# Patient Record
Sex: FEMALE | Race: WHITE | URBAN - METROPOLITAN AREA
[De-identification: names, ages, dates, MRNs, and addresses within clinical notes are randomized per-mention and may not be internally consistent; named-entity substitution may affect disease eponyms.]

---

## 2022-03-02 ENCOUNTER — HOSPITAL ENCOUNTER (OUTPATIENT)
Dept: WOUND CARE | Age: 72
Discharge: HOME | End: 2022-03-02
Payer: COMMERCIAL

## 2022-03-02 VITALS
TEMPERATURE: 96.8 F | BODY MASS INDEX: 39.75 KG/M2 | HEART RATE: 98 BPM | WEIGHT: 216 LBS | HEIGHT: 62 IN | DIASTOLIC BLOOD PRESSURE: 96 MMHG | SYSTOLIC BLOOD PRESSURE: 200 MMHG

## 2022-03-02 DIAGNOSIS — E11.42 DIABETIC POLYNEUROPATHY ASSOCIATED WITH TYPE 2 DIABETES MELLITUS (HCC): ICD-10-CM

## 2022-03-02 DIAGNOSIS — R60.0 LOCALIZED EDEMA: ICD-10-CM

## 2022-03-02 PROCEDURE — 99212 OFFICE O/P EST SF 10 MIN: CPT

## 2022-03-02 RX ORDER — VIT C/B6/B5/MAGNESIUM/HERB 173 50-5-6-5MG
500 CAPSULE ORAL DAILY
COMMUNITY

## 2022-03-02 RX ORDER — ACETAMINOPHEN 500 MG
500 TABLET ORAL EVERY 6 HOURS PRN
COMMUNITY

## 2022-03-02 NOTE — PROGRESS NOTES
[facility]   Progress Note and Procedure Note      Siddharth Estrada  AGE: 66 y.o. GENDER: female  : 1950  TODAY'S DATE:  3/2/2022    Subjective:     Chief Complaint   Patient presents with    Wound Check     right leg 1st visit         HISTORY of PRESENT ILLNESS HPI     Siddharth Estrada is a 66 y.o. female who presents today for wound evaluation. History of Wound: Admits to having a lump on her leg then a wound with cellulitis. She has been treated with antibiotics and was sent to the wound care center. She states that the wound is healed and is been looking better progressively but she was still concerned about what happened with her leg. She does not remember specific injury. She denies current nausea, vomiting, fever, chills, shortness of breath or chest pain. Wound Pain:  none  Severity:  0 / 10   Wound Type:  diabetic, traumatic and Hematoma  Modifying Factors:  edema  Associated Signs/Symptoms:  edema        PAST MEDICAL HISTORY        Diagnosis Date    Diabetes mellitus ([address]     Hyperlipidemia     Hypertension        PAST SURGICAL HISTORY    Past Surgical History:   Procedure Laterality Date    CARPAL TUNNEL RELEASE      HERNIA REPAIR      ROTATOR CUFF REPAIR      right shoulder    TOTAL KNEE ARTHROPLASTY      right and left        FAMILY HISTORY    History reviewed. No pertinent family history.     SOCIAL HISTORY    Social History     Tobacco Use    Smoking status: Former Smoker     Types: Cigarettes    Smokeless tobacco: Never Used    Tobacco comment: over 48 years ago   Vaping Use    Vaping Use: Never used   Substance Use Topics    Alcohol use: Yes     Comment: 2 times a month    Drug use: Never       ALLERGIES    Not on File    MEDICATIONS    Current Outpatient Medications on File Prior to Encounter   Medication Sig Dispense Refill    turmeric (QC TUMERIC COMPLEX) 500 MG CAPS Take 500 mg by mouth daily      Folic Acid-Vit H2-RLT W00 0.5-5-0.2 MG TABS Take by mouth 2 times daily      acetaminophen (TYLENOL) 500 MG tablet Take 500 mg by mouth every 6 hours as needed for Pain Indications: time released       No current facility-administered medications on file prior to encounter. REVIEW OF SYSTEMS    Pertinent items are noted in HPI. Objective:      BP (!) 200/96   Pulse 98   Temp 96.8 °F (36 °C) (Tympanic)   Ht 5' 2\" (1.575 m)   Wt 216 lb (98 kg)   BMI 39.51 kg/m²     PHYSICAL EXAM    Vascular: Vascular status Impaired  palpable pedal pulses, right DP2/4 and PT1/4, left DP2/4 and PT1/4. CFT 2 seconds digits 1 to 5 bilateral.  Hair growthAbsent  both lower extremities and feet. Skin temperature is warm to warm from pretibial area to distal digits bilateral.  Exam is negative for rubor, pallor, cyanosis or signs of acute vascular compromise bilaterally. Exam is negative for edema bilateral lower extremity. Varicosities Present bilateral lower extremity. Neuro: Neurologic status diminished bilateral with epicritic Present , proprioceptive Present, vibratory sensationPresent and protopathicPresent. DTRs Present bilateral Achilles. There were no reproducible neuritic symptoms on exam bilateral feet/ankles. Derm: Mild chronic venous stasis changes. Ecchymosis Absent  bilateral feet/foot. Palpable subcutaneous mass approximately 3 cm x 2 cm with a thickness of 1 cm consistent with hematoma. Musculoskeletal: Pain with palpation of subcutaneous mass. 5/5 muscle strength in/eversion and dorsi/plantarflexion bilateral feet. No gross instability noted.         Assessment:     Problem List Items Addressed This Visit     Localized edema    Diabetic polyneuropathy ([facility] [address]            Plan:   Patient examined and evaluated   Wound currently healed  Discussed etiology of the patient's hematoma, wound and cellulitis and answered all questions the patient satisfaction  Discharge from wound care center  Discussed the nature of the patient's condition was explained in depth.  The patient was informed that their compliance to the treatment plan is paramount to successful healing and prevention of further ulceration and/or infection     Discharge Treatment  Follow-up as needed    Written Patient Discharge Instructions Given            Electronically signed by Reshma Schmid DPM on 3/2/2022 at 2:20 PM

## 2023-11-27 ENCOUNTER — TELEPHONE (OUTPATIENT)
Dept: CARDIAC REHAB | Age: 73
End: 2023-11-27

## 2023-11-27 NOTE — TELEPHONE ENCOUNTER
Called pt. To confirm cardiac rehab evaluation 9am. Told about $10 copay/ visit till Max oop by 2nd. Ins. [employer]. Pt. Will call [employer] And check coverage beyond medicare a/b. \"This is new for me. \" Told about 12 visit minimum. Verbalized understanding. Coming.

## 2023-11-28 ENCOUNTER — HOSPITAL ENCOUNTER (OUTPATIENT)
Dept: CARDIAC REHAB | Age: 73
Setting detail: THERAPIES SERIES
Discharge: HOME | End: 2023-11-28
Payer: MEDICARE

## 2023-11-28 VITALS
DIASTOLIC BLOOD PRESSURE: 80 MMHG | SYSTOLIC BLOOD PRESSURE: 142 MMHG | HEART RATE: 72 BPM | WEIGHT: 204.4 LBS | BODY MASS INDEX: 37.61 KG/M2 | HEIGHT: 62 IN | RESPIRATION RATE: 16 BRPM

## 2023-11-28 PROCEDURE — 93798 PHYS/QHP OP CAR RHAB W/ECG: CPT

## 2023-11-28 RX ORDER — AMOXICILLIN 500 MG/1
500 CAPSULE ORAL SEE ADMIN INSTRUCTIONS
COMMUNITY
Start: 2023-09-08

## 2023-11-28 RX ORDER — CLOPIDOGREL BISULFATE 75 MG/1
75 TABLET ORAL DAILY
COMMUNITY
Start: 2023-06-19

## 2023-11-28 RX ORDER — CARVEDILOL 12.5 MG/1
12.5 TABLET ORAL 2 TIMES DAILY WITH MEALS
COMMUNITY
Start: 2023-09-05

## 2023-11-28 RX ORDER — GLUCOSAMINE SULFATE 500 MG
1500 CAPSULE ORAL SEE ADMIN INSTRUCTIONS
COMMUNITY

## 2023-11-28 RX ORDER — ASPIRIN 81 MG/1
81 TABLET ORAL DAILY
COMMUNITY

## 2023-11-28 RX ORDER — CALCIUM CARBONATE-CHOLECALCIFEROL TAB 250 MG-125 UNIT 250-125 MG-UNIT
1 TAB ORAL DAILY
COMMUNITY

## 2023-11-28 RX ORDER — SPIRONOLACTONE 25 MG/1
12.5 TABLET ORAL DAILY
COMMUNITY

## 2023-11-28 RX ORDER — POLYMYXIN B SULFATE AND TRIMETHOPRIM 1; 10000 MG/ML; [USP'U]/ML
1 SOLUTION OPHTHALMIC 4 TIMES DAILY
COMMUNITY
Start: 2023-10-04

## 2023-11-28 RX ORDER — METFORMIN HYDROCHLORIDE 500 MG/1
500 TABLET, EXTENDED RELEASE ORAL SEE ADMIN INSTRUCTIONS
COMMUNITY
Start: 2023-11-27

## 2023-11-28 RX ORDER — ATORVASTATIN CALCIUM 80 MG/1
80 TABLET, FILM COATED ORAL NIGHTLY
COMMUNITY
Start: 2023-10-16

## 2023-11-28 RX ORDER — FUROSEMIDE 40 MG/1
40 TABLET ORAL DAILY PRN
COMMUNITY
Start: 2023-05-23

## 2023-11-28 RX ORDER — ASCORBIC ACID 500 MG
TABLET ORAL DAILY
COMMUNITY

## 2023-11-28 ASSESSMENT — PATIENT HEALTH QUESTIONNAIRE - PHQ9
7. TROUBLE CONCENTRATING ON THINGS, SUCH AS READING THE NEWSPAPER OR WATCHING TELEVISION: 0
SUM OF ALL RESPONSES TO PHQ9 QUESTIONS 1 & 2: 0
1. LITTLE INTEREST OR PLEASURE IN DOING THINGS: 0
SUM OF ALL RESPONSES TO PHQ QUESTIONS 1-9: 1
6. FEELING BAD ABOUT YOURSELF - OR THAT YOU ARE A FAILURE OR HAVE LET YOURSELF OR YOUR FAMILY DOWN: 0
9. THOUGHTS THAT YOU WOULD BE BETTER OFF DEAD, OR OF HURTING YOURSELF: 0
4. FEELING TIRED OR HAVING LITTLE ENERGY: 1
SUM OF ALL RESPONSES TO PHQ QUESTIONS 1-9: 1
8. MOVING OR SPEAKING SO SLOWLY THAT OTHER PEOPLE COULD HAVE NOTICED. OR THE OPPOSITE, BEING SO FIGETY OR RESTLESS THAT YOU HAVE BEEN MOVING AROUND A LOT MORE THAN USUAL: 0
2. FEELING DOWN, DEPRESSED OR HOPELESS: 0
3. TROUBLE FALLING OR STAYING ASLEEP: 0
5. POOR APPETITE OR OVEREATING: 0
10. IF YOU CHECKED OFF ANY PROBLEMS, HOW DIFFICULT HAVE THESE PROBLEMS MADE IT FOR YOU TO DO YOUR WORK, TAKE CARE OF THINGS AT HOME, OR GET ALONG WITH OTHER PEOPLE: 0
SUM OF ALL RESPONSES TO PHQ QUESTIONS 1-9: 1
SUM OF ALL RESPONSES TO PHQ QUESTIONS 1-9: 1

## 2023-11-28 NOTE — CARDIO/PULMONARY
[facility] Cardiac Rehabilitation Initial Evaluation    Cosme       1950     [id number]    Share medical information:  Yes - Becky Guerrier ();  [phone]    Cardiac History    PTCA Stent  EF:  50-55%    Physical Assessment     General Appearance   Height:  157.5 cm (5' 2\")  Weight:  92.7 kg (204 lb 6.4 oz) (204.4 lb)   BMI:  37.5  Skin color:  Skin is warm, dry and appropriate for ethnicity    Cardiovascular Assessment  BP Sitting:  (!) 142/80 (left arm)  Sittin/80 (right arm)  Standin/80 (right arm)  Heart rate:   72 Monitor   Heart sounds: Exceptions to WDL   S1, S2, No adventitious heart sounds    Respiratory Assessment  Resp rate: 16   SpO2:   Quality/Effort:  Respirations are regular and easy. Lungs are clear bilaterally. No increased work of breathing noted. Sleep Apnea:  No  CPAP  No  Oxygen  No     Sleeping Habits:  No Issues with sleep per pt. Edema:  No      Orthopedic/Exercise Limitations:  Yes - Pt. States that she has spine issues and neuropathy of feet. Pain:   Do you have pain?:  yes - back and feet       Fall Risk Assessment  Outpatient population: Not applicable  History of falling with or without injury: No  Use of ambulatory aid: Yes  Difficulty walking/impaired gait: No  Numbness in feet: Yes  Vision changes: No  Dizziness: No  Shortness of breath: No  Current medications include but not limited to: ACE, ARB, Beta  Blocker, Anticoagulant  Other fall risk : No  Outpatient fall risk intervention strategies: Fall risk education provided    Abuse / Neglect  Physical/behavioral signs of abuse/neglect   No    Do you feel safe at home   Yes    Advanced Directives  Patient has Advanced Directives:  Yes  Patient given Advanced Directive pack:  Declined    Vaccinations  Influenza (annual):  Yes  Pneumonia:  Yes    Pt. Arrived to Cardiopulmonary rehab for the initial interview and evaluation for Cardiac rehab.   Reviewed and discussed insurance

## 2023-11-29 ENCOUNTER — HOSPITAL ENCOUNTER (OUTPATIENT)
Dept: CARDIAC REHAB | Age: 73
Setting detail: THERAPIES SERIES
Discharge: HOME | End: 2023-11-29
Payer: MEDICARE

## 2023-11-29 PROCEDURE — 93798 PHYS/QHP OP CAR RHAB W/ECG: CPT

## 2023-12-01 ENCOUNTER — HOSPITAL ENCOUNTER (OUTPATIENT)
Dept: CARDIAC REHAB | Age: 73
Setting detail: THERAPIES SERIES
Discharge: HOME | End: 2023-12-01
Payer: MEDICARE

## 2023-12-01 PROCEDURE — 93798 PHYS/QHP OP CAR RHAB W/ECG: CPT

## 2023-12-04 ENCOUNTER — APPOINTMENT (OUTPATIENT)
Dept: CARDIAC REHAB | Age: 73
End: 2023-12-04
Payer: MEDICARE

## 2023-12-06 ENCOUNTER — HOSPITAL ENCOUNTER (OUTPATIENT)
Dept: CARDIAC REHAB | Age: 73
Setting detail: THERAPIES SERIES
Discharge: HOME | End: 2023-12-06
Payer: MEDICARE

## 2023-12-06 PROCEDURE — 93798 PHYS/QHP OP CAR RHAB W/ECG: CPT

## 2023-12-08 ENCOUNTER — HOSPITAL ENCOUNTER (OUTPATIENT)
Dept: CARDIAC REHAB | Age: 73
Setting detail: THERAPIES SERIES
Discharge: HOME | End: 2023-12-08
Payer: MEDICARE

## 2023-12-08 LAB
GLUCOSE BLD-MCNC: 81 MG/DL (ref 70–99)
GLUCOSE BLD-MCNC: 88 MG/DL (ref 70–99)
PERFORMED ON: NORMAL
PERFORMED ON: NORMAL

## 2023-12-08 PROCEDURE — 93798 PHYS/QHP OP CAR RHAB W/ECG: CPT

## 2023-12-10 LAB
GLUCOSE BLD-MCNC: 108 MG/DL (ref 70–99)
GLUCOSE BLD-MCNC: 80 MG/DL (ref 70–99)
GLUCOSE BLD-MCNC: 88 MG/DL (ref 70–99)
PERFORMED ON: ABNORMAL
PERFORMED ON: NORMAL
PERFORMED ON: NORMAL

## 2023-12-11 ENCOUNTER — HOSPITAL ENCOUNTER (OUTPATIENT)
Dept: CARDIAC REHAB | Age: 73
Setting detail: THERAPIES SERIES
Discharge: HOME | End: 2023-12-11
Payer: MEDICARE

## 2023-12-11 PROCEDURE — 93798 PHYS/QHP OP CAR RHAB W/ECG: CPT

## 2023-12-13 ENCOUNTER — HOSPITAL ENCOUNTER (OUTPATIENT)
Dept: CARDIAC REHAB | Age: 73
Setting detail: THERAPIES SERIES
Discharge: HOME | End: 2023-12-13
Payer: MEDICARE

## 2023-12-13 PROCEDURE — 93798 PHYS/QHP OP CAR RHAB W/ECG: CPT

## 2023-12-14 ENCOUNTER — HOSPITAL ENCOUNTER (OUTPATIENT)
Dept: CARDIAC REHAB | Age: 73
Setting detail: THERAPIES SERIES
Discharge: HOME | End: 2023-12-14
Payer: MEDICARE

## 2023-12-14 LAB
GLUCOSE BLD-MCNC: 102 MG/DL (ref 70–99)
GLUCOSE BLD-MCNC: 109 MG/DL (ref 70–99)
PERFORMED ON: ABNORMAL
PERFORMED ON: ABNORMAL

## 2023-12-14 PROCEDURE — 93798 PHYS/QHP OP CAR RHAB W/ECG: CPT

## 2023-12-20 ENCOUNTER — APPOINTMENT (OUTPATIENT)
Dept: CARDIAC REHAB | Age: 73
End: 2023-12-20
Payer: MEDICARE

## 2023-12-27 ENCOUNTER — HOSPITAL ENCOUNTER (OUTPATIENT)
Dept: CARDIAC REHAB | Age: 73
Setting detail: THERAPIES SERIES
Discharge: HOME | End: 2023-12-27
Payer: MEDICARE

## 2023-12-27 PROCEDURE — 93798 PHYS/QHP OP CAR RHAB W/ECG: CPT

## 2023-12-29 ENCOUNTER — HOSPITAL ENCOUNTER (OUTPATIENT)
Dept: CARDIAC REHAB | Age: 73
Setting detail: THERAPIES SERIES
Discharge: HOME | End: 2023-12-29
Payer: MEDICARE

## 2023-12-29 PROCEDURE — 93798 PHYS/QHP OP CAR RHAB W/ECG: CPT

## 2024-02-12 ENCOUNTER — HOSPITAL ENCOUNTER (INPATIENT)
Age: 74
LOS: 4 days | Discharge: NURSING, REHAB OR LONG-TERM CARE | End: 2024-02-16
Attending: EMERGENCY MEDICINE | Admitting: INTERNAL MEDICINE
Payer: MEDICARE

## 2024-02-12 ENCOUNTER — APPOINTMENT (OUTPATIENT)
Dept: MRI IMAGING | Age: 74
End: 2024-02-12
Payer: MEDICARE

## 2024-02-12 ENCOUNTER — APPOINTMENT (OUTPATIENT)
Dept: CT IMAGING | Age: 74
End: 2024-02-12
Payer: MEDICARE

## 2024-02-12 ENCOUNTER — APPOINTMENT (OUTPATIENT)
Dept: GENERAL RADIOLOGY | Age: 74
End: 2024-02-12
Payer: MEDICARE

## 2024-02-12 DIAGNOSIS — S82.62XA CLOSED DISPLACED FRACTURE OF LATERAL MALLEOLUS OF LEFT FIBULA, INITIAL ENCOUNTER: ICD-10-CM

## 2024-02-12 DIAGNOSIS — R79.89 ELEVATED TROPONIN: ICD-10-CM

## 2024-02-12 DIAGNOSIS — R53.1 ACUTE LEFT-SIDED WEAKNESS: Primary | ICD-10-CM

## 2024-02-12 PROBLEM — E11.9 DM2 (DIABETES MELLITUS, TYPE 2) (HCC): Status: ACTIVE | Noted: 2024-02-12

## 2024-02-12 PROBLEM — I10 HTN (HYPERTENSION): Status: ACTIVE | Noted: 2024-02-12

## 2024-02-12 PROBLEM — I63.9 ACUTE CVA (CEREBROVASCULAR ACCIDENT) (HCC): Status: ACTIVE | Noted: 2024-02-12

## 2024-02-12 PROBLEM — G81.94 LEFT HEMIPLEGIA (HCC): Status: ACTIVE | Noted: 2024-02-12

## 2024-02-12 PROBLEM — E66.9 OBESITY (BMI 30-39.9): Status: ACTIVE | Noted: 2024-02-12

## 2024-02-12 PROBLEM — I25.10 CAD (CORONARY ARTERY DISEASE): Status: ACTIVE | Noted: 2024-02-12

## 2024-02-12 PROBLEM — R13.10 DYSPHAGIA: Status: ACTIVE | Noted: 2024-02-12

## 2024-02-12 LAB
ANION GAP SERPL CALCULATED.3IONS-SCNC: 16 MMOL/L (ref 3–16)
ANISOCYTOSIS BLD QL SMEAR: ABNORMAL
BASOPHILS # BLD: 0 K/UL (ref 0–0.2)
BASOPHILS NFR BLD: 0 %
BUN SERPL-MCNC: 21 MG/DL (ref 7–20)
CALCIUM SERPL-MCNC: 9.3 MG/DL (ref 8.3–10.6)
CHLORIDE SERPL-SCNC: 99 MMOL/L (ref 99–110)
CO2 SERPL-SCNC: 22 MMOL/L (ref 21–32)
CREAT SERPL-MCNC: 0.8 MG/DL (ref 0.6–1.2)
CRP SERPL-MCNC: 69.1 MG/L (ref 0–5.1)
DEPRECATED RDW RBC AUTO: 13.8 % (ref 12.4–15.4)
EKG ATRIAL RATE: 78 BPM
EKG DIAGNOSIS: NORMAL
EKG P AXIS: 71 DEGREES
EKG P-R INTERVAL: 152 MS
EKG Q-T INTERVAL: 444 MS
EKG QRS DURATION: 150 MS
EKG QTC CALCULATION (BAZETT): 506 MS
EKG R AXIS: -57 DEGREES
EKG T AXIS: 116 DEGREES
EKG VENTRICULAR RATE: 78 BPM
EOSINOPHIL # BLD: 0 K/UL (ref 0–0.6)
EOSINOPHIL NFR BLD: 0 %
ERYTHROCYTE [SEDIMENTATION RATE] IN BLOOD BY WESTERGREN METHOD: 99 MM/HR (ref 0–30)
FLUAV RNA RESP QL NAA+PROBE: NOT DETECTED
FLUBV RNA RESP QL NAA+PROBE: NOT DETECTED
GFR SERPLBLD CREATININE-BSD FMLA CKD-EPI: >60 ML/MIN/{1.73_M2}
GLUCOSE BLD-MCNC: 190 MG/DL (ref 70–99)
GLUCOSE SERPL-MCNC: 186 MG/DL (ref 70–99)
HCT VFR BLD AUTO: 35.3 % (ref 36–48)
HGB BLD-MCNC: 11.6 G/DL (ref 12–16)
LYMPHOCYTES # BLD: 1.6 K/UL (ref 1–5.1)
LYMPHOCYTES NFR BLD: 12 %
MCH RBC QN AUTO: 30.5 PG (ref 26–34)
MCHC RBC AUTO-ENTMCNC: 33 G/DL (ref 31–36)
MCV RBC AUTO: 92.3 FL (ref 80–100)
MONOCYTES # BLD: 0.7 K/UL (ref 0–1.3)
MONOCYTES NFR BLD: 6 %
NEUTROPHILS # BLD: 9.8 K/UL (ref 1.7–7.7)
NEUTROPHILS NFR BLD: 81 %
NT-PROBNP SERPL-MCNC: 811 PG/ML (ref 0–449)
PERFORMED ON: ABNORMAL
PLATELET # BLD AUTO: 240 K/UL (ref 135–450)
PLATELET BLD QL SMEAR: ADEQUATE
PMV BLD AUTO: 7.1 FL (ref 5–10.5)
POTASSIUM SERPL-SCNC: 4.3 MMOL/L (ref 3.5–5.1)
PROCALCITONIN SERPL IA-MCNC: 0.14 NG/ML (ref 0–0.15)
RBC # BLD AUTO: 3.82 M/UL (ref 4–5.2)
SARS-COV-2 RNA RESP QL NAA+PROBE: NOT DETECTED
SLIDE REVIEW: ABNORMAL
SODIUM SERPL-SCNC: 137 MMOL/L (ref 136–145)
TROPONIN, HIGH SENSITIVITY: 33 NG/L (ref 0–14)
VARIANT LYMPHS NFR BLD MANUAL: 1 % (ref 0–6)
WBC # BLD AUTO: 12.1 K/UL (ref 4–11)

## 2024-02-12 PROCEDURE — 70551 MRI BRAIN STEM W/O DYE: CPT

## 2024-02-12 PROCEDURE — 6370000000 HC RX 637 (ALT 250 FOR IP): Performed by: INTERNAL MEDICINE

## 2024-02-12 PROCEDURE — 6360000002 HC RX W HCPCS: Performed by: INTERNAL MEDICINE

## 2024-02-12 PROCEDURE — 6360000004 HC RX CONTRAST MEDICATION: Performed by: INTERNAL MEDICINE

## 2024-02-12 PROCEDURE — 2060000000 HC ICU INTERMEDIATE R&B

## 2024-02-12 PROCEDURE — 73610 X-RAY EXAM OF ANKLE: CPT

## 2024-02-12 PROCEDURE — 84145 PROCALCITONIN (PCT): CPT

## 2024-02-12 PROCEDURE — 70450 CT HEAD/BRAIN W/O DYE: CPT

## 2024-02-12 PROCEDURE — 2580000003 HC RX 258: Performed by: INTERNAL MEDICINE

## 2024-02-12 PROCEDURE — 93005 ELECTROCARDIOGRAM TRACING: CPT | Performed by: INTERNAL MEDICINE

## 2024-02-12 PROCEDURE — 87636 SARSCOV2 & INF A&B AMP PRB: CPT

## 2024-02-12 PROCEDURE — 96365 THER/PROPH/DIAG IV INF INIT: CPT

## 2024-02-12 PROCEDURE — 93010 ELECTROCARDIOGRAM REPORT: CPT | Performed by: INTERNAL MEDICINE

## 2024-02-12 PROCEDURE — 70498 CT ANGIOGRAPHY NECK: CPT

## 2024-02-12 PROCEDURE — 71045 X-RAY EXAM CHEST 1 VIEW: CPT

## 2024-02-12 PROCEDURE — 86140 C-REACTIVE PROTEIN: CPT

## 2024-02-12 PROCEDURE — 36415 COLL VENOUS BLD VENIPUNCTURE: CPT

## 2024-02-12 PROCEDURE — 85652 RBC SED RATE AUTOMATED: CPT

## 2024-02-12 PROCEDURE — 99285 EMERGENCY DEPT VISIT HI MDM: CPT

## 2024-02-12 PROCEDURE — 84484 ASSAY OF TROPONIN QUANT: CPT

## 2024-02-12 PROCEDURE — 85025 COMPLETE CBC W/AUTO DIFF WBC: CPT

## 2024-02-12 PROCEDURE — 80048 BASIC METABOLIC PNL TOTAL CA: CPT

## 2024-02-12 PROCEDURE — 96375 TX/PRO/DX INJ NEW DRUG ADDON: CPT

## 2024-02-12 PROCEDURE — 83880 ASSAY OF NATRIURETIC PEPTIDE: CPT

## 2024-02-12 RX ORDER — FENTANYL CITRATE 50 UG/ML
25 INJECTION, SOLUTION INTRAMUSCULAR; INTRAVENOUS ONCE
Status: COMPLETED | OUTPATIENT
Start: 2024-02-12 | End: 2024-02-12

## 2024-02-12 RX ORDER — CLOPIDOGREL BISULFATE 75 MG/1
75 TABLET ORAL DAILY
Status: DISCONTINUED | OUTPATIENT
Start: 2024-02-13 | End: 2024-02-16 | Stop reason: HOSPADM

## 2024-02-12 RX ORDER — POTASSIUM CHLORIDE 7.45 MG/ML
10 INJECTION INTRAVENOUS PRN
Status: DISCONTINUED | OUTPATIENT
Start: 2024-02-12 | End: 2024-02-16 | Stop reason: HOSPADM

## 2024-02-12 RX ORDER — ACETAMINOPHEN 650 MG/1
650 SUPPOSITORY RECTAL EVERY 6 HOURS PRN
Status: DISCONTINUED | OUTPATIENT
Start: 2024-02-12 | End: 2024-02-16 | Stop reason: HOSPADM

## 2024-02-12 RX ORDER — VITAMIN B COMPLEX
1000 TABLET ORAL DAILY
Status: DISCONTINUED | OUTPATIENT
Start: 2024-02-13 | End: 2024-02-16 | Stop reason: HOSPADM

## 2024-02-12 RX ORDER — ONDANSETRON 2 MG/ML
4 INJECTION INTRAMUSCULAR; INTRAVENOUS EVERY 6 HOURS PRN
Status: DISCONTINUED | OUTPATIENT
Start: 2024-02-12 | End: 2024-02-16 | Stop reason: HOSPADM

## 2024-02-12 RX ORDER — POTASSIUM CHLORIDE 20 MEQ/1
40 TABLET, EXTENDED RELEASE ORAL PRN
Status: DISCONTINUED | OUTPATIENT
Start: 2024-02-12 | End: 2024-02-16 | Stop reason: HOSPADM

## 2024-02-12 RX ORDER — POLYETHYLENE GLYCOL 3350 17 G/17G
17 POWDER, FOR SOLUTION ORAL DAILY PRN
Status: DISCONTINUED | OUTPATIENT
Start: 2024-02-12 | End: 2024-02-16 | Stop reason: HOSPADM

## 2024-02-12 RX ORDER — SODIUM CHLORIDE 0.9 % (FLUSH) 0.9 %
5-40 SYRINGE (ML) INJECTION EVERY 12 HOURS SCHEDULED
Status: DISCONTINUED | OUTPATIENT
Start: 2024-02-12 | End: 2024-02-16 | Stop reason: HOSPADM

## 2024-02-12 RX ORDER — ONDANSETRON 4 MG/1
4 TABLET, ORALLY DISINTEGRATING ORAL EVERY 8 HOURS PRN
Status: DISCONTINUED | OUTPATIENT
Start: 2024-02-12 | End: 2024-02-12

## 2024-02-12 RX ORDER — ONDANSETRON 4 MG/1
4 TABLET, ORALLY DISINTEGRATING ORAL EVERY 8 HOURS PRN
Status: DISCONTINUED | OUTPATIENT
Start: 2024-02-12 | End: 2024-02-16 | Stop reason: HOSPADM

## 2024-02-12 RX ORDER — SODIUM CHLORIDE 0.9 % (FLUSH) 0.9 %
5-40 SYRINGE (ML) INJECTION PRN
Status: DISCONTINUED | OUTPATIENT
Start: 2024-02-12 | End: 2024-02-16 | Stop reason: HOSPADM

## 2024-02-12 RX ORDER — MAGNESIUM SULFATE IN WATER 40 MG/ML
2000 INJECTION, SOLUTION INTRAVENOUS PRN
Status: DISCONTINUED | OUTPATIENT
Start: 2024-02-12 | End: 2024-02-16 | Stop reason: HOSPADM

## 2024-02-12 RX ORDER — SODIUM CHLORIDE 9 MG/ML
INJECTION, SOLUTION INTRAVENOUS CONTINUOUS
Status: ACTIVE | OUTPATIENT
Start: 2024-02-12 | End: 2024-02-14

## 2024-02-12 RX ORDER — ENOXAPARIN SODIUM 100 MG/ML
40 INJECTION SUBCUTANEOUS DAILY
Status: DISCONTINUED | OUTPATIENT
Start: 2024-02-12 | End: 2024-02-16 | Stop reason: HOSPADM

## 2024-02-12 RX ORDER — CETIRIZINE HYDROCHLORIDE 10 MG/1
10 TABLET ORAL DAILY
Status: DISCONTINUED | OUTPATIENT
Start: 2024-02-13 | End: 2024-02-16 | Stop reason: HOSPADM

## 2024-02-12 RX ORDER — ATORVASTATIN CALCIUM 80 MG/1
80 TABLET, FILM COATED ORAL NIGHTLY
Status: DISCONTINUED | OUTPATIENT
Start: 2024-02-12 | End: 2024-02-16 | Stop reason: HOSPADM

## 2024-02-12 RX ORDER — ASPIRIN 81 MG/1
81 TABLET ORAL DAILY
Status: DISCONTINUED | OUTPATIENT
Start: 2024-02-13 | End: 2024-02-16 | Stop reason: HOSPADM

## 2024-02-12 RX ORDER — CARVEDILOL 6.25 MG/1
12.5 TABLET ORAL 2 TIMES DAILY WITH MEALS
Status: DISCONTINUED | OUTPATIENT
Start: 2024-02-12 | End: 2024-02-16 | Stop reason: HOSPADM

## 2024-02-12 RX ORDER — ACETAMINOPHEN 325 MG/1
650 TABLET ORAL EVERY 6 HOURS PRN
Status: DISCONTINUED | OUTPATIENT
Start: 2024-02-12 | End: 2024-02-16 | Stop reason: HOSPADM

## 2024-02-12 RX ORDER — SODIUM CHLORIDE 9 MG/ML
INJECTION, SOLUTION INTRAVENOUS PRN
Status: DISCONTINUED | OUTPATIENT
Start: 2024-02-12 | End: 2024-02-16 | Stop reason: HOSPADM

## 2024-02-12 RX ORDER — HYDRALAZINE HYDROCHLORIDE 20 MG/ML
10 INJECTION INTRAMUSCULAR; INTRAVENOUS EVERY 4 HOURS PRN
Status: DISCONTINUED | OUTPATIENT
Start: 2024-02-12 | End: 2024-02-16 | Stop reason: HOSPADM

## 2024-02-12 RX ORDER — ONDANSETRON 2 MG/ML
4 INJECTION INTRAMUSCULAR; INTRAVENOUS EVERY 6 HOURS PRN
Status: DISCONTINUED | OUTPATIENT
Start: 2024-02-12 | End: 2024-02-12

## 2024-02-12 RX ADMIN — SACUBITRIL AND VALSARTAN 1 TABLET: 24; 26 TABLET, FILM COATED ORAL at 22:20

## 2024-02-12 RX ADMIN — FENTANYL CITRATE 25 MCG: 50 INJECTION INTRAMUSCULAR; INTRAVENOUS at 14:34

## 2024-02-12 RX ADMIN — Medication 10 ML: at 22:27

## 2024-02-12 RX ADMIN — CARVEDILOL 12.5 MG: 6.25 TABLET, FILM COATED ORAL at 22:30

## 2024-02-12 RX ADMIN — PIPERACILLIN AND TAZOBACTAM 4500 MG: 4; .5 INJECTION, POWDER, FOR SOLUTION INTRAVENOUS at 15:37

## 2024-02-12 RX ADMIN — IOPAMIDOL 75 ML: 755 INJECTION, SOLUTION INTRAVENOUS at 12:36

## 2024-02-12 RX ADMIN — ENOXAPARIN SODIUM 40 MG: 100 INJECTION SUBCUTANEOUS at 22:30

## 2024-02-12 RX ADMIN — ATORVASTATIN CALCIUM 80 MG: 80 TABLET, FILM COATED ORAL at 22:20

## 2024-02-12 RX ADMIN — SODIUM CHLORIDE: 9 INJECTION, SOLUTION INTRAVENOUS at 22:27

## 2024-02-12 ASSESSMENT — PAIN SCALES - GENERAL
PAINLEVEL_OUTOF10: 0
PAINLEVEL_OUTOF10: 7
PAINLEVEL_OUTOF10: 9

## 2024-02-12 ASSESSMENT — PAIN - FUNCTIONAL ASSESSMENT: PAIN_FUNCTIONAL_ASSESSMENT: 0-10

## 2024-02-12 ASSESSMENT — PAIN DESCRIPTION - LOCATION: LOCATION: HEAD

## 2024-02-12 ASSESSMENT — PAIN DESCRIPTION - DESCRIPTORS: DESCRIPTORS: ACHING

## 2024-02-12 NOTE — ED PROVIDER NOTES
EMERGENCY MEDICINE PROVIDER NOTE    Patient Identification  Pt Name: Rissa Becerra  MRN: [id number]  Birthdate 1950  Date of evaluation: 2/12/2024  Provider: WILLIAM HAIDER DO  PCP: Stephanie Oneill MD    Chief Complaint  Cerebrovascular Accident (Pt brought in per [facility] EMS from home, pt was taken by family to [facility] on Friday for slurred speech, dx with a CVA, then seen by a telehealth neurologist and discharged on Saturday.  Per family pts symptoms have progressed daily since discharge.  Pt has left sided facial droop, left arm flaccid.  Stroke alert called in the field)      HPI  (History provided by EMS)  This is a 73 y.o. female who was brought in by EMS transportation for left leg and left arm weakness along with left-sided facial droop.  Patient was seen on Friday at [facility] for a stroke.  Family arrived and informed me that she had intermittent left-sided facial droop along with slurred speech that started at 3 PM Friday.  She was last seen normal at 10 AM Friday.  Patient was admitted overnight at [facility] and was found to have multifocal strokes on the CT of the MRI.  When she was discharged on Saturday the weakness in her left arm and left leg along with a left-sided face worsened.  Family called today because she fell twice due to weakness.  Patient does have left ankle weakness as well.  Stroke alert was called prior to arrival and admit the patient in the hallway.  Patient went right to CT for a scan    I have reviewed the following nursing documentation:  Allergies: Amoxicillin, Erythromycin, and Gabapentin    Past medical history:   Past Medical History:   Diagnosis Date    Arthropathy     Asthma     Bell's palsy     on right side    Bronchitis     Diabetes mellitus (HCC)     Diverticulitis     Hyperlipidemia     Hypertension     Migraine     Polyneuropathy in diabetes (HCC)     Vertigo      Past surgical history:   Past Surgical History:   Procedure Laterality Date    CARPAL

## 2024-02-12 NOTE — H&P
HOSPITALISTS HISTORY AND PHYSICAL    2/12/2024 5:18 PM    Patient Information:  ZACK SHELLEY is a 73 y.o. female [id number]  PCP:  Stephanie Oneill MD (Tel: [phone] )    Chief complaint:    Chief Complaint   Patient presents with    Cerebrovascular Accident     Pt brought in per [facility] EMS from home, pt was taken by family to [facility] on Friday for slurred speech, dx with a CVA, then seen by a telehealth neurologist and discharged on Saturday.  Per family pts symptoms have progressed daily since discharge.  Pt has left sided facial droop, left arm flaccid.  Stroke alert called in the field        History of Present Illness:  Zack Shelley is a 73 y.o. female with history of DM 2, CAD, chronic combined CHF, HTN, recent diagnosis of stroke at [facility] a few days came to ER with complaints of worsening L sided weakness and falls.  Patient has slurred speech and L facial droop at [facility].  She was discharged to home with home care.  Family is very supportive.  Noted she had progressing weakness and falls.  Today, noted to have flaccid LUE/LLE.  Brought to ER for evaluation.  No CP, SOB, HA or dizziness.  Patient is awake and answering questions.  , daughters and son are at bedside with her today.  Otherwise complete ROS is negative unless listed above.      REVIEW OF SYSTEMS:   Pertinent positives as noted in HPI.  All other systems were reviewed and are negative.      Past Medical History:   has a past medical history of Arthropathy, Asthma, Bell's palsy, Bronchitis, Diabetes mellitus (HCC), Diverticulitis, Hyperlipidemia, Hypertension, Migraine, Polyneuropathy in diabetes (HCC), and Vertigo.     Past Surgical History:   has a past surgical history that includes hernia repair; Carpal tunnel release; Total knee arthroplasty; Rotator cuff repair; and Percutaneous Transluminal  12:30 PM     02/12/2024 12:30 PM    MPV 7.1 02/12/2024 12:30 PM     BMP:    Lab Results   Component Value Date/Time     02/12/2024 12:30 PM    K 4.3 02/12/2024 12:30 PM    CL 99 02/12/2024 12:30 PM    CO2 22 02/12/2024 12:30 PM    BUN 21 02/12/2024 12:30 PM    CREATININE 0.8 02/12/2024 12:30 PM    CALCIUM 9.3 02/12/2024 12:30 PM    LABGLOM >60 02/12/2024 12:30 PM    GLUCOSE 186 02/12/2024 12:30 PM     MRI BRAIN WO CONTRAST   Final Result   1. Scattered areas of acute infarct within the right MCA territory.  No   significant mass effect or midline shift.   2. Otherwise, no acute intracranial abnormality.   3. Mild to moderate global parenchymal volume loss with mild chronic   microvascular ischemic changes.   These results were sent to the CORE Team on 2/12/2024 at 3:45 pm to be   communicated to the referring/covering health care provider/office.         XR ANKLE LEFT (MIN 3 VIEWS)   Final Result   Minimally displaced oblique fracture is seen through the lateral malleolus.   There is surrounding soft tissue swelling.      Tibiotalar osteoarthritis         XR CHEST PORTABLE   Final Result   Asymmetric ground-glass opacities in the right lung may represent edema or   atelectasis.         CTA HEAD NECK W CONTRAST   Final Result   No significant abnormality of the neck vessels.  Approximately 30% stenosis   of the left internal carotid artery in the region of the bulb.      Occlusion of the left posterior cerebral artery in its P1 segment.  There is   tapering of the distal M2 and M3 segments in the right middle cerebral artery   territory with decreased visualization of vessels in the right parietal   region.  No other significant abnormality of the intracranial circulation.         CT HEAD WO CONTRAST   Final Result   1.  No acute intracranial bleed.   2. Faint, mildly confluent hypoattenuating areas at the right frontal white   matter could reflect sequela of subacute stroke.   3. Senescent changes.   4.

## 2024-02-12 NOTE — ACP (ADVANCE CARE PLANNING)
Advanced Care Planning Note.    Purpose of Encounter: Advanced care planning in light of acute CVA  Parties In Attendance: Patient  Decisional Capacity: Yes  Subjective: Patient with L sided weakness  Objective: Cr 0.8  Goals of Care Determination: Patient wants limited support (No CPR, short vent, ok for surgery and HD, no trach, ok for PEG)  Plan:  MRI Brain, Echo, PT/OT/SLP, Neuro and PMR consults  Code Status: DNR CCA   Time spent on Advanced care Plannin minutes  Advanced Care Planning Documents: Completed advanced directives on chart,  is the POA.    Itz Miller MD  2024 5:18 PM

## 2024-02-12 NOTE — ED NOTES
ED TO INPATIENT SBAR HANDOFF    Patient Name: Rissa Becerra   :  1950  73 y.o.   MRN:  [id number]  Preferred Name  [place]   ED Room #:  ED-0021/21  Family/Caregiver Present yes   Restraints no   Sitter no   Sepsis Risk Score Sepsis Risk Score: 2.91    Situation  Code Status: No Order No additional code details.    Allergies: Amoxicillin, Erythromycin, and Gabapentin  Weight: Patient Vitals for the past 96 hrs (Last 3 readings):   Weight   24 1250 95.8 kg (211 lb 4.8 oz)     Arrived from: home  Chief Complaint:   Chief Complaint   Patient presents with    Cerebrovascular Accident     Pt brought in per [facility] EMS from home, pt was taken by family to [facility] on Friday for slurred speech, dx with a CVA, then seen by a telehealth neurologist and discharged on Saturday.  Per family pts symptoms have progressed daily since discharge.  Pt has left sided facial droop, left arm flaccid.  Stroke alert called in the field     Hospital Problem/Diagnosis:  Principal Problem:    Acute CVA (cerebrovascular accident) (HCC)  Active Problems:    Diabetic polyneuropathy (HCC)    Left hemiplegia (HCC)    Dysphagia    CAD (coronary artery disease)    DM2 (diabetes mellitus, type 2) (HCC)    HTN (hypertension)    Obesity (BMI 30-39.9)  Resolved Problems:    * No resolved hospital problems. *    Imaging:   MRI BRAIN WO CONTRAST   Final Result   1. Scattered areas of acute infarct within the right MCA territory.  No   significant mass effect or midline shift.   2. Otherwise, no acute intracranial abnormality.   3. Mild to moderate global parenchymal volume loss with mild chronic   microvascular ischemic changes.   These results were sent to the CORE Team on 2024 at 3:45 pm to be   communicated to the referring/covering health care provider/office.         XR ANKLE LEFT (MIN 3 VIEWS)   Final Result   Minimally displaced oblique fracture is seen through the lateral malleolus.   There is surrounding soft tissue  swelling.      Tibiotalar osteoarthritis         XR CHEST PORTABLE   Final Result   Asymmetric ground-glass opacities in the right lung may represent edema or   atelectasis.         CTA HEAD NECK W CONTRAST   Final Result   No significant abnormality of the neck vessels.  Approximately 30% stenosis   of the left internal carotid artery in the region of the bulb.      Occlusion of the left posterior cerebral artery in its P1 segment.  There is   tapering of the distal M2 and M3 segments in the right middle cerebral artery   territory with decreased visualization of vessels in the right parietal   region.  No other significant abnormality of the intracranial circulation.         CT HEAD WO CONTRAST   Final Result   1.  No acute intracranial bleed.   2. Faint, mildly confluent hypoattenuating areas at the right frontal white   matter could reflect sequela of subacute stroke.   3. Senescent changes.   4. White matter disease described is typical of microvascular ischemic   disease or as sequela of dysmyelinating/demyelinating processes.   Per stroke alert protocol, the results were called by Dr. James Toro to   WILLIAM HAIDER on 2/12/2024 at 12:51.           Abnormal labs:   Abnormal Labs Reviewed   CBC WITH AUTO DIFFERENTIAL - Abnormal; Notable for the following components:       Result Value    WBC 12.1 (*)     RBC 3.82 (*)     Hemoglobin 11.6 (*)     Hematocrit 35.3 (*)     Neutrophils Absolute 9.8 (*)     Anisocytosis Occasional (*)     All other components within normal limits   BASIC METABOLIC PANEL - Abnormal; Notable for the following components:    Glucose 186 (*)     BUN 21 (*)     All other components within normal limits   TROPONIN - Abnormal; Notable for the following components:    Troponin, High Sensitivity 33 (*)     All other components within normal limits   SEDIMENTATION RATE - Abnormal; Notable for the following components:    Sed Rate 99 (*)     All other components within normal limits

## 2024-02-12 NOTE — PROGRESS NOTES
Pharmacy Home Medication Reconciliation Note    A medication reconciliation has been completed for Rissa Becerra 1950    Pharmacy: [address] [place], [place]    Information provided by: Family    The patient's home medication list is as follows:  No current facility-administered medications on file prior to encounter.     Current Outpatient Medications on File Prior to Encounter   Medication Sig Dispense Refill    amoxicillin (AMOXIL) 500 MG capsule Take 1 capsule by mouth See Admin Instructions (Patient not taking: Reported on 2/12/2024)      atorvastatin (LIPITOR) 80 MG tablet Take 1 tablet by mouth nightly      carvedilol (COREG) 12.5 MG tablet Take 1 tablet by mouth 2 times daily (with meals)      Cetirizine HCl 10 MG CAPS Take 10 mg by mouth daily      clopidogrel (PLAVIX) 75 MG tablet Take 1 tablet by mouth daily      Dulaglutide 0.75 MG/0.5ML SOPN Inject 0.75 mg into the skin once a week      furosemide (LASIX) 40 MG tablet Take 1 tablet by mouth daily as needed      Glucosamine 500 MG CAPS Take 2,000 mg by mouth Daily 2 tablets in am      insulin glargine (LANTUS;BASAGLAR) 100 UNIT/ML injection pen Inject 36 Units into the skin every morning      metFORMIN (GLUCOPHAGE-XR) 500 MG extended release tablet Take 1 tablet by mouth See Admin Instructions Take 4 tablets in AM      trimethoprim-polymyxin b (POLYTRIM) 95110-5.1 UNIT/ML-% ophthalmic solution Apply 1 drop to eye 4 times daily (Patient not taking: Reported on 2/12/2024)      sacubitril-valsartan (ENTRESTO) 24-26 MG per tablet Take 1 tablet by mouth 2 times daily      Calcium Carb-Cholecalciferol (CALCIUM-VITAMIN D3) 250-3.125 MG-MCG TABS Take 1 tablet by mouth daily (Patient not taking: Reported on 2/12/2024)      aspirin 81 MG EC tablet Take 1 tablet by mouth daily      spironolactone (ALDACTONE) 25 MG tablet Take 0.5 tablets by mouth daily (Patient not taking: Reported on 2/12/2024)      vitamin D 25 MCG (1000 UT) CAPS Take 1  capsule by mouth daily      vitamin C (ASCORBIC ACID) 500 MG tablet Take by mouth daily (Patient not taking: Reported on 2/12/2024)      turmeric (QC TUMERIC COMPLEX) 500 MG CAPS Take 500 mg by mouth daily (Patient not taking: Reported on 2/12/2024)      Folic Acid-Vit B6-Vit B12 0.5-5-0.2 MG TABS Take 1 tablet by mouth daily      acetaminophen (TYLENOL) 500 MG tablet Take 1 tablet by mouth every 6 hours as needed for Pain Indications: time released         Patient is no longer taking Amoxil 500mg, Calcium-Vitamin D3, Aldactone 25mg, Polytrim Ophthalmic solution, Turmeric 500mg, or Vitamin C 500mg    Timing of last doses updated.    Thank you,  Stephan Serrano, YARIELhT

## 2024-02-13 ENCOUNTER — APPOINTMENT (OUTPATIENT)
Dept: GENERAL RADIOLOGY | Age: 74
End: 2024-02-13
Payer: MEDICARE

## 2024-02-13 ENCOUNTER — APPOINTMENT (OUTPATIENT)
Dept: CT IMAGING | Age: 74
End: 2024-02-13
Payer: MEDICARE

## 2024-02-13 PROBLEM — S82.62XA CLOSED DISPLACED FRACTURE OF LATERAL MALLEOLUS OF LEFT FIBULA: Status: ACTIVE | Noted: 2024-02-13

## 2024-02-13 PROBLEM — R53.1 ACUTE LEFT-SIDED WEAKNESS: Status: ACTIVE | Noted: 2024-02-13

## 2024-02-13 PROBLEM — E78.5 DYSLIPIDEMIA: Status: ACTIVE | Noted: 2024-02-13

## 2024-02-13 PROBLEM — E13.8 DM (DIABETES MELLITUS), SECONDARY, WITH COMPLICATIONS (HCC): Status: ACTIVE | Noted: 2024-02-13

## 2024-02-13 PROBLEM — I63.231 ARTERIAL ISCHEMIC STROKE, ICA, RIGHT, ACUTE (HCC): Status: ACTIVE | Noted: 2024-02-13

## 2024-02-13 LAB
ANION GAP SERPL CALCULATED.3IONS-SCNC: 12 MMOL/L (ref 3–16)
BASOPHILS # BLD: 0 K/UL (ref 0–0.2)
BASOPHILS NFR BLD: 0.3 %
BUN SERPL-MCNC: 26 MG/DL (ref 7–20)
CALCIUM SERPL-MCNC: 8.8 MG/DL (ref 8.3–10.6)
CHLORIDE SERPL-SCNC: 103 MMOL/L (ref 99–110)
CHOLEST SERPL-MCNC: 120 MG/DL (ref 0–199)
CO2 SERPL-SCNC: 23 MMOL/L (ref 21–32)
CREAT SERPL-MCNC: 0.8 MG/DL (ref 0.6–1.2)
DEPRECATED RDW RBC AUTO: 13.6 % (ref 12.4–15.4)
EOSINOPHIL # BLD: 0.1 K/UL (ref 0–0.6)
EOSINOPHIL NFR BLD: 1 %
EST. AVERAGE GLUCOSE BLD GHB EST-MCNC: 128.4 MG/DL
GFR SERPLBLD CREATININE-BSD FMLA CKD-EPI: >60 ML/MIN/{1.73_M2}
GLUCOSE BLD-MCNC: 144 MG/DL (ref 70–99)
GLUCOSE BLD-MCNC: 150 MG/DL (ref 70–99)
GLUCOSE BLD-MCNC: 161 MG/DL (ref 70–99)
GLUCOSE BLD-MCNC: 199 MG/DL (ref 70–99)
GLUCOSE SERPL-MCNC: 150 MG/DL (ref 70–99)
HBA1C MFR BLD: 6.1 %
HCT VFR BLD AUTO: 32 % (ref 36–48)
HDLC SERPL-MCNC: 36 MG/DL (ref 40–60)
HGB BLD-MCNC: 10.8 G/DL (ref 12–16)
LDLC SERPL CALC-MCNC: 52 MG/DL
LYMPHOCYTES # BLD: 1.8 K/UL (ref 1–5.1)
LYMPHOCYTES NFR BLD: 18.6 %
MCH RBC QN AUTO: 30.9 PG (ref 26–34)
MCHC RBC AUTO-ENTMCNC: 33.8 G/DL (ref 31–36)
MCV RBC AUTO: 91.3 FL (ref 80–100)
MONOCYTES # BLD: 1.1 K/UL (ref 0–1.3)
MONOCYTES NFR BLD: 11.5 %
NEUTROPHILS # BLD: 6.6 K/UL (ref 1.7–7.7)
NEUTROPHILS NFR BLD: 68.6 %
PERFORMED ON: ABNORMAL
PLATELET # BLD AUTO: 206 K/UL (ref 135–450)
PMV BLD AUTO: 7.2 FL (ref 5–10.5)
POTASSIUM SERPL-SCNC: 4.1 MMOL/L (ref 3.5–5.1)
RBC # BLD AUTO: 3.51 M/UL (ref 4–5.2)
SODIUM SERPL-SCNC: 138 MMOL/L (ref 136–145)
TRIGL SERPL-MCNC: 160 MG/DL (ref 0–150)
VLDLC SERPL CALC-MCNC: 32 MG/DL
WBC # BLD AUTO: 9.7 K/UL (ref 4–11)

## 2024-02-13 PROCEDURE — 74018 RADEX ABDOMEN 1 VIEW: CPT

## 2024-02-13 PROCEDURE — APPNB30 APP NON BILLABLE TIME 0-30 MINS

## 2024-02-13 PROCEDURE — 2060000000 HC ICU INTERMEDIATE R&B

## 2024-02-13 PROCEDURE — 6370000000 HC RX 637 (ALT 250 FOR IP): Performed by: NURSE PRACTITIONER

## 2024-02-13 PROCEDURE — 92526 ORAL FUNCTION THERAPY: CPT

## 2024-02-13 PROCEDURE — 73080 X-RAY EXAM OF ELBOW: CPT

## 2024-02-13 PROCEDURE — 97530 THERAPEUTIC ACTIVITIES: CPT

## 2024-02-13 PROCEDURE — 74230 X-RAY XM SWLNG FUNCJ C+: CPT

## 2024-02-13 PROCEDURE — 6360000002 HC RX W HCPCS: Performed by: INTERNAL MEDICINE

## 2024-02-13 PROCEDURE — 85025 COMPLETE CBC W/AUTO DIFF WBC: CPT

## 2024-02-13 PROCEDURE — 97167 OT EVAL HIGH COMPLEX 60 MIN: CPT

## 2024-02-13 PROCEDURE — 73030 X-RAY EXAM OF SHOULDER: CPT

## 2024-02-13 PROCEDURE — 80048 BASIC METABOLIC PNL TOTAL CA: CPT

## 2024-02-13 PROCEDURE — 36415 COLL VENOUS BLD VENIPUNCTURE: CPT

## 2024-02-13 PROCEDURE — 92611 MOTION FLUOROSCOPY/SWALLOW: CPT

## 2024-02-13 PROCEDURE — 2580000003 HC RX 258: Performed by: INTERNAL MEDICINE

## 2024-02-13 PROCEDURE — 73600 X-RAY EXAM OF ANKLE: CPT

## 2024-02-13 PROCEDURE — 6370000000 HC RX 637 (ALT 250 FOR IP): Performed by: INTERNAL MEDICINE

## 2024-02-13 PROCEDURE — 73200 CT UPPER EXTREMITY W/O DYE: CPT

## 2024-02-13 PROCEDURE — 92610 EVALUATE SWALLOWING FUNCTION: CPT

## 2024-02-13 PROCEDURE — 80061 LIPID PANEL: CPT

## 2024-02-13 PROCEDURE — 83036 HEMOGLOBIN GLYCOSYLATED A1C: CPT

## 2024-02-13 PROCEDURE — 97163 PT EVAL HIGH COMPLEX 45 MIN: CPT

## 2024-02-13 PROCEDURE — APPSS60 APP SPLIT SHARED TIME 46-60 MINUTES

## 2024-02-13 PROCEDURE — 92523 SPEECH SOUND LANG COMPREHEN: CPT

## 2024-02-13 RX ORDER — INSULIN LISPRO 100 [IU]/ML
0-4 INJECTION, SOLUTION INTRAVENOUS; SUBCUTANEOUS NIGHTLY
Status: DISCONTINUED | OUTPATIENT
Start: 2024-02-13 | End: 2024-02-14

## 2024-02-13 RX ORDER — DEXTROSE MONOHYDRATE 100 MG/ML
INJECTION, SOLUTION INTRAVENOUS CONTINUOUS PRN
Status: DISCONTINUED | OUTPATIENT
Start: 2024-02-13 | End: 2024-02-16 | Stop reason: HOSPADM

## 2024-02-13 RX ORDER — GLUCAGON 1 MG/ML
1 KIT INJECTION PRN
Status: DISCONTINUED | OUTPATIENT
Start: 2024-02-13 | End: 2024-02-16 | Stop reason: HOSPADM

## 2024-02-13 RX ORDER — ACETAMINOPHEN 325 MG/1
650 TABLET ORAL 3 TIMES DAILY
Status: DISCONTINUED | OUTPATIENT
Start: 2024-02-13 | End: 2024-02-16 | Stop reason: HOSPADM

## 2024-02-13 RX ORDER — INSULIN LISPRO 100 [IU]/ML
0-8 INJECTION, SOLUTION INTRAVENOUS; SUBCUTANEOUS
Status: DISCONTINUED | OUTPATIENT
Start: 2024-02-13 | End: 2024-02-14

## 2024-02-13 RX ORDER — INSULIN GLARGINE 100 [IU]/ML
10 INJECTION, SOLUTION SUBCUTANEOUS
Status: DISCONTINUED | OUTPATIENT
Start: 2024-02-14 | End: 2024-02-14

## 2024-02-13 RX ADMIN — CETIRIZINE HYDROCHLORIDE 10 MG: 10 TABLET, FILM COATED ORAL at 10:19

## 2024-02-13 RX ADMIN — CARVEDILOL 12.5 MG: 6.25 TABLET, FILM COATED ORAL at 17:00

## 2024-02-13 RX ADMIN — ACETAMINOPHEN 650 MG: 325 TABLET ORAL at 10:24

## 2024-02-13 RX ADMIN — ENOXAPARIN SODIUM 40 MG: 100 INJECTION SUBCUTANEOUS at 10:21

## 2024-02-13 RX ADMIN — SACUBITRIL AND VALSARTAN 1 TABLET: 24; 26 TABLET, FILM COATED ORAL at 22:18

## 2024-02-13 RX ADMIN — CARVEDILOL 12.5 MG: 6.25 TABLET, FILM COATED ORAL at 10:18

## 2024-02-13 RX ADMIN — ASPIRIN 81 MG: 81 TABLET, COATED ORAL at 10:19

## 2024-02-13 RX ADMIN — Medication 10 ML: at 10:21

## 2024-02-13 RX ADMIN — SACUBITRIL AND VALSARTAN 1 TABLET: 24; 26 TABLET, FILM COATED ORAL at 10:17

## 2024-02-13 RX ADMIN — ACETAMINOPHEN 650 MG: 325 TABLET ORAL at 17:00

## 2024-02-13 RX ADMIN — Medication 1000 UNITS: at 10:19

## 2024-02-13 RX ADMIN — ATORVASTATIN CALCIUM 80 MG: 80 TABLET, FILM COATED ORAL at 22:18

## 2024-02-13 RX ADMIN — CLOPIDOGREL BISULFATE 75 MG: 75 TABLET ORAL at 10:09

## 2024-02-13 RX ADMIN — PIPERACILLIN AND TAZOBACTAM 3375 MG: 3; .375 INJECTION, POWDER, FOR SOLUTION INTRAVENOUS at 03:12

## 2024-02-13 RX ADMIN — PIPERACILLIN AND TAZOBACTAM 3375 MG: 3; .375 INJECTION, POWDER, FOR SOLUTION INTRAVENOUS at 14:09

## 2024-02-13 RX ADMIN — PIPERACILLIN AND TAZOBACTAM 3375 MG: 3; .375 INJECTION, POWDER, FOR SOLUTION INTRAVENOUS at 22:24

## 2024-02-13 RX ADMIN — ACETAMINOPHEN 650 MG: 325 TABLET ORAL at 22:18

## 2024-02-13 ASSESSMENT — PAIN SCALES - GENERAL
PAINLEVEL_OUTOF10: 0

## 2024-02-13 NOTE — PROGRESS NOTES
[facility] - Inpatient Rehabilitation Department   Phone: [phone]    Occupational Therapy    [x] Initial Evaluation            [] Daily Treatment Note         [] Discharge Summary      Patient: Rissa Becerra   : 1950   MRN: [id number]   Date of Service:  2024    Admitting Diagnosis:  Acute CVA (cerebrovascular accident) (HCC)  Current Admission Summary: Per H&P \"73 y.o. female who was brought in by EMS transportation for left leg and left arm weakness along with left-sided facial droop.  Patient was seen on Friday at [facility] for a stroke.  Family arrived and informed me that she had intermittent left-sided facial droop along with slurred speech that started at 3 PM Friday.  She was last seen normal at 10 AM Friday.  Patient was admitted overnight at [facility] and was found to have multifocal strokes on the CT of the MRI.  When she was discharged on Saturday the weakness in her left arm and left leg along with a left-sided face worsened.  Family called today because she fell twice due to weakness.  Patient does have left ankle weakness as well.  Stroke alert was called prior to arrival and admit the patient in the hallway.  Patient went right to CT for a scan \"  Past Medical History:  has a past medical history of Arthropathy, Asthma, Bell's palsy, Bronchitis, Diabetes mellitus ([facility]), Diverticulitis, Hyperlipidemia, Hypertension, Migraine, Polyneuropathy in diabetes ([facility]), and Vertigo.  Past Surgical History:  has a past surgical history that includes hernia repair; Carpal tunnel release; Total knee arthroplasty; Rotator cuff repair; and Percutaneous Transluminal Coronary Angio (2023).    Discharge Recommendations: Rissa Becerra scored a 9/ on the AM-PAC ADL Inpatient form. Current research shows that an AM-PAC score of 17 or less is typically not associated with a discharge to the patient's home setting. Based on the patient's AM-PAC score and their current ADL deficits,

## 2024-02-13 NOTE — CONSULTS
In patient Neurology consult        [facility] Neurology      Shawn Berg MD      Rissa Becerra  1950    Date of Service: 2/13/2024    Referring Physician: Itz Miller MD      Reason for the consult and CC: Recent right MCA ischemic stroke.    HPI:   The patient is a 73 y.o.  years old female with past medical history of hypertension, hyperlipidemia, diabetes, CHF, and recent right MCA ischemic stroke who comes to the hospital with worsening left-sided weakness and fall.  Degree severe duration persistent.  Patient also noted to have slurred speech and left facial droop.  Patient family reports patient was seen a couple of days ago at outside hospital.  Patient was noted to have right MCA territory ischemic stroke.  Patient was started on dual platelet therapy with aspirin and Plavix.  Patient did not have echocardiogram.  Chart review shows patient recently had echocardiogram which was limited in October 2023.  At the time of discharge patient was ambulating with ambulatory aid and had minimal left-sided symptoms.  Patient was noted to be progressively worsening over the weekend and had several falls including one which resulted in a left ankle fracture.  Family brought patient to this hospital's emergency room.  In the emergency room CT head showed right frontal hypoattenuation but no bleed.  CTA imaging showed left PCA occlusion and narrowing of right MCA.  Patient had MRI brain done yesterday which showed right MCA territory infarct.  Patient is admitted to the hospital.  Today patient is resting in bed.  She is noted to have dense left-sided weakness and slurred speech.  Family also reports patient has been hard time clearing her secretions.  Patient denies headache, dizziness, vision changes, or chest pain.  Other review of systems unremarkable       Constitutional:   Vitals:    02/12/24 1857 02/13/24 0030 02/13/24 0522 02/13/24 0800   BP: 126/75 117/65 (!) 170/70 (!) 147/75   Pulse: 84 79 78 78  Likely seeing extension of her stroke.  Reviewed recent hospitalization and workup.  Workup did not include echocardiogram, last echocardiogram, which was a limited study, on 10/11/2023.  Cerebrovascular disease.   Dysphagia and aspiration pneumonia  Left ankle fracture  Hypertension  Hyperlipidemia, controlled.  Diabetes, controlled.  A1c 6.3        Recommendation:     Continue supportive care  PT and OT  Speech  Aspiration precautions  Follow-up swallow study  Glycemic control  Telemetry  Neurochecks  Will discuss with Dr. Berg, further recommendations to follow  Discussed with family, primary team, nursing, speech therapy        Thank you for referring such patient. If you have any questions regarding my consult note, please don't hesitate to call me.     Guanakito Retana, NAHED - CNP    Attending Supervising Physician's Attestation Statement       The patient was seen 2/13/2024 in conjunction with the nurse practitioner with independent history, evaluation and examination. I agree with the note which has been adjusted to reflect my findings, with the addition of the following:     Patient seen and examined today.  Discussed with her family including her daughter who is a nurse  Reviewed MRI of the brain which showed acute right ischemic MCA stroke  Exam:  Right gaze preference but able to cross midline  Completely awake alert x 3  Dysarthric speech  Left facial asymmetry  Left-sided weakness, flaccid left arm  Movement left leg  Swelling left arm and leg  Hyperreflexia on left compared to right  No weakness in the right side  Decree sensation on left compared to right    Acute ischemic right MCA stroke with recent worsening likely evolution of her recent stroke or progression.  Right MCA stenosis: Maximize medical therapy  Hypertension  Diabetes  Hyperlipidemia  Recent ankle fracture    Plan:  PT and OT  Speech  Aspiration precaution  X-ray of the left arm to exclude fracture  Continue further workup for ankle

## 2024-02-13 NOTE — PLAN OF CARE
Problem: Safety - Adult  Goal: Free from fall injury  Outcome: Progressing  Note: Pt remains free from falls. Safety precautions in place. Bed in lowest position, bed wheels locked, call light with in reach, bed alarm on, yellow blanket in place, fall risk wrist band on, SAFE outside of doorway. Will continue to monitor.

## 2024-02-13 NOTE — CARE COORDINATION
Discharge Planning Note:    CM attempted to see pt for DCPA, pt in radiology. Will re-attempt.    Electronically signed by Jessica Cheema RN on 2/13/2024 at 12:04 PM

## 2024-02-13 NOTE — CARE COORDINATION
Prior Authorization submitted for ARU approval with a reference number: [id number].    ARU will continue to follow progress and update discharge plan as needed.    DELGADO CoelloN, .[id number]

## 2024-02-13 NOTE — CARE COORDINATION
Discharge Planning Note:    Chart reviewed and it appears that patient has minimal needs for discharge at this time. Risk Score 15 %     Primary Care Physician is ABY RAYO   Primary insurance is [employer]    CVA+  PT/OT evals pending  Anticipated consult to ARU    Please notify case management if any discharge needs are identified.      Case management will continue to follow progress and update discharge plan as needed.

## 2024-02-13 NOTE — PROGRESS NOTES
[facility] - Inpatient Rehabilitation Department   Phone: [phone]    Physical Therapy    [x] Initial Evaluation            [] Daily Treatment Note         [] Discharge Summary      Patient: Rissa Becerra   : 1950   MRN: [id number]   Date of Service:  2024  Admitting Diagnosis: Acute CVA (cerebrovascular accident) (HCC)  Current Admission Summary: This is a 73 y.o. female who was brought in by EMS transportation for left leg and left arm weakness along with left-sided facial droop.  Patient was seen on Friday at [facility] for a stroke.  Family arrived and informed me that she had intermittent left-sided facial droop along with slurred speech that started at 3 PM Friday.  She was last seen normal at 10 AM Friday.  Patient was admitted overnight at [facility] and was found to have multifocal strokes on the CT of the MRI.  When she was discharged on Saturday the weakness in her left arm and left leg along with a left-sided face worsened.  Family called today because she fell twice due to weakness.  Patient does have left ankle weakness as well.  Stroke alert was called prior to arrival and admit the patient in the hallway.  Patient went right to CT for a scan   Past Medical History:  has a past medical history of Arthropathy, Asthma, Bell's palsy, Bronchitis, Diabetes mellitus ([facility]), Diverticulitis, Hyperlipidemia, Hypertension, Migraine, Polyneuropathy in diabetes ([facility]), and Vertigo.  Past Surgical History:  has a past surgical history that includes hernia repair; Carpal tunnel release; Total knee arthroplasty; Rotator cuff repair; and Percutaneous Transluminal Coronary Angio (2023).  Discharge Recommendations: Rissa Becerra scored a 7/24 on the AM-PAC short mobility form. Current research shows that an AM-PAC score of 17 or less is typically not associated with a discharge to the patient's home setting. Based on the patient's AM-PAC score and their current functional mobility

## 2024-02-13 NOTE — PROCEDURES
Facility/Department: [address]  MODIFIED BARIUM SWALLOW EVALUATION    Patient: Rissa Becerra   : 1950   MRN: [id number]      Evaluation Date: 2024   Admitting Diagnosis: Elevated troponin [R79.89]  Acute left-sided weakness [R53.1]  Closed displaced fracture of lateral malleolus of left fibula, initial encounter [S82.62XA]  Acute CVA (cerebrovascular accident) (HCC) [I63.9]  Treatment Diagnosis: Dysphagia   Pain:  Denies                           Ordering MD: Dr. Itz Miller   Radiologist: Dr. Stewart   Date of Evaluation: 2024  Type of Study: Modified Barium Swallowing Study (MBS)  Diet Prior to Study:  NPO        Impression:  Modified Barium Swallow evaluation completed on 2024.Patient presents with oropharyngeal dysphagia secondary to impaired mastication, decreased labial seal, reduced bolus control, prolonged/impaired A-P bolus transport, pharyngeal pooling with delayed swallow initiation, decreased anterior hyoid movement, delayed epiglottic inversion, decreased laryngeal elevation and reduced tongue base retraction, complicated by new CVA, advanced age resulting in observed laryngeal penetration of thin and Mildly Thick (Nectar) Liquids, anterior bolus loss, impaired oral clearing with oral residue and collection of pharyngeal residue after the swallow. With all liquid consistencies pt demonstrated anterior bolus loss. With thin and Mildly Thick (Nectar) Liquids laryngeal penetration was viewed during the swallow this appeared to be due pharyngeal pooling with delayed swallow initiation and decreased airway closure. Material entered the airway largely remained above the vocal folds (intermittent contact with the vocal folds noted with thin liquids) and was ejected from the airway. No aspiration was definitively viewed during the study. With puree and Moderately Thick (honey) Liquids pooling to the underside of the epiglottis was noted with delayed swallow initiation and

## 2024-02-13 NOTE — PROGRESS NOTES
Facility/Department: [address]  SLP Clinical Swallow Evaluation and Speech Language Cognitive Assessment     Patient: Rissa Becerra   : 1950   MRN: [id number]      Evaluation Date: 2024      Admitting Dx: Elevated troponin [R79.89]  Acute left-sided weakness [R53.1]  Closed displaced fracture of lateral malleolus of left fibula, initial encounter [S82.62XA]  Acute CVA (cerebrovascular accident) ([facility]) [I63.9]  Pain: Denies                                  H&P: Rissa Becerra is a 73 y.o. female with history of DM 2, CAD, chronic combined CHF, HTN, recent diagnosis of stroke at [facility] a few days came to ER with complaints of worsening L sided weakness and falls.  Patient has slurred speech and L facial droop at [facility].  She was discharged to home with home care.  Family is very supportive.  Noted she had progressing weakness and falls.  Today, noted to have flaccid LUE/LLE.  Brought to ER for evaluation.  No CP, SOB, HA or dizziness.  Patient is awake and answering questions.  , daughters and son are at bedside with her today.  Otherwise complete ROS is negative unless listed above.     Imaging:  Chest X-ray:    IMPRESSION:  Asymmetric ground-glass opacities in the right lung may represent edema or  atelectasis.     MRI:  IMPRESSION:  1. Scattered areas of acute infarct within the right MCA territory.  No  significant mass effect or midline shift.  2. Otherwise, no acute intracranial abnormality.  3. Mild to moderate global parenchymal volume loss with mild chronic  microvascular ischemic changes.      History/Prior Level of Function:   Living Status: home   Prior Dysphagia History: Pt was evaluated by SLP at outside hospital following CVA 2/10/2024 with recommendations for Regular texture diet with thin liquids. Per family, pt has been pocketing solid foods with no awareness. Pt passed swallow screen in ED.   Prior Speech History: SLP evaluation at outside hospital

## 2024-02-13 NOTE — DISCHARGE INSTR - COC
Mobility/ADLs:  Walking   {CHP DME ADLs:736869620}  Transfer  {CHP DME ADLs:421210213}  Bathing  {CHP DME ADLs:145820355}  Dressing  {CHP DME ADLs:033774457}  Toileting  {CHP DME ADLs:400818404}  Feeding  {CHP DME ADLs:432325317}  Med Admin  {CHP DME ADLs:026357469}  Med Delivery   { VASILIY MED Delivery:955285053}    Wound Care Documentation and Therapy:        Elimination:  Continence:   Bowel: {YES / NO:19727}  Bladder: {YES / NO:19727}  Urinary Catheter: {Urinary Catheter:487279870}   Colostomy/Ileostomy/Ileal Conduit: {YES / NO:19727}       Date of Last BM: ***  No intake or output data in the 24 hours ending 02/13/24 1238  No intake/output data recorded.    Safety Concerns:     { VASILIY Safety Concerns:359516976}    Impairments/Disabilities:      { VASILIY Impairments/Disabilities:443440643}    Nutrition Therapy:  Current Nutrition Therapy:   { VASILIY Diet List:669364534}    Routes of Feeding: {CHP DME Other Feedings:307849706}  Liquids: {Slp liquid thickness:87231}  Daily Fluid Restriction: {CHP DME Yes amt example:342271620}  Last Modified Barium Swallow with Video (Video Swallowing Test): {Done Not Done Date:304088012}    Treatments at the Time of Hospital Discharge:   Respiratory Treatments: ***  Oxygen Therapy:  {Therapy; copd oxygen:96528}  Ventilator:    { CC Vent List:224223779}    Rehab Therapies: Physical Therapy  Weight Bearing Status/Restrictions: NWB left ankle  Other Medical Equipment (for information only, NOT a DME order):  {EQUIPMENT:963622366}  Other Treatments: Keep splint in place x 2 weeks.  Followup with Dr. Matthew King in 2 weeks. [phone]    Patient's personal belongings (please select all that are sent with patient):  {P DME Belongings:322714819}    RN SIGNATURE:  {Esignature:457210977}    CASE MANAGEMENT/SOCIAL WORK SECTION    Inpatient Status Date: 2/12/24    Readmission Risk Assessment Score: 15  Readmission Risk              Risk of Unplanned Readmission:  15           Discharging  to Facility/ Agency     [facility] - Acute Rehabilitation Unit  [address] [place] [address]  Phone: [phone]    / signature: Electronically signed by Jessica Cheema RN on 2/16/24 at 1:20 PM EST    PHYSICIAN SECTION    Prognosis: {Prognosis:2511741736}    Condition at Discharge: { Patient Condition:839740905}    Rehab Potential (if transferring to Rehab): {Prognosis:6254523725}    Recommended Labs or Other Treatments After Discharge: ***    Physician Certification: I certify the above information and transfer of Rissa Becerra  is necessary for the continuing treatment of the diagnosis listed and that she requires {Admit to Appropriate Level of Care:98305} for {GREATER/LESS:370978689} 30 days.     Update Admission H&P: {CHP DME Changes in HandP:578460906}    PHYSICIAN SIGNATURE:  {Esignature:777375081}

## 2024-02-13 NOTE — CARE COORDINATION
Discharge Planning Note:    CM attempted to see pt and family X2 for DCPA.  1st attempt, env services were in the room, 2nd attempt, SLP bedside working with pt.     CM will re-attempt as time allows.     Electronically signed by Jessica Cheema RN on 2/13/2024 at 9:56 AM

## 2024-02-13 NOTE — CONSULTS
[facility] Orthopedic Surgery  Consult Note    Patient: Rissa Becerra  Admit Date: 2/12/2024  Requesting Physician: Itz Miller MD  Room: [address] [place], [place] [address]    Chief complaint: LEFT ankle pain    HPI: Rissa Becerra is a 73 y.o. female who presented to [facility] ER with concerns for left sided weakness/stroke and left ankle pain.  She also has bruising to the left arm and mild pain.  No deformity noted.    Describes pain in the left ankle of moderate intensity and of aching nature since the fall which is relieved by nothing. Denies new numbness/tingling.       Independent imaging review of the left ankle via plain films demonstrated: mildly displaced distal fibula fracture.   Family at bedside.     Relevant notes, labs and other tests reviewed.  Medical History:  Past Medical History:   Diagnosis Date    Arthropathy     Asthma     Bell's palsy     on right side    Bronchitis     Diabetes mellitus (HCC)     Diverticulitis     Hyperlipidemia     Hypertension     Migraine     Polyneuropathy in diabetes (HCC)     Vertigo      Past Surgical History:   Procedure Laterality Date    CARPAL TUNNEL RELEASE      HERNIA REPAIR      PTCA  05/2023    ROTATOR CUFF REPAIR      right shoulder    TOTAL KNEE ARTHROPLASTY      right and left        Social History:    reports that she quit smoking about 57 years ago. Her smoking use included cigarettes. She started smoking about 58 years ago. She has a 0.3 pack-year smoking history. She has never used smokeless tobacco.    Family History:  History reviewed. No pertinent family history.    Medications:  ALL MEDICATIONS HAVE BEEN REVIEWED:  Scheduled:   piperacillin-tazobactam  3,375 mg IntraVENous Q8H    insulin lispro  0-8 Units SubCUTAneous TID WC    insulin lispro  0-4 Units SubCUTAneous Nightly    aspirin  81 mg Oral Daily    atorvastatin  80 mg Oral Nightly    carvedilol  12.5 mg Oral BID WC    cetirizine  10 mg Oral Daily    clopidogrel  75 mg Oral Daily    sacubitril-valsartan   1 tablet Oral BID    Vitamin D  1,000 Units Oral Daily    sodium chloride flush  5-40 mL IntraVENous 2 times per day    enoxaparin  40 mg SubCUTAneous Daily     Continuous:   dextrose      sodium chloride      sodium chloride 50 mL/hr at 02/12/24 2227     PRN:dextrose bolus **OR** dextrose bolus, glucagon (rDNA), dextrose, sodium chloride flush, sodium chloride, potassium chloride **OR** potassium alternative oral replacement **OR** potassium chloride, magnesium sulfate, ondansetron **OR** ondansetron, polyethylene glycol, acetaminophen **OR** acetaminophen, perflutren lipid microspheres, hydrALAZINE    Allergies:   Allergies   Allergen Reactions    Amoxicillin Other (See Comments)     Yeast infection    Erythromycin Other (See Comments)     Yeast infection    Gabapentin Rash       Review of Systems:  Constitutional: Negative for fever, chills, fatigue.   Skin:  Negative for pruritis, rash  Eyes: Negative for photophobia and visual disturbance.   ENT:  Negative for rhinorrhea, epistaxis, sore throat  Respiratory:  Negative for cough and shortness of breath.   Cardiovascular: Negative for chest pain.   Gastrointestinal: Negative for nausea, vomiting, diarrhea.  Genitourinary: Negative for dysuria and difficulty urinating.   Neurological: Negative for confusion, dysarthria, tremors, seizures.   Psychiatric:  Negative for depression or anxiety  Musculoskeletal:  Positive for left ankle pain    Objective:  Vitals:    02/13/24 0800   BP: (!) 147/75   Pulse: 78   Resp: 20   Temp: 98.3 °F (36.8 °C)   SpO2: 96%      Physical Examination:  GENERAL: No apparent distress, well-nourished  SKIN:  Warm and dry  EYES: Nonicteric.   ENT: Mucous membranes moist  HEAD: Normocephalic, atraumatic  RESPIRATORY: Resp easy and unlabored  CARDIOVASCULAR: Regular rate and rhythm  GI: Abdomen soft, nontender  NEURO: Awake and alert.  No speech defect  PSYCHIATRIC: Appropriate affect; not agitated  MUSCULOSKELETAL:  LEFT ankle  Inspection: On

## 2024-02-14 LAB
ANION GAP SERPL CALCULATED.3IONS-SCNC: 9 MMOL/L (ref 3–16)
BASOPHILS # BLD: 0 K/UL (ref 0–0.2)
BASOPHILS NFR BLD: 0.5 %
BUN SERPL-MCNC: 19 MG/DL (ref 7–20)
CALCIUM SERPL-MCNC: 8.7 MG/DL (ref 8.3–10.6)
CHLORIDE SERPL-SCNC: 106 MMOL/L (ref 99–110)
CO2 SERPL-SCNC: 24 MMOL/L (ref 21–32)
CREAT SERPL-MCNC: 0.6 MG/DL (ref 0.6–1.2)
DEPRECATED RDW RBC AUTO: 13.7 % (ref 12.4–15.4)
EOSINOPHIL # BLD: 0.3 K/UL (ref 0–0.6)
EOSINOPHIL NFR BLD: 3.3 %
GFR SERPLBLD CREATININE-BSD FMLA CKD-EPI: >60 ML/MIN/{1.73_M2}
GLUCOSE BLD-MCNC: 169 MG/DL (ref 70–99)
GLUCOSE BLD-MCNC: 174 MG/DL (ref 70–99)
GLUCOSE BLD-MCNC: 180 MG/DL (ref 70–99)
GLUCOSE BLD-MCNC: 244 MG/DL (ref 70–99)
GLUCOSE SERPL-MCNC: 155 MG/DL (ref 70–99)
HCT VFR BLD AUTO: 31.1 % (ref 36–48)
HGB BLD-MCNC: 10.5 G/DL (ref 12–16)
LYMPHOCYTES # BLD: 1.7 K/UL (ref 1–5.1)
LYMPHOCYTES NFR BLD: 19 %
MCH RBC QN AUTO: 30.9 PG (ref 26–34)
MCHC RBC AUTO-ENTMCNC: 33.7 G/DL (ref 31–36)
MCV RBC AUTO: 91.6 FL (ref 80–100)
MONOCYTES # BLD: 1 K/UL (ref 0–1.3)
MONOCYTES NFR BLD: 11.2 %
NEUTROPHILS # BLD: 6 K/UL (ref 1.7–7.7)
NEUTROPHILS NFR BLD: 66 %
PERFORMED ON: ABNORMAL
PLATELET # BLD AUTO: 225 K/UL (ref 135–450)
PMV BLD AUTO: 7.2 FL (ref 5–10.5)
POTASSIUM SERPL-SCNC: 3.9 MMOL/L (ref 3.5–5.1)
RBC # BLD AUTO: 3.39 M/UL (ref 4–5.2)
SODIUM SERPL-SCNC: 139 MMOL/L (ref 136–145)
WBC # BLD AUTO: 9 K/UL (ref 4–11)

## 2024-02-14 PROCEDURE — 85025 COMPLETE CBC W/AUTO DIFF WBC: CPT

## 2024-02-14 PROCEDURE — 6360000004 HC RX CONTRAST MEDICATION: Performed by: INTERNAL MEDICINE

## 2024-02-14 PROCEDURE — APPNB30 APP NON BILLABLE TIME 0-30 MINS

## 2024-02-14 PROCEDURE — 92526 ORAL FUNCTION THERAPY: CPT

## 2024-02-14 PROCEDURE — 6370000000 HC RX 637 (ALT 250 FOR IP): Performed by: INTERNAL MEDICINE

## 2024-02-14 PROCEDURE — 6370000000 HC RX 637 (ALT 250 FOR IP): Performed by: NURSE PRACTITIONER

## 2024-02-14 PROCEDURE — 97112 NEUROMUSCULAR REEDUCATION: CPT

## 2024-02-14 PROCEDURE — 2580000003 HC RX 258: Performed by: INTERNAL MEDICINE

## 2024-02-14 PROCEDURE — 6370000000 HC RX 637 (ALT 250 FOR IP): Performed by: STUDENT IN AN ORGANIZED HEALTH CARE EDUCATION/TRAINING PROGRAM

## 2024-02-14 PROCEDURE — 97530 THERAPEUTIC ACTIVITIES: CPT

## 2024-02-14 PROCEDURE — 94761 N-INVAS EAR/PLS OXIMETRY MLT: CPT

## 2024-02-14 PROCEDURE — 2060000000 HC ICU INTERMEDIATE R&B

## 2024-02-14 PROCEDURE — 94640 AIRWAY INHALATION TREATMENT: CPT

## 2024-02-14 PROCEDURE — 92507 TX SP LANG VOICE COMM INDIV: CPT

## 2024-02-14 PROCEDURE — 80048 BASIC METABOLIC PNL TOTAL CA: CPT

## 2024-02-14 PROCEDURE — 6360000002 HC RX W HCPCS: Performed by: INTERNAL MEDICINE

## 2024-02-14 PROCEDURE — 99232 SBSQ HOSP IP/OBS MODERATE 35: CPT | Performed by: NURSE PRACTITIONER

## 2024-02-14 PROCEDURE — APPSS45 APP SPLIT SHARED TIME 31-45 MINUTES

## 2024-02-14 PROCEDURE — C8929 TTE W OR WO FOL WCON,DOPPLER: HCPCS

## 2024-02-14 RX ORDER — LACTOBACILLUS RHAMNOSUS GG 10B CELL
2 CAPSULE ORAL
Status: DISCONTINUED | OUTPATIENT
Start: 2024-02-14 | End: 2024-02-16 | Stop reason: HOSPADM

## 2024-02-14 RX ORDER — FUROSEMIDE 40 MG/1
40 TABLET ORAL DAILY
Status: DISCONTINUED | OUTPATIENT
Start: 2024-02-14 | End: 2024-02-16 | Stop reason: HOSPADM

## 2024-02-14 RX ORDER — BISACODYL 10 MG
10 SUPPOSITORY, RECTAL RECTAL ONCE
Status: COMPLETED | OUTPATIENT
Start: 2024-02-14 | End: 2024-02-14

## 2024-02-14 RX ORDER — INSULIN LISPRO 100 [IU]/ML
0-4 INJECTION, SOLUTION INTRAVENOUS; SUBCUTANEOUS NIGHTLY
Status: DISCONTINUED | OUTPATIENT
Start: 2024-02-14 | End: 2024-02-16 | Stop reason: HOSPADM

## 2024-02-14 RX ORDER — INSULIN GLARGINE 100 [IU]/ML
15 INJECTION, SOLUTION SUBCUTANEOUS
Status: DISCONTINUED | OUTPATIENT
Start: 2024-02-15 | End: 2024-02-16

## 2024-02-14 RX ORDER — SPIRONOLACTONE 25 MG/1
12.5 TABLET ORAL DAILY
Status: DISCONTINUED | OUTPATIENT
Start: 2024-02-14 | End: 2024-02-16 | Stop reason: HOSPADM

## 2024-02-14 RX ORDER — IPRATROPIUM BROMIDE AND ALBUTEROL SULFATE 2.5; .5 MG/3ML; MG/3ML
1 SOLUTION RESPIRATORY (INHALATION)
Status: DISCONTINUED | OUTPATIENT
Start: 2024-02-14 | End: 2024-02-14

## 2024-02-14 RX ORDER — INSULIN LISPRO 100 [IU]/ML
0-16 INJECTION, SOLUTION INTRAVENOUS; SUBCUTANEOUS
Status: DISCONTINUED | OUTPATIENT
Start: 2024-02-14 | End: 2024-02-16 | Stop reason: HOSPADM

## 2024-02-14 RX ORDER — IPRATROPIUM BROMIDE AND ALBUTEROL SULFATE 2.5; .5 MG/3ML; MG/3ML
1 SOLUTION RESPIRATORY (INHALATION)
Status: DISCONTINUED | OUTPATIENT
Start: 2024-02-14 | End: 2024-02-16 | Stop reason: HOSPADM

## 2024-02-14 RX ADMIN — PIPERACILLIN AND TAZOBACTAM 3375 MG: 3; .375 INJECTION, POWDER, FOR SOLUTION INTRAVENOUS at 21:59

## 2024-02-14 RX ADMIN — CARVEDILOL 12.5 MG: 6.25 TABLET, FILM COATED ORAL at 10:15

## 2024-02-14 RX ADMIN — ACETAMINOPHEN 650 MG: 325 TABLET ORAL at 14:43

## 2024-02-14 RX ADMIN — ACETAMINOPHEN 650 MG: 325 TABLET ORAL at 10:16

## 2024-02-14 RX ADMIN — ENOXAPARIN SODIUM 40 MG: 100 INJECTION SUBCUTANEOUS at 10:15

## 2024-02-14 RX ADMIN — Medication 1000 UNITS: at 10:16

## 2024-02-14 RX ADMIN — ACETAMINOPHEN 650 MG: 325 TABLET ORAL at 16:08

## 2024-02-14 RX ADMIN — ASPIRIN 81 MG: 81 TABLET, COATED ORAL at 10:16

## 2024-02-14 RX ADMIN — PERFLUTREN 1.5 ML: 6.52 INJECTION, SUSPENSION INTRAVENOUS at 09:11

## 2024-02-14 RX ADMIN — Medication 10 ML: at 10:15

## 2024-02-14 RX ADMIN — SACUBITRIL AND VALSARTAN 1 TABLET: 24; 26 TABLET, FILM COATED ORAL at 21:42

## 2024-02-14 RX ADMIN — PIPERACILLIN AND TAZOBACTAM 3375 MG: 3; .375 INJECTION, POWDER, FOR SOLUTION INTRAVENOUS at 06:19

## 2024-02-14 RX ADMIN — Medication 2 CAPSULE: at 10:20

## 2024-02-14 RX ADMIN — ACETAMINOPHEN 650 MG: 325 TABLET ORAL at 21:42

## 2024-02-14 RX ADMIN — CLOPIDOGREL BISULFATE 75 MG: 75 TABLET ORAL at 10:15

## 2024-02-14 RX ADMIN — FUROSEMIDE 40 MG: 40 TABLET ORAL at 14:43

## 2024-02-14 RX ADMIN — PIPERACILLIN AND TAZOBACTAM 3375 MG: 3; .375 INJECTION, POWDER, FOR SOLUTION INTRAVENOUS at 14:46

## 2024-02-14 RX ADMIN — Medication 10 ML: at 21:42

## 2024-02-14 RX ADMIN — CARVEDILOL 12.5 MG: 6.25 TABLET, FILM COATED ORAL at 21:41

## 2024-02-14 RX ADMIN — CETIRIZINE HYDROCHLORIDE 10 MG: 10 TABLET, FILM COATED ORAL at 10:15

## 2024-02-14 RX ADMIN — SPIRONOLACTONE 12.5 MG: 25 TABLET ORAL at 14:43

## 2024-02-14 RX ADMIN — ATORVASTATIN CALCIUM 80 MG: 80 TABLET, FILM COATED ORAL at 21:42

## 2024-02-14 RX ADMIN — BISACODYL 10 MG: 10 SUPPOSITORY RECTAL at 14:43

## 2024-02-14 RX ADMIN — INSULIN GLARGINE 10 UNITS: 100 INJECTION, SOLUTION SUBCUTANEOUS at 10:16

## 2024-02-14 RX ADMIN — SACUBITRIL AND VALSARTAN 1 TABLET: 24; 26 TABLET, FILM COATED ORAL at 10:16

## 2024-02-14 RX ADMIN — IPRATROPIUM BROMIDE AND ALBUTEROL SULFATE 1 DOSE: .5; 3 SOLUTION RESPIRATORY (INHALATION) at 22:04

## 2024-02-14 RX ADMIN — IPRATROPIUM BROMIDE AND ALBUTEROL SULFATE 1 DOSE: .5; 3 SOLUTION RESPIRATORY (INHALATION) at 16:54

## 2024-02-14 ASSESSMENT — PAIN DESCRIPTION - LOCATION
LOCATION: FOOT;HEAD
LOCATION: FOOT

## 2024-02-14 ASSESSMENT — PAIN DESCRIPTION - ORIENTATION: ORIENTATION: LEFT

## 2024-02-14 ASSESSMENT — PAIN SCALES - GENERAL
PAINLEVEL_OUTOF10: 6
PAINLEVEL_OUTOF10: 8
PAINLEVEL_OUTOF10: 0

## 2024-02-14 NOTE — PROGRESS NOTES
[facility] - Inpatient Rehabilitation Department   Phone: [phone]    Occupational Therapy    [] Initial Evaluation            [x] Daily Treatment Note         [] Discharge Summary      Patient: Rissa Becerra   : 1950   MRN: [id number]   Date of Service:  2024    Admitting Diagnosis:  Acute CVA (cerebrovascular accident) (HCC)  Current Admission Summary: Per H&P \"73 y.o. female who was brought in by EMS transportation for left leg and left arm weakness along with left-sided facial droop.  Patient was seen on Friday at [facility] for a stroke.  Family arrived and informed me that she had intermittent left-sided facial droop along with slurred speech that started at 3 PM Friday.  She was last seen normal at 10 AM Friday.  Patient was admitted overnight at [facility] and was found to have multifocal strokes on the CT of the MRI.  When she was discharged on Saturday the weakness in her left arm and left leg along with a left-sided face worsened.  Family called today because she fell twice due to weakness.  Patient does have left ankle weakness as well.  Stroke alert was called prior to arrival and admit the patient in the hallway.  Patient went right to CT for a scan \"  Past Medical History:  has a past medical history of Arthropathy, Asthma, Bell's palsy, Bronchitis, Diabetes mellitus ([facility]), Diverticulitis, Hyperlipidemia, Hypertension, Migraine, Polyneuropathy in diabetes ([facility]), and Vertigo.  Past Surgical History:  has a past surgical history that includes hernia repair; Carpal tunnel release; Total knee arthroplasty; Rotator cuff repair; and Percutaneous Transluminal Coronary Angio (2023).    Discharge Recommendations: Rissa Becerra scored a 8/24 on the AM-PAC ADL Inpatient form. Current research shows that an AM-PAC score of 17 or less is typically not associated with a discharge to the patient's home setting. Based on the patient's AM-PAC score and their current ADL deficits,

## 2024-02-14 NOTE — CARE COORDINATION
Case Management Assessment  Initial Evaluation    Date/Time of Evaluation: 2/14/2024 3:17 PM  Assessment Completed by: Cristiana Mike    If patient is discharged prior to next notation, then this note serves as note for discharge by case management.    Patient Name: Rissa Becerra                   YOB: 1950  Diagnosis: Elevated troponin [R79.89]  Acute left-sided weakness [R53.1]  Closed displaced fracture of lateral malleolus of left fibula, initial encounter [S82.62XA]  Acute CVA (cerebrovascular accident) (HCC) [I63.9]                   Date / Time: 2/12/2024 12:31 PM    Patient Admission Status: Inpatient   Readmission Risk (Low < 19, Mod (19-27), High > 27): Readmission Risk Score: 15.5    Current PCP: Stephanie Oneill MD  PCP verified by CM? (P) Yes    Chart Reviewed: Yes      History Provided by: (P) Spouse, Child/Family  Patient Orientation: (P) Unable to Assess (sleeping)    Patient Cognition: (P) Other (see comment)    Hospitalization in the last 30 days (Readmission):  No    If yes, Readmission Assessment in CM Navigator will be completed.    Advance Directives:      Code Status: DNR-CCA   Patient's Primary Decision Maker is: (P) Legal Next of Kin      Discharge Planning:    Patient lives with: (P) Spouse/Significant Other Type of Home: (P) Acute Rehab  Primary Care Giver: (P) Self  Patient Support Systems include: (P) Spouse/Significant Other, Children, Family Members   Current Financial resources: (P) Medicare  Current community resources: (P) None  Current services prior to admission: (P) Durable Medical Equipment            Current DME: (P) Cane            Type of Home Care services:  (P) None    ADLS  Prior functional level: (P) Independent in ADLs/IADLs  Current functional level: (P) Assistance with the following:, Mobility, Bathing, Dressing, Toileting, Cooking, Housework, Shopping    PT AM-PAC: 7 /24  OT AM-PAC: 8 /24    Family can provide assistance at DC: (P) Yes  Would you  like Case Management to discuss the discharge plan with any other family members/significant others, and if so, who? (P) Yes  Plans to Return to Present Housing: (P) Yes  Other Identified Issues/Barriers to RETURNING to current housing: None  Potential Assistance needed at discharge: (P) N/A            Potential DME:    Patient expects to discharge to: (P) Acute rehab  Plan for transportation at discharge: (P) Family    Financial    Payor: [employer] MEDICARE / Plan: [employer] GOLD PLUS HMO / Product Type: *No Product type* /     Does insurance require precert for SNF: Yes    Potential assistance Purchasing Medications: (P) No  Meds-to-Beds request:        [facility] [id number] - [place], [place] - [address] [phone] - F [phone]  [address] [place] [address]  Phone: [phone] Fax: [phone]      Notes:    Factors facilitating achievement of predicted outcomes: Family support    Barriers to discharge: Medical complications    Additional Case Management Notes: CM met with Pt daughter at bedside, Patient is sleeping. Brianna (Dtr.) and spouse report that ARU is consulted and started Pre-cert today.     The Plan for Transition of Care is related to the following treatment goals of Elevated troponin [R79.89]  Acute left-sided weakness [R53.1]  Closed displaced fracture of lateral malleolus of left fibula, initial encounter [S82.62XA]  Acute CVA (cerebrovascular accident) (HCC) [I63.9]    IF APPLICABLE: The Patient and/or patient representative Georgia and her family were provided with a choice of provider and agrees with the discharge plan. Freedom of choice list with basic dialogue that supports the patient's individualized plan of care/goals and shares the quality data associated with the providers was provided to:     Patient Representative Name:       The Patient and/or Patient Representative Agree with the Discharge Plan?      Cristiana Mike  Case Management Department  Ph: [phone]  Fax:

## 2024-02-14 NOTE — PROGRESS NOTES
Rissa Becerra  Neurology Follow-up  [facility] Neurology    Date of Service: 2/14/2024    Subjective:   CC: Follow up today regarding: Recent right MCA ischemic stroke    Events noted. Chart and lab reviewed.  Patient appears about the same as yesterday.  Same rightward gaze preference and left-sided weakness.  Family at bedside.  Patient had echocardiogram this morning.  Family reports some movement in her left lower extremity and no movement in left upper extremity.  Patient seen by speech and had modified barium swallow yesterday.  Patient started on dysphagia diet.  Patient also had imaging on her left upper extremity which was negative.  Family does reports patient is pocketing food.  She denies headache, dizziness, neck or back pain, or vision changes.  Other review of systems unremarkable      ROS : A 10-12 system review obtained and updated today and is unremarkable except as mentioned  in my interval history.     Past medical history, social history, medication and family history reviewed.       Objective:  Exam:   Constitutional:   Vitals:    02/14/24 0115 02/14/24 0410 02/14/24 0726 02/14/24 0741   BP: (!) 146/70 (!) 149/68 (!) 165/75    Pulse: 78 68 78    Resp: 20 20 20    Temp: 98.3 °F (36.8 °C) 97.9 °F (36.6 °C) 98.6 °F (37 °C)    TempSrc: Oral Oral Oral    SpO2: 96% 96% 95%    Weight:    95.3 kg (210 lb)   Height:         General appearance:  Normal development and appear in no acute distress.   Mental Status:   Oriented to person, place, problem   Memory: Good immediate recall.  Intact remote memory  Good attention span and concentration.  Language: Dysarthric speech  Good fund of Knowledge.   Cranial Nerves:   II:   Pupils: equal, round, reactive to light  III,IV,VI: Rightward gaze preference, no nystagmus  V: Facial sensation is intact  VII: Facial strength and movements: Left asymmetry  XII: Tongue movements are normal  Musculoskeletal: Left upper extremity flaccid, 2/5 left lower extremity.

## 2024-02-14 NOTE — CONSULTS
Rissa Becerra  2/13/2024  [id number]    Chief Complaint: Acute CVA (cerebrovascular accident) (HCC)    Subjective   HPI: Rissa Becerra is a 73 y.o. female with PMHx notable for HTN, HLD, type 2 diabetes mellitus, CHF, recent R MCA stroke (seen at OSH, started on DAPT and discharged home), who presented on 2/12/24 with worsening facial droop, left visual field loss, left sided weakness. She was ambulating with device, with minimal left sided weakness at time of prior hospital discharge, gradually developed worsening weakness resulting in several falls, including one causing L ankle fracture. She returned to [facility] ED. CT Head showed R frontal hypoattenuation. CTA Head/Neck showed L PCA occlusion, R M2 and M3 segmental narrowing. MRI Brain showed R MCA territory infarct. Neurology was consulted, recommending DAPT, statin, cardiac event monitor. Orthopedics Concomitant injuries included: L ankle fracture, for which Ortho was consulted, recommending NWB LLE and non-op management in the context of recent stroke. Hospital course complicated by: aspiration pneumonia.     Currently patient reports left visual field deficit, dysarthria, dysphagia, dense left hemiparesis, left hemisensory loss. Denies any increased muscle tone, or spasm.       Past Medical History:   Diagnosis Date    Arthropathy     Asthma     Bell's palsy     on right side    Bronchitis     Diabetes mellitus (HCC)     Diverticulitis     Hyperlipidemia     Hypertension     Migraine     Polyneuropathy in diabetes (HCC)     Vertigo        Past Surgical History:   Procedure Laterality Date    CARPAL TUNNEL RELEASE      HERNIA REPAIR      PTCA  05/2023    ROTATOR CUFF REPAIR      right shoulder    TOTAL KNEE ARTHROPLASTY      right and left        Social History     Tobacco Use    Smoking status: Former     Current packs/day: 0.00     Average packs/day: 0.3 packs/day for 1 year (0.3 ttl pk-yrs)     Types: Cigarettes     Start date: 1966     Quit date: 1967

## 2024-02-14 NOTE — PROGRESS NOTES
maintain balance  Dynamic Sitting Balance: poor (-): requires max (A) to maintain balance  Static Standing Balance: poor (-): requires max (A) to maintain balance  Dynamic Standing Balance: poor (-): requires max (A) to maintain balance  Comments:    Other Therapeutic Interventions  Pt seated at EOB and completing cervical ROM with passive overpressure to L. Cuing to cross midline with gaze to see  to L. Closed chair strengthening to LUE with facilitory cues for triceps activation and pushing with LUE while at EOB.    Functional Outcomes  AM-PAC Inpatient Mobility Raw Score : 7              Cognition  Overall Cognitive Status: Impaired  Arousal/Alertness: appropriate responses to stimuli  Following Commands: follows one step commands with repetition  Attention Span: attends with cues to redirect  Memory: decreased recall of recent events  Safety Judgement: good awareness of safety precautions  Problem Solving: assistance required to generate solutions, decreased awareness of errors  Insights: decreased awareness of deficits  Initiation: requires cues for all  Sequencing: requires cues for all  Orientation:    alert and oriented x 4  Command Following:   accurately follows one step commands    Education  Barriers To Learning: cognition  Patient Education: patient educated on goals, PT role and benefits, plan of care, general safety, discharge recommendations  Learning Assessment:  patient verbalizes understanding, would benefit from continued reinforcement    Assessment  Activity Tolerance: Fair  Impairments Requiring Therapeutic Intervention: decreased functional mobility, decreased ADL status, decreased ROM, decreased strength, decreased cognition, decreased endurance, decreased sensation, decreased balance, decreased fine motor control, decreased coordination, decreased posture  Prognosis: good and fair  Clinical Assessment: Pt presents as far below her baseline function secondary to recent CVA. Pt  demonstrates moderately improved sitting balance this date, tolerating sitting at EOB x15 minutes with variable assistance required but ranging from CGA to max A. Pt also tolerates pivot transfer this date with assistance of 2 skilled therapists. Pt is not appropriate for a discharge to home environment at this time, and requires intense therapy services to promote return to PLOF.  Safety Interventions: patient left in bed, family/caregiver present, and Transport arriving and taking pt JAZZY to CT scan    Plan  Frequency: 5-7 x/week  Current Treatment Recommendations: strengthening, ROM, balance training, functional mobility training, transfer training, gait training, stair training, endurance training, neuromuscular re-education, patient/caregiver education, cognitive reorientation, home exercise program, and safety education    Goals  [place] Goals[place] [facility] Goals:  Time Frame: Prior to D/C  Patient will complete bed mobility at maximum assistance   Patient will complete transfers at maximum assistance   Patient will complete manual w/c propulsion 50 ft at contact guard assistance    Above goals reviewed on 2/14/2024.  All goals are ongoing at this time unless indicated above.      Therapy Session Time      Individual Group Co-treatment   Time In      1307   Time Out      1400   Minutes      53     Timed Code Treatment Minutes:   53  Total Treatment Minutes:  53       Electronically Signed By: Milan Pretty, PT  Milan Pretty PT, DPT, [id number]

## 2024-02-14 NOTE — PROGRESS NOTES
[facility] Orthopedic Surgery  Progress Note      Chief complaint: LEFT ankle pain    Subjective: Patient is seen sitting up in the bed. Denies new issues.  Mild pain in the ankle and moderate pain in the left upper extremity    Independent imaging review of the left ankle via plain films demonstrated: mildly displaced distal fibula fracture. NO fx noted on left upper extremity films.     Family at bedside.     Review of Systems:  Constitutional: Negative for fever, chills, fatigue.   Skin:  Negative for pruritis, rash  Eyes: Negative for photophobia and visual disturbance.   ENT:  Negative for rhinorrhea, epistaxis, sore throat  Respiratory:  Negative for cough and shortness of breath.   Cardiovascular: Negative for chest pain.   Gastrointestinal: Negative for nausea, vomiting, diarrhea.  Genitourinary: Negative for dysuria and difficulty urinating.   Neurological: Negative for confusion, dysarthria, tremors, seizures.   Psychiatric:  Negative for depression or anxiety  Musculoskeletal:  Positive for left ankle pain    Objective:  Vitals:    02/14/24 0726   BP: (!) 165/75   Pulse: 78   Resp: 20   Temp: 98.6 °F (37 °C)   SpO2: 95%      Physical Examination:  GENERAL: No apparent distress, well-nourished  SKIN:  Warm and dry  EYES: Nonicteric.   ENT: Mucous membranes moist  HEAD: Normocephalic, atraumatic  RESPIRATORY: Resp easy and unlabored  CARDIOVASCULAR: Regular rate and rhythm  GI: Abdomen soft, nontender  NEURO: Awake and alert.  No speech defect  PSYCHIATRIC: Appropriate affect; not agitated  MUSCULOSKELETAL:  LEFT ankle  Inspection: On exam there are no ulcerations, rashes or lesions about the left ankle.   There is pain to palpation of the left lateral ankle.  Posterior leg splint in place is well-fitted.   Left arm scattered bruising.  Mild swelling.  No deformity.  No muscle strength of any muscle groups about the left arm. Reports intact sensation. Left radial pulse intact.  Tolerates passive ROM left

## 2024-02-14 NOTE — PROGRESS NOTES
Admit Date: 2/12/2024  Diet: ADULT DIET; Dysphagia - Minced and Moist; Mildly Thick (Nectar); No Drinking Straws    CC: Stroke    Interval history:   Overnight, there were no acute events. Patient's vitals remained stable    Patient was seen this morning in bed with family at bedside. She endorses that she is doing okay. Her complaint is of being constipated and hoping for something to help her have a BM. Discussed care with family at bedside as well. They are agreeable with plan.  Patient denies fevers, chills, nausea, vomiting, chest pain, diarrhea, constipation, dysuria, urinary frequency or urgency.     Plan:     -Echo pending  -Continue IV Zosyn  -Resume home Lasix + Aldactone  -Suppository now and if unable to have a BM, try tap water enema  -Pain control for left arm contusion with underlying glenohumeral arthritis    Assessment:   Rissa Becerra is a 73 y.o. female with PMH of T2DM, CAD, Systolic HF who was admitted with acute CVA    Acute CVA  Continue ASA, Plavix and statin  Neuro consult appreciated  Event monitor as OP  F/U Echo  Telemetry to r/o arrhythmia  PT/OT/SLP recommend ARU  PMR consulted for ARU  MRI brain noted  Aspiration PNA  Continue IV Zosyn  Continue IVF today  SLP eval appreciated  DM 2  Hold MTF  Resume Lantus 10 U in AM  SSI  Hypoglycemia orders  CAD  Continue ASA, Plavix, Lipitor  Chronic combined CHF  Continue Entresto  Resume Lasix tomorrow after IVF finish    Code Status: DNR-CCA   FEN: ADULT DIET; Dysphagia - Minced and Moist; Mildly Thick (Nectar); No Drinking Straws   DVT PPX: [x] Lovenox, []Heparin, [] SCDs,[] Eliquis, [] Xarelto, [] Coumadin  DISPO: [x] GMF, [] ICU, [] CVU    Kirill Cantrell MD  2/14/2024,  2:20 PM    This note was likely completed using voice recognition technology and may contain unintended errors.     Objective:   Vitals:   T-max:  Patient Vitals for the past 8 hrs:   BP Temp Temp src Pulse Resp SpO2 Weight   02/14/24 1330 108/65 99 °F (37.2 °C) Oral 73    XR CHEST PORTABLE   Final Result   Asymmetric ground-glass opacities in the right lung may represent edema or   atelectasis.         CTA HEAD NECK W CONTRAST   Final Result   No significant abnormality of the neck vessels.  Approximately 30% stenosis   of the left internal carotid artery in the region of the bulb.      Occlusion of the left posterior cerebral artery in its P1 segment.  There is   tapering of the distal M2 and M3 segments in the right middle cerebral artery   territory with decreased visualization of vessels in the right parietal   region.  No other significant abnormality of the intracranial circulation.         CT HEAD WO CONTRAST   Final Result   1.  No acute intracranial bleed.   2. Faint, mildly confluent hypoattenuating areas at the right frontal white   matter could reflect sequela of subacute stroke.   3. Senescent changes.   4. White matter disease described is typical of microvascular ischemic   disease or as sequela of dysmyelinating/demyelinating processes.   Per stroke alert protocol, the results were called by Dr. James Toro to   WILLIAMGARY HAIDER on 2/12/2024 at 12:51.             Medications:     Scheduled Meds:   lactobacillus  2 capsule Oral Daily with breakfast    bisacodyl  10 mg Rectal Once    ipratropium 0.5 mg-albuterol 2.5 mg  1 Dose Inhalation Q4H WA RT    spironolactone  12.5 mg Oral Daily    furosemide  40 mg Oral Daily    [START ON 2/15/2024] insulin glargine  15 Units SubCUTAneous Daily with breakfast    insulin lispro  0-16 Units SubCUTAneous TID WC    insulin lispro  0-4 Units SubCUTAneous Nightly    piperacillin-tazobactam  3,375 mg IntraVENous Q8H    acetaminophen  650 mg Oral TID    aspirin  81 mg Oral Daily    atorvastatin  80 mg Oral Nightly    carvedilol  12.5 mg Oral BID WC    cetirizine  10 mg Oral Daily    clopidogrel  75 mg Oral Daily    sacubitril-valsartan  1 tablet Oral BID    Vitamin D  1,000 Units Oral Daily    sodium chloride flush  5-40 mL IntraVENous

## 2024-02-14 NOTE — PROGRESS NOTES
Facility/Department: [address]  Speech Language Pathology   Dysphagia and Speech Language/Cognitive Treatment Note    Patient: Rissa Becerra   : 1950   MRN: [id number]      Evaluation Date: 2024      Admitting Dx: Elevated troponin [R79.89]  Acute left-sided weakness [R53.1]  Closed displaced fracture of lateral malleolus of left fibula, initial encounter [S82.62XA]  Acute CVA (cerebrovascular accident) (HCC) [I63.9]  Treatment Diagnosis: Oropharyngeal dysphagia, Cognitive-Linguistic Deficits, Dysarthria   Pain: Denies                                                Subjective:  Pt seen upright in bed, alert and agreeable to participate in therapy session. Pt's family at bedside including , daughters, and grandson.     Modified Barium Swallow (MBS) Results 2024:  Modified Barium Swallow evaluation completed on 2024. Patient presents with oropharyngeal dysphagia secondary to impaired mastication, decreased labial seal, reduced bolus control, prolonged/impaired A-P bolus transport, pharyngeal pooling with delayed swallow initiation, decreased anterior hyoid movement, delayed epiglottic inversion, decreased laryngeal elevation and reduced tongue base retraction, complicated by new CVA, advanced age resulting in observed laryngeal penetration of thin and Mildly Thick (Nectar) Liquids, anterior bolus loss, impaired oral clearing with oral residue and collection of pharyngeal residue after the swallow. With all liquid consistencies pt demonstrated anterior bolus loss. With thin and Mildly Thick (Nectar) Liquids laryngeal penetration was viewed during the swallow this appeared to be due pharyngeal pooling with delayed swallow initiation and decreased airway closure. Material entered the airway largely remained above the vocal folds (intermittent contact with the vocal folds noted with thin liquids) and was ejected from the airway. No aspiration was definitively viewed during the

## 2024-02-14 NOTE — PROGRESS NOTES
02/14/24 1701   RT Protocol   History Pulmonary Disease 2   Respiratory pattern 0   Breath sounds 2   Cough 3   Bronchodilator Assessment Score 7

## 2024-02-14 NOTE — PROGRESS NOTES
Hospitalist Progress Note      PCP: Stephanie Oneill MD    Date of Admission: 2/12/2024    Chief Complaint: Stroke    Hospital Course:  73 y.o. female with history of DM 2, CAD, chronic combined CHF, HTN, recent diagnosis of stroke at [facility] a few days came to ER with complaints of worsening L sided weakness and falls.  Patient has slurred speech and L facial droop at [facility].  Admitted as inpatient for acute CVA.      MRI Brain:  IMPRESSION:  1. Scattered areas of acute infarct within the right MCA territory.  No  significant mass effect or midline shift.  2. Otherwise, no acute intracranial abnormality.  3. Mild to moderate global parenchymal volume loss with mild chronic  microvascular ischemic changes.    Echo:  pending    On Zosyn for aspiration PNA.    Followed by Neuro, PMR and Ortho.    To ARU upon DC.    Subjective:  Patient remains flaccid on L side with L neglect.  Dysarthria.  No CP, SOB, HA or abdominal pain.       Medications:  Reviewed    Infusion Medications    dextrose      sodium chloride      sodium chloride 50 mL/hr at 02/12/24 2227     Scheduled Medications    piperacillin-tazobactam  3,375 mg IntraVENous Q8H    insulin lispro  0-8 Units SubCUTAneous TID WC    insulin lispro  0-4 Units SubCUTAneous Nightly    acetaminophen  650 mg Oral TID    [START ON 2/14/2024] insulin glargine  10 Units SubCUTAneous Daily with breakfast    aspirin  81 mg Oral Daily    atorvastatin  80 mg Oral Nightly    carvedilol  12.5 mg Oral BID WC    cetirizine  10 mg Oral Daily    clopidogrel  75 mg Oral Daily    sacubitril-valsartan  1 tablet Oral BID    Vitamin D  1,000 Units Oral Daily    sodium chloride flush  5-40 mL IntraVENous 2 times per day    enoxaparin  40 mg SubCUTAneous Daily     PRN Meds: dextrose bolus **OR** dextrose bolus, glucagon (rDNA), dextrose, sodium chloride flush, sodium chloride, potassium chloride **OR** potassium alternative oral replacement **OR** potassium chloride,  combined CHF  Continue Entresto  Resume Lasix tomorrow after IVF finish    DVT Prophylaxis: Lovenox  Diet: ADULT DIET; Dysphagia - Minced and Moist; Moderately Thick (Honey); No Drinking Straws  Code Status: DNR-CCA  PT/OT Eval Status: Following    Dispo - ARU    Discussed with patient, nursing and CM.    Discussed with Ji Retana (Neuro NP).  Echo requested.  OP Event monitor.    To ARU in next 24-48 hrs.    Itz Miller MD

## 2024-02-14 NOTE — RT PROTOCOL NOTE
RT Nebulizer Bronchodilator Protocol Note    There is a bronchodilator order in the chart from a provider indicating to follow the RT Bronchodilator Protocol and there is an “Initiate RT Bronchodilator Protocol” order as well (see protocol at bottom of note).    CXR Findings:  No results found.    The findings from the last RT Protocol Assessment were as follows:  Smoking: Chronic pulmonary disease  Respiratory Pattern: Regular pattern and RR 12-20 bpm  Breath Sounds: Slightly diminished and/or crackles  Cough: Weak, non-productive  Indication for Bronchodilator Therapy:    Bronchodilator Assessment Score: 7    Aerosolized bronchodilator medication orders have been revised according to the RT Nebulizer Bronchodilator Protocol below.    Respiratory Therapist to perform RT Therapy Protocol Assessment initially then follow the protocol.  Repeat RT Therapy Protocol Assessment PRN for score 0-3 or on second treatment, BID, and PRN for scores above 3.    No Indications - adjust the frequency to every 6 hours PRN wheezing or bronchospasm, if no treatments needed after 48 hours then discontinue using Per Protocol order mode.     If indication present, adjust the RT bronchodilator orders based on the Bronchodilator Assessment Score as indicated below.  If a patient is on this medication at home then do not decrease Frequency below that used at home.    0-3 - enter or revise RT bronchodilator order(s) to equivalent RT Bronchodilator order with Frequency of every 4 hours PRN for wheezing or increased work of breathing using Per Protocol order mode.       4-6 - enter or revise RT Bronchodilator order(s) to two equivalent RT bronchodilator orders with one order with BID Frequency and one order with Frequency of every 4 hours PRN wheezing or increased work of breathing using Per Protocol order mode.         7-10 - enter or revise RT Bronchodilator order(s) to two equivalent RT bronchodilator orders with one order with TID  Frequency and one order with Frequency of every 4 hours PRN wheezing or increased work of breathing using Per Protocol order mode.       11-13 - enter or revise RT Bronchodilator order(s) to one equivalent RT bronchodilator order with QID Frequency and an Albuterol order with Frequency of every 4 hours PRN wheezing or increased work of breathing using Per Protocol order mode.      Greater than 13 - enter or revise RT Bronchodilator order(s) to one equivalent RT bronchodilator order with every 4 hours Frequency and an Albuterol order with Frequency of every 2 hours PRN wheezing or increased work of breathing using Per Protocol order mode.     RT to enter RT Home Evaluation for COPD & MDI Assessment order using Per Protocol order mode.    Electronically signed by MITCH MCCABE RCP on 2/14/2024 at 5:02 PM

## 2024-02-14 NOTE — PROGRESS NOTES
Rissa Becerra  2/14/2024  [id number]    Chief Complaint: Acute CVA (cerebrovascular accident) (HCC)    Subjective   CT L Shoulder obtained to r/o fracture, demonstrated GH and AC joint OA, no acute fracture. Echo completed, limited by body habitus, but LVEF estimated 55-60%, indeterminate diastolic function, bubble study without evidence of shunting.     Patient complaining of abdominal cramping, feels this is related to constipation. Reports that she was able to wiggle her toes on the left earlier today. Otherwise, degree of left sided weakness is similar to prior. Left inattention and left visual field deficit are stable. Persistent left ankle pain.          Objective   Objective:  Patient Vitals for the past 24 hrs:   BP Temp Temp src Pulse Resp SpO2 Weight   02/14/24 1657 -- -- -- -- -- 95 % --   02/14/24 1330 108/65 99 °F (37.2 °C) Oral 73 20 94 % --   02/14/24 0741 -- -- -- -- -- -- 95.3 kg (210 lb)   02/14/24 0726 (!) 165/75 98.6 °F (37 °C) Oral 78 20 95 % --   02/14/24 0410 (!) 149/68 97.9 °F (36.6 °C) Oral 68 20 96 % --   02/14/24 0115 (!) 146/70 98.3 °F (36.8 °C) Oral 78 20 96 % --   02/13/24 1930 (!) 157/70 98.2 °F (36.8 °C) Oral 80 18 94 % --     Gen: No distress, pleasant.   HEENT: Normocephalic, atraumatic.   CV: No audible murmurs, well perfused extremities  Resp: No respiratory distress. No increased WOB  Abd: Soft, nontender nondistended  Ext: Left ankle in splint w/ ACE wrap.   Neuro: Alert. Left facial droop. Left hemiplegia. Left inattention.   Psych: Mood is good, joking with examiner and her family.     Laboratory data: Available via EMR.     Therapy progress:    PT    Rolling: Level of difficulty - A Lot   Sit to Stand from a Chair: Level of difficulty - Total  Supine to Sit: Level of difficulty - Total     Bed to Chair: Physical Assistance Required - Total  Ambulate Across Room: Physical Assistance Required - Total  Ascend 3-5 Steps With HR: Physical Assistance Required -

## 2024-02-15 LAB
ANION GAP SERPL CALCULATED.3IONS-SCNC: 11 MMOL/L (ref 3–16)
BACTERIA URNS QL MICRO: NORMAL /HPF
BASOPHILS # BLD: 0.1 K/UL (ref 0–0.2)
BASOPHILS NFR BLD: 0.8 %
BILIRUB UR QL STRIP.AUTO: NEGATIVE
BUN SERPL-MCNC: 21 MG/DL (ref 7–20)
CALCIUM SERPL-MCNC: 9 MG/DL (ref 8.3–10.6)
CHARACTER UR: NORMAL
CHLORIDE SERPL-SCNC: 107 MMOL/L (ref 99–110)
CLARITY UR: CLEAR
CO2 SERPL-SCNC: 24 MMOL/L (ref 21–32)
COLOR UR: YELLOW
CREAT SERPL-MCNC: 0.8 MG/DL (ref 0.6–1.2)
DEPRECATED RDW RBC AUTO: 14 % (ref 12.4–15.4)
EOSINOPHIL # BLD: 0.6 K/UL (ref 0–0.6)
EOSINOPHIL NFR BLD: 6.7 %
EPI CELLS #/AREA URNS AUTO: 0 /HPF (ref 0–5)
GFR SERPLBLD CREATININE-BSD FMLA CKD-EPI: >60 ML/MIN/{1.73_M2}
GLUCOSE BLD-MCNC: 122 MG/DL (ref 70–99)
GLUCOSE BLD-MCNC: 143 MG/DL (ref 70–99)
GLUCOSE BLD-MCNC: 157 MG/DL (ref 70–99)
GLUCOSE BLD-MCNC: 208 MG/DL (ref 70–99)
GLUCOSE SERPL-MCNC: 162 MG/DL (ref 70–99)
GLUCOSE UR STRIP.AUTO-MCNC: NEGATIVE MG/DL
HCT VFR BLD AUTO: 31.7 % (ref 36–48)
HGB BLD-MCNC: 10.8 G/DL (ref 12–16)
HGB UR QL STRIP.AUTO: ABNORMAL
KETONES UR STRIP.AUTO-MCNC: NEGATIVE MG/DL
LEUKOCYTE ESTERASE UR QL STRIP.AUTO: NEGATIVE
LYMPHOCYTES # BLD: 2.2 K/UL (ref 1–5.1)
LYMPHOCYTES NFR BLD: 23 %
MAGNESIUM SERPL-MCNC: 1.8 MG/DL (ref 1.8–2.4)
MCH RBC QN AUTO: 31.2 PG (ref 26–34)
MCHC RBC AUTO-ENTMCNC: 33.9 G/DL (ref 31–36)
MCV RBC AUTO: 92.1 FL (ref 80–100)
MONOCYTES # BLD: 1.1 K/UL (ref 0–1.3)
MONOCYTES NFR BLD: 11.2 %
NEUTROPHILS # BLD: 5.6 K/UL (ref 1.7–7.7)
NEUTROPHILS NFR BLD: 58.3 %
NITRITE UR QL STRIP.AUTO: NEGATIVE
PERFORMED ON: ABNORMAL
PH UR STRIP.AUTO: 6 [PH] (ref 5–8)
PLATELET # BLD AUTO: 274 K/UL (ref 135–450)
PMV BLD AUTO: 7.1 FL (ref 5–10.5)
POTASSIUM SERPL-SCNC: 3.9 MMOL/L (ref 3.5–5.1)
PROT UR STRIP.AUTO-MCNC: NEGATIVE MG/DL
RBC # BLD AUTO: 3.45 M/UL (ref 4–5.2)
RBC CLUMPS #/AREA URNS AUTO: 3 /HPF (ref 0–4)
SODIUM SERPL-SCNC: 142 MMOL/L (ref 136–145)
SP GR UR STRIP.AUTO: 1.01 (ref 1–1.03)
UA COMPLETE W REFLEX CULTURE PNL UR: ABNORMAL
UA DIPSTICK W REFLEX MICRO PNL UR: YES
URN SPEC COLLECT METH UR: ABNORMAL
UROBILINOGEN UR STRIP-ACNC: 1 E.U./DL
WBC # BLD AUTO: 9.6 K/UL (ref 4–11)
WBC #/AREA URNS AUTO: 0 /HPF (ref 0–5)

## 2024-02-15 PROCEDURE — 99232 SBSQ HOSP IP/OBS MODERATE 35: CPT

## 2024-02-15 PROCEDURE — 97530 THERAPEUTIC ACTIVITIES: CPT

## 2024-02-15 PROCEDURE — 97112 NEUROMUSCULAR REEDUCATION: CPT

## 2024-02-15 PROCEDURE — 85025 COMPLETE CBC W/AUTO DIFF WBC: CPT

## 2024-02-15 PROCEDURE — 6360000002 HC RX W HCPCS: Performed by: INTERNAL MEDICINE

## 2024-02-15 PROCEDURE — 92526 ORAL FUNCTION THERAPY: CPT

## 2024-02-15 PROCEDURE — 36415 COLL VENOUS BLD VENIPUNCTURE: CPT

## 2024-02-15 PROCEDURE — 81001 URINALYSIS AUTO W/SCOPE: CPT

## 2024-02-15 PROCEDURE — 6370000000 HC RX 637 (ALT 250 FOR IP): Performed by: INTERNAL MEDICINE

## 2024-02-15 PROCEDURE — 92507 TX SP LANG VOICE COMM INDIV: CPT

## 2024-02-15 PROCEDURE — 94640 AIRWAY INHALATION TREATMENT: CPT

## 2024-02-15 PROCEDURE — 2060000000 HC ICU INTERMEDIATE R&B

## 2024-02-15 PROCEDURE — 6370000000 HC RX 637 (ALT 250 FOR IP): Performed by: NURSE PRACTITIONER

## 2024-02-15 PROCEDURE — 83735 ASSAY OF MAGNESIUM: CPT

## 2024-02-15 PROCEDURE — 80048 BASIC METABOLIC PNL TOTAL CA: CPT

## 2024-02-15 PROCEDURE — 97535 SELF CARE MNGMENT TRAINING: CPT

## 2024-02-15 PROCEDURE — 97129 THER IVNTJ 1ST 15 MIN: CPT

## 2024-02-15 PROCEDURE — 97110 THERAPEUTIC EXERCISES: CPT

## 2024-02-15 PROCEDURE — 2580000003 HC RX 258: Performed by: INTERNAL MEDICINE

## 2024-02-15 PROCEDURE — 6370000000 HC RX 637 (ALT 250 FOR IP): Performed by: STUDENT IN AN ORGANIZED HEALTH CARE EDUCATION/TRAINING PROGRAM

## 2024-02-15 RX ADMIN — INSULIN GLARGINE 15 UNITS: 100 INJECTION, SOLUTION SUBCUTANEOUS at 09:30

## 2024-02-15 RX ADMIN — ACETAMINOPHEN 650 MG: 325 TABLET ORAL at 09:30

## 2024-02-15 RX ADMIN — IPRATROPIUM BROMIDE AND ALBUTEROL SULFATE 1 DOSE: .5; 3 SOLUTION RESPIRATORY (INHALATION) at 22:20

## 2024-02-15 RX ADMIN — CARVEDILOL 12.5 MG: 6.25 TABLET, FILM COATED ORAL at 09:30

## 2024-02-15 RX ADMIN — SACUBITRIL AND VALSARTAN 1 TABLET: 24; 26 TABLET, FILM COATED ORAL at 09:35

## 2024-02-15 RX ADMIN — SPIRONOLACTONE 12.5 MG: 25 TABLET ORAL at 09:30

## 2024-02-15 RX ADMIN — Medication 2 CAPSULE: at 09:30

## 2024-02-15 RX ADMIN — PIPERACILLIN AND TAZOBACTAM 3375 MG: 3; .375 INJECTION, POWDER, FOR SOLUTION INTRAVENOUS at 05:21

## 2024-02-15 RX ADMIN — CARVEDILOL 12.5 MG: 6.25 TABLET, FILM COATED ORAL at 18:00

## 2024-02-15 RX ADMIN — PIPERACILLIN AND TAZOBACTAM 3375 MG: 3; .375 INJECTION, POWDER, FOR SOLUTION INTRAVENOUS at 14:35

## 2024-02-15 RX ADMIN — ACETAMINOPHEN 650 MG: 325 TABLET ORAL at 14:35

## 2024-02-15 RX ADMIN — FUROSEMIDE 40 MG: 40 TABLET ORAL at 09:30

## 2024-02-15 RX ADMIN — INSULIN LISPRO 4 UNITS: 100 INJECTION, SOLUTION INTRAVENOUS; SUBCUTANEOUS at 12:35

## 2024-02-15 RX ADMIN — Medication 1000 UNITS: at 09:30

## 2024-02-15 RX ADMIN — Medication 10 ML: at 09:30

## 2024-02-15 RX ADMIN — ACETAMINOPHEN 650 MG: 325 TABLET ORAL at 21:21

## 2024-02-15 RX ADMIN — ATORVASTATIN CALCIUM 80 MG: 80 TABLET, FILM COATED ORAL at 21:21

## 2024-02-15 RX ADMIN — PIPERACILLIN AND TAZOBACTAM 3375 MG: 3; .375 INJECTION, POWDER, FOR SOLUTION INTRAVENOUS at 21:32

## 2024-02-15 RX ADMIN — ASPIRIN 81 MG: 81 TABLET, COATED ORAL at 09:30

## 2024-02-15 RX ADMIN — CETIRIZINE HYDROCHLORIDE 10 MG: 10 TABLET, FILM COATED ORAL at 09:30

## 2024-02-15 RX ADMIN — Medication 10 ML: at 21:21

## 2024-02-15 RX ADMIN — CLOPIDOGREL BISULFATE 75 MG: 75 TABLET ORAL at 09:30

## 2024-02-15 RX ADMIN — SACUBITRIL AND VALSARTAN 1 TABLET: 24; 26 TABLET, FILM COATED ORAL at 21:21

## 2024-02-15 RX ADMIN — ENOXAPARIN SODIUM 40 MG: 100 INJECTION SUBCUTANEOUS at 09:30

## 2024-02-15 ASSESSMENT — PAIN DESCRIPTION - LOCATION
LOCATION: FOOT
LOCATION: HEAD

## 2024-02-15 ASSESSMENT — PAIN SCALES - GENERAL
PAINLEVEL_OUTOF10: 4
PAINLEVEL_OUTOF10: 5

## 2024-02-15 NOTE — PROGRESS NOTES
Rissa Becerra  2/15/2024  [id number]    Chief Complaint: Acute CVA (cerebrovascular accident) (HCC)    Subjective   Continues on treatment for aspiration PNA. Denies fevers, chills, chest pain, cough, dyspnea. Hospital medicine would like to monitor her for an additional day before rehab. Planning void trial today.          Objective   Objective:  Patient Vitals for the past 24 hrs:   BP Temp Temp src Pulse Resp SpO2 Weight   02/15/24 1645 130/74 98.8 °F (37.1 °C) Oral 71 16 93 % --   02/15/24 1133 105/63 98.4 °F (36.9 °C) Oral 73 16 95 % --   02/15/24 0730 (!) 154/70 98.4 °F (36.9 °C) Oral 72 16 96 % --   02/15/24 0502 -- -- -- -- -- -- 96.3 kg (212 lb 6.4 oz)   02/15/24 0445 102/76 98.1 °F (36.7 °C) Axillary 67 18 95 % --   02/14/24 2330 (!) 159/92 98 °F (36.7 °C) Axillary 74 18 96 % --   02/14/24 1915 138/71 98.6 °F (37 °C) Oral 79 20 95 % --     Gen: No distress, pleasant.   HEENT: Normocephalic, atraumatic.   CV: No audible murmurs, well perfused extremities  Resp: No respiratory distress. No increased WOB  Abd: Soft, nontender nondistended  Ext: Left ankle in splint w/ ACE wrap.   Neuro: Alert. Left facial droop. Left hemiplegia. Left inattention.     Laboratory data: Available via EMR.     Therapy progress:    PT    Rolling: Level of difficulty - Total   Sit to Stand from a Chair: Level of difficulty - Total  Supine to Sit: Level of difficulty - Total     Bed to Chair: Physical Assistance Required - Total  Ambulate Across Room: Physical Assistance Required - Total  Ascend 3-5 Steps With HR: Physical Assistance Required - Total    OT    Eating: Physical Assistance Required - A Lot  Grooming: Physical Assistance Required - A Little  LB Dressing: Physical Assistance Required - Total  UB Dressing: Physical Assistance Required - Total  Bathing: Physical Assistance Required - Total  Toileting: Physical Assistance Required - Total    SLP         Body mass index is 38.85 kg/m².       Assessment:  Patient Active

## 2024-02-15 NOTE — PROGRESS NOTES
[facility] - Inpatient Rehabilitation Department   Phone: [phone]    Occupational Therapy    [] Initial Evaluation            [x] Daily Treatment Note         [] Discharge Summary      Patient: Rissa Becerra   : 1950   MRN: [id number]   Date of Service:  2/15/2024    Admitting Diagnosis:  Acute CVA (cerebrovascular accident) (HCC)  Current Admission Summary: Per H&P \"73 y.o. female who was brought in by EMS transportation for left leg and left arm weakness along with left-sided facial droop.  Patient was seen on Friday at [facility] for a stroke.  Family arrived and informed me that she had intermittent left-sided facial droop along with slurred speech that started at 3 PM Friday.  She was last seen normal at 10 AM Friday.  Patient was admitted overnight at [place] and was found to have multifocal strokes on the CT of the MRI.  When she was discharged on Saturday the weakness in her left arm and left leg along with a left-sided face worsened.  Family called today because she fell twice due to weakness.  Patient does have left ankle weakness as well.  Stroke alert was called prior to arrival and admit the patient in the hallway.  Patient went right to CT for a scan \"  Past Medical History:  has a past medical history of Arthropathy, Asthma, Bell's palsy, Bronchitis, Diabetes mellitus ([facility]), Diverticulitis, Hyperlipidemia, Hypertension, Migraine, Polyneuropathy in diabetes ([facility]), and Vertigo.  Past Surgical History:  has a past surgical history that includes hernia repair; Carpal tunnel release; Total knee arthroplasty; Rotator cuff repair; and Percutaneous Transluminal Coronary Angio (2023).    Discharge Recommendations: Rissa Becerra scored a 9/ on the AM-PAC ADL Inpatient form. Current research shows that an AM-PAC score of 17 or less is typically not associated with a discharge to the patient's home setting. Based on the patient's AM-PAC score and their current ADL deficits,  events  Safety Judgement: decreased awareness of need for assistance, decreased awareness of need for safety  Problem Solving: assistance required to generate solutions, assistance required to identify errors made, assistance required to correct errors made  Insights: decreased awareness of deficits  Initiation: requires cues for some  Sequencing: requires cues for some  Orientation:    alert and oriented x 4  Command Following:   accurately follows one step commands     Education  Barriers To Learning: cognition  Patient Education: patient educated on goals, OT role and benefits, plan of care, discharge recommendations  Learning Assessment:  patient verbalizes understanding, would benefit from continued reinforcement    Assessment  Activity Tolerance: Fair  Impairments Requiring Therapeutic Intervention: decreased functional mobility, decreased ADL status, decreased ROM, decreased strength, decreased safety awareness, decreased cognition, decreased endurance, decreased sensation, decreased balance, decreased fine motor control, decreased coordination, decreased posture  Prognosis: good and fair  Clinical Assessment: Pt with functional decline this date, not able to transfer to chair with stand or sit pivot due to pushing with RUE, poor midline orientation, deficits in command following and NWB LLE. Maxi move used to transfer pt this date. Pt Dependent x 2 for supine to sit and sit to supine (x3 trials). Pt's sitting balance was Dependent x 2. Pt did complete grooming in recliner at Min A with heavy setup. Pt would greatly benefit from intensive inpt therapy to maximize functional independence, safety and ease caregiver burden.   Safety Interventions: patient left in chair, chair alarm in place, call light within reach, nurse notified, and family/caregiver present    Plan  Frequency: 5-7 x/week  Current Treatment Recommendations: strengthening, ROM, balance training, functional mobility training, transfer training,

## 2024-02-15 NOTE — CARE COORDINATION
Discharge Planning:     (CM) ARU pre-cert is approved and Patient can go to ARU once medically ready per Dante Herzog with ARU.       Electronically signed by ERI Olvera on 2/15/2024 at 8:53 AM

## 2024-02-15 NOTE — PROGRESS NOTES
Facility/Department: [address]  Speech Language Pathology   Dysphagia and Speech Language/Cognitive Treatment Note    Patient: Rissa Becerra   : 1950   MRN: [id number]      Evaluation Date: 2/15/2024      Admitting Dx: Elevated troponin [R79.89]  Acute left-sided weakness [R53.1]  Closed displaced fracture of lateral malleolus of left fibula, initial encounter [S82.62XA]  Acute CVA (cerebrovascular accident) (HCC) [I63.9]  Treatment Diagnosis: Oropharyngeal dysphagia, Cognitive-Linguistic Deficits, Dysarthria   Pain: Denies                                                Subjective:  Pt seen upright in bed, alert and agreeable to participate in therapy session. Multiple family members at bedside. Pt fatigued as session progressed.     Dysphagia Treatment:   Diet and Treatment Recommendations 2/15/2024:  Diet Solids Recommendation:  Dysphagia II Minced and Moist  Liquid Consistency Recommendation:  Mildly (nectar) thick liquids  Recommended form of Meds: Meds crushed as able in puree    -Allow thin water between meals   -Recommend dietician consult as pt and family are interested in nutritional supplements     Compensatory strategies: Alternate solids/liquids, Check for pocketing of food L, Upright as possible with all PO intake, No straws, Assist Feed, Small bites/sips, Lingual Sweeps, Eat/feed slowly, Remain upright 30-45 min, Aspiration Precautions, Oral care following meals as appropriate (if pt is pocketing in L)     Assessment of Texture Tolerance:  Diet level prior to treatment: Dysphagia II Minced and Moist, Mildly (nectar) thick liquids   Tolerance of Current Diet Level: Pt's family reports improved diet tolerance and adequate PO intake.     Impressions: Pt was positioned upright in chair, alert and agreeable. Currently on room air. Trials of ice chips,  thin liquids were provided in conjunction with effortful swallow. Pt was engaged in oral motor exercises for improved labial ROM,

## 2024-02-15 NOTE — PROGRESS NOTES
Rissa Becerra  Neurology Follow-up  [facility] Neurology    Date of Service: 2/15/2024    Subjective:   CC: Follow up today regarding: Recent right MCA ischemic stroke    Events noted. Chart and lab reviewed.  Patient seen this morning, resting in chair.  Several family members at bedside.  We discussed echo results, which was technically difficult but shows EF 55-60%, and no shunting.  Same left-sided weakness.  She denies dizziness, neck or back pain, or visual changes.  Other review of systems unremarkable      ROS : A 10-12 system review obtained and updated today and is unremarkable except as mentioned  in my interval history.     Past medical history, social history, medication and family history reviewed.       Objective:  Exam:   Constitutional:   Vitals:    02/15/24 0445 02/15/24 0502 02/15/24 0730 02/15/24 1133   BP: 102/76  (!) 154/70 105/63   Pulse: 67  72 73   Resp: 18  16 16   Temp: 98.1 °F (36.7 °C)  98.4 °F (36.9 °C) 98.4 °F (36.9 °C)   TempSrc: Axillary  Oral Oral   SpO2: 95%  96% 95%   Weight:  96.3 kg (212 lb 6.4 oz)     Height:         General appearance:  Normal development and appear in no acute distress.   Mental Status:   Oriented to person, place, problem   Memory: Good immediate recall.  Intact remote memory  Good attention span and concentration.  Language: Same dysarthric speech  Good fund of Knowledge.   Cranial Nerves:   II:   Pupils: equal, round, reactive to light  III,IV,VI: Same right gaze preference, no nystagmus  V: Facial sensation is intact  VII: Facial strength and movements: Left asymmetry  XII: Tongue movements are normal  Musculoskeletal: Same left arm weakness, flaccid, 2/5 left lower extremity.  5/5 right side extremities  Tone: Normal tone.   Reflexes: Hyperreflexia left compared to right  Coordination: No abnormal movements, no tremors  Sensation: Decreased to light touch left side  Gait/Posture: Cannot be tested due to patient condition and

## 2024-02-15 NOTE — PROGRESS NOTES
[facility] - Inpatient Rehabilitation Department   Phone: [phone]    Physical Therapy    [] Initial Evaluation            [x] Daily Treatment Note         [] Discharge Summary      Patient: Rissa Becerra   : 1950   MRN: [id number]   Date of Service:  2/15/2024  Admitting Diagnosis: Acute CVA (cerebrovascular accident) (HCC)  Current Admission Summary: This is a 73 y.o. female who was brought in by EMS transportation for left leg and left arm weakness along with left-sided facial droop.  Patient was seen on Friday at [facility] for a stroke.  Family arrived and informed me that she had intermittent left-sided facial droop along with slurred speech that started at 3 PM Friday.  She was last seen normal at 10 AM Friday.  Patient was admitted overnight at [place] and was found to have multifocal strokes on the CT of the MRI.  When she was discharged on Saturday the weakness in her left arm and left leg along with a left-sided face worsened.  Family called today because she fell twice due to weakness.  Patient does have left ankle weakness as well.  Stroke alert was called prior to arrival and admit the patient in the hallway.  Patient went right to CT for a scan   Past Medical History:  has a past medical history of Arthropathy, Asthma, Bell's palsy, Bronchitis, Diabetes mellitus ([facility]), Diverticulitis, Hyperlipidemia, Hypertension, Migraine, Polyneuropathy in diabetes ([facility]), and Vertigo.  Past Surgical History:  has a past surgical history that includes hernia repair; Carpal tunnel release; Total knee arthroplasty; Rotator cuff repair; and Percutaneous Transluminal Coronary Angio (2023).  Discharge Recommendations: Rissa Becerra scored a 6/24 on the AM-PAC short mobility form. Current research shows that an AM-PAC score of 17 or less is typically not associated with a discharge to the patient's home setting. Based on the patient's AM-PAC score and their current functional mobility  role and benefits, plan of care, general safety, discharge recommendations  Learning Assessment:  patient verbalizes understanding, would benefit from continued reinforcement    Assessment  Activity Tolerance: Fair  Impairments Requiring Therapeutic Intervention: decreased functional mobility, decreased ADL status, decreased ROM, decreased strength, decreased cognition, decreased endurance, decreased sensation, decreased balance, decreased fine motor control, decreased coordination, decreased posture  Prognosis: good and fair  Clinical Assessment: Pt with functioning decline today and dependent for all sitting and transfers due to hard pushing of R UE/LE to L side and poor orientation to midline, heavy cotx today due to air mattress on bed and pushing. Pt presents as far below her baseline function secondary to recent CVA. Pt is not appropriate for a discharge to home environment at this time, and requires intense therapy services to promote return to OF.  Safety Interventions: patient left in chair, chair alarm in place, call light within reach, patient at risk for falls, nurse notified, and family/caregiver present - 4 family members present    Plan  Frequency: 5-7 x/week  Current Treatment Recommendations: strengthening, ROM, balance training, functional mobility training, transfer training, gait training, stair training, endurance training, neuromuscular re-education, patient/caregiver education, cognitive reorientation, home exercise program, and safety education    Goals  Patient Goals: To transfer to [facility] [place] [place]   Short Term Goals:  Time Frame: Prior to D/C  Patient will complete bed mobility at maximum assistance   Patient will complete transfers at maximum assistance   Patient will complete manual w/c propulsion 50 ft at contact guard assistance  **No goals met in full this date, 2/15.    Above goals reviewed on 2/15/2024.  All goals are ongoing at this time unless indicated above.      Therapy Session Time

## 2024-02-16 ENCOUNTER — APPOINTMENT (OUTPATIENT)
Dept: GENERAL RADIOLOGY | Age: 74
End: 2024-02-16
Payer: MEDICARE

## 2024-02-16 ENCOUNTER — HOSPITAL ENCOUNTER (INPATIENT)
Age: 74
DRG: 056 | End: 2024-02-16
Attending: STUDENT IN AN ORGANIZED HEALTH CARE EDUCATION/TRAINING PROGRAM | Admitting: STUDENT IN AN ORGANIZED HEALTH CARE EDUCATION/TRAINING PROGRAM
Payer: MEDICARE

## 2024-02-16 VITALS
HEIGHT: 62 IN | TEMPERATURE: 98.4 F | OXYGEN SATURATION: 93 % | HEART RATE: 70 BPM | DIASTOLIC BLOOD PRESSURE: 75 MMHG | BODY MASS INDEX: 36.97 KG/M2 | WEIGHT: 200.9 LBS | SYSTOLIC BLOOD PRESSURE: 130 MMHG | RESPIRATION RATE: 16 BRPM

## 2024-02-16 PROBLEM — I63.9 ACUTE CEREBROVASCULAR ACCIDENT (CVA) DUE TO ISCHEMIA (HCC): Status: ACTIVE | Noted: 2024-02-16

## 2024-02-16 LAB
ALBUMIN SERPL-MCNC: 3.6 G/DL (ref 3.4–5)
ANION GAP SERPL CALCULATED.3IONS-SCNC: 13 MMOL/L (ref 3–16)
BASOPHILS # BLD: 0.1 K/UL (ref 0–0.2)
BASOPHILS NFR BLD: 0.7 %
BUN SERPL-MCNC: 19 MG/DL (ref 7–20)
CALCIUM SERPL-MCNC: 9.1 MG/DL (ref 8.3–10.6)
CHLORIDE SERPL-SCNC: 105 MMOL/L (ref 99–110)
CO2 SERPL-SCNC: 24 MMOL/L (ref 21–32)
CREAT SERPL-MCNC: 0.7 MG/DL (ref 0.6–1.2)
DEPRECATED RDW RBC AUTO: 13.8 % (ref 12.4–15.4)
EOSINOPHIL # BLD: 0.4 K/UL (ref 0–0.6)
EOSINOPHIL NFR BLD: 4.3 %
GFR SERPLBLD CREATININE-BSD FMLA CKD-EPI: >60 ML/MIN/{1.73_M2}
GLUCOSE BLD-MCNC: 133 MG/DL (ref 70–99)
GLUCOSE BLD-MCNC: 168 MG/DL (ref 70–99)
GLUCOSE BLD-MCNC: 199 MG/DL (ref 70–99)
GLUCOSE BLD-MCNC: 240 MG/DL (ref 70–99)
GLUCOSE SERPL-MCNC: 162 MG/DL (ref 70–99)
HCT VFR BLD AUTO: 34 % (ref 36–48)
HGB BLD-MCNC: 11.3 G/DL (ref 12–16)
LYMPHOCYTES # BLD: 2 K/UL (ref 1–5.1)
LYMPHOCYTES NFR BLD: 20.6 %
MAGNESIUM SERPL-MCNC: 1.8 MG/DL (ref 1.8–2.4)
MCH RBC QN AUTO: 30.6 PG (ref 26–34)
MCHC RBC AUTO-ENTMCNC: 33.4 G/DL (ref 31–36)
MCV RBC AUTO: 91.8 FL (ref 80–100)
MONOCYTES # BLD: 1 K/UL (ref 0–1.3)
MONOCYTES NFR BLD: 10.3 %
NEUTROPHILS # BLD: 6.3 K/UL (ref 1.7–7.7)
NEUTROPHILS NFR BLD: 64.1 %
PERFORMED ON: ABNORMAL
PHOSPHATE SERPL-MCNC: 3 MG/DL (ref 2.5–4.9)
PLATELET # BLD AUTO: 302 K/UL (ref 135–450)
PMV BLD AUTO: 6.8 FL (ref 5–10.5)
POTASSIUM SERPL-SCNC: 3.9 MMOL/L (ref 3.5–5.1)
RBC # BLD AUTO: 3.7 M/UL (ref 4–5.2)
SODIUM SERPL-SCNC: 142 MMOL/L (ref 136–145)
WBC # BLD AUTO: 9.8 K/UL (ref 4–11)

## 2024-02-16 PROCEDURE — 83735 ASSAY OF MAGNESIUM: CPT

## 2024-02-16 PROCEDURE — 6370000000 HC RX 637 (ALT 250 FOR IP): Performed by: STUDENT IN AN ORGANIZED HEALTH CARE EDUCATION/TRAINING PROGRAM

## 2024-02-16 PROCEDURE — 94761 N-INVAS EAR/PLS OXIMETRY MLT: CPT

## 2024-02-16 PROCEDURE — 97530 THERAPEUTIC ACTIVITIES: CPT

## 2024-02-16 PROCEDURE — 6370000000 HC RX 637 (ALT 250 FOR IP): Performed by: INTERNAL MEDICINE

## 2024-02-16 PROCEDURE — 6360000002 HC RX W HCPCS: Performed by: INTERNAL MEDICINE

## 2024-02-16 PROCEDURE — 2580000003 HC RX 258: Performed by: INTERNAL MEDICINE

## 2024-02-16 PROCEDURE — 6370000000 HC RX 637 (ALT 250 FOR IP): Performed by: NURSE PRACTITIONER

## 2024-02-16 PROCEDURE — 71045 X-RAY EXAM CHEST 1 VIEW: CPT

## 2024-02-16 PROCEDURE — 36415 COLL VENOUS BLD VENIPUNCTURE: CPT

## 2024-02-16 PROCEDURE — 85025 COMPLETE CBC W/AUTO DIFF WBC: CPT

## 2024-02-16 PROCEDURE — 74018 RADEX ABDOMEN 1 VIEW: CPT

## 2024-02-16 PROCEDURE — 94640 AIRWAY INHALATION TREATMENT: CPT

## 2024-02-16 PROCEDURE — 80069 RENAL FUNCTION PANEL: CPT

## 2024-02-16 PROCEDURE — 1280000000 HC REHAB R&B

## 2024-02-16 RX ORDER — ENOXAPARIN SODIUM 100 MG/ML
40 INJECTION SUBCUTANEOUS DAILY
Status: CANCELLED | OUTPATIENT
Start: 2024-02-16

## 2024-02-16 RX ORDER — CLOPIDOGREL BISULFATE 75 MG/1
75 TABLET ORAL DAILY
Status: CANCELLED | OUTPATIENT
Start: 2024-02-17

## 2024-02-16 RX ORDER — ENEMA 19; 7 G/133ML; G/133ML
1 ENEMA RECTAL DAILY PRN
Status: CANCELLED | OUTPATIENT
Start: 2024-02-16

## 2024-02-16 RX ORDER — CETIRIZINE HYDROCHLORIDE 10 MG/1
10 TABLET ORAL DAILY
Status: DISCONTINUED | OUTPATIENT
Start: 2024-02-17 | End: 2024-03-08 | Stop reason: HOSPADM

## 2024-02-16 RX ORDER — ENEMA 19; 7 G/133ML; G/133ML
1 ENEMA RECTAL DAILY PRN
Status: DISCONTINUED | OUTPATIENT
Start: 2024-02-16 | End: 2024-03-08 | Stop reason: HOSPADM

## 2024-02-16 RX ORDER — POLYETHYLENE GLYCOL 3350 17 G/17G
17 POWDER, FOR SOLUTION ORAL DAILY PRN
Status: DISCONTINUED | OUTPATIENT
Start: 2024-02-16 | End: 2024-03-08 | Stop reason: HOSPADM

## 2024-02-16 RX ORDER — DEXTROSE MONOHYDRATE 100 MG/ML
INJECTION, SOLUTION INTRAVENOUS CONTINUOUS PRN
Status: DISCONTINUED | OUTPATIENT
Start: 2024-02-16 | End: 2024-03-08 | Stop reason: HOSPADM

## 2024-02-16 RX ORDER — ENOXAPARIN SODIUM 100 MG/ML
40 INJECTION SUBCUTANEOUS DAILY
Status: DISCONTINUED | OUTPATIENT
Start: 2024-02-17 | End: 2024-03-08 | Stop reason: HOSPADM

## 2024-02-16 RX ORDER — INSULIN LISPRO 100 [IU]/ML
0-8 INJECTION, SOLUTION INTRAVENOUS; SUBCUTANEOUS
Status: DISCONTINUED | OUTPATIENT
Start: 2024-02-16 | End: 2024-03-08 | Stop reason: HOSPADM

## 2024-02-16 RX ORDER — VITAMIN B COMPLEX
1000 TABLET ORAL DAILY
Status: CANCELLED | OUTPATIENT
Start: 2024-02-17

## 2024-02-16 RX ORDER — INSULIN GLARGINE 100 [IU]/ML
25 INJECTION, SOLUTION SUBCUTANEOUS
Qty: 10 ML | Refills: 0 | Status: ON HOLD
Start: 2024-02-17

## 2024-02-16 RX ORDER — LACTOBACILLUS RHAMNOSUS GG 10B CELL
2 CAPSULE ORAL
Qty: 60 CAPSULE | Refills: 0 | Status: ON HOLD
Start: 2024-02-17

## 2024-02-16 RX ORDER — SPIRONOLACTONE 25 MG/1
12.5 TABLET ORAL DAILY
Status: CANCELLED | OUTPATIENT
Start: 2024-02-17

## 2024-02-16 RX ORDER — ACETAMINOPHEN 325 MG/1
650 TABLET ORAL EVERY 4 HOURS PRN
Status: DISCONTINUED | OUTPATIENT
Start: 2024-02-16 | End: 2024-03-08 | Stop reason: HOSPADM

## 2024-02-16 RX ORDER — FUROSEMIDE 40 MG/1
40 TABLET ORAL DAILY
Status: DISCONTINUED | OUTPATIENT
Start: 2024-02-17 | End: 2024-02-25

## 2024-02-16 RX ORDER — CARVEDILOL 6.25 MG/1
12.5 TABLET ORAL 2 TIMES DAILY WITH MEALS
Status: CANCELLED | OUTPATIENT
Start: 2024-02-16

## 2024-02-16 RX ORDER — IPRATROPIUM BROMIDE AND ALBUTEROL SULFATE 2.5; .5 MG/3ML; MG/3ML
1 SOLUTION RESPIRATORY (INHALATION) EVERY 4 HOURS PRN
Status: DISCONTINUED | OUTPATIENT
Start: 2024-02-16 | End: 2024-03-08 | Stop reason: HOSPADM

## 2024-02-16 RX ORDER — ACETAMINOPHEN 325 MG/1
650 TABLET ORAL EVERY 4 HOURS PRN
Status: CANCELLED | OUTPATIENT
Start: 2024-02-16

## 2024-02-16 RX ORDER — LACTOBACILLUS RHAMNOSUS GG 10B CELL
2 CAPSULE ORAL
Status: CANCELLED | OUTPATIENT
Start: 2024-02-17

## 2024-02-16 RX ORDER — ATORVASTATIN CALCIUM 80 MG/1
80 TABLET, FILM COATED ORAL NIGHTLY
Status: CANCELLED | OUTPATIENT
Start: 2024-02-16

## 2024-02-16 RX ORDER — INSULIN LISPRO 100 [IU]/ML
0-4 INJECTION, SOLUTION INTRAVENOUS; SUBCUTANEOUS NIGHTLY
Status: DISCONTINUED | OUTPATIENT
Start: 2024-02-16 | End: 2024-03-08 | Stop reason: HOSPADM

## 2024-02-16 RX ORDER — AMOXICILLIN AND CLAVULANATE POTASSIUM 875; 125 MG/1; MG/1
1 TABLET, FILM COATED ORAL 2 TIMES DAILY
Status: DISCONTINUED | OUTPATIENT
Start: 2024-02-16 | End: 2024-02-19

## 2024-02-16 RX ORDER — INSULIN GLARGINE 100 [IU]/ML
25 INJECTION, SOLUTION SUBCUTANEOUS
Status: DISCONTINUED | OUTPATIENT
Start: 2024-02-17 | End: 2024-02-16 | Stop reason: HOSPADM

## 2024-02-16 RX ORDER — INSULIN LISPRO 100 [IU]/ML
0-8 INJECTION, SOLUTION INTRAVENOUS; SUBCUTANEOUS
Status: CANCELLED | OUTPATIENT
Start: 2024-02-16

## 2024-02-16 RX ORDER — FUROSEMIDE 40 MG/1
40 TABLET ORAL DAILY
Status: CANCELLED | OUTPATIENT
Start: 2024-02-17

## 2024-02-16 RX ORDER — CARVEDILOL 6.25 MG/1
12.5 TABLET ORAL 2 TIMES DAILY WITH MEALS
Status: DISCONTINUED | OUTPATIENT
Start: 2024-02-17 | End: 2024-03-08 | Stop reason: HOSPADM

## 2024-02-16 RX ORDER — INSULIN LISPRO 100 [IU]/ML
0-4 INJECTION, SOLUTION INTRAVENOUS; SUBCUTANEOUS NIGHTLY
Status: CANCELLED | OUTPATIENT
Start: 2024-02-16

## 2024-02-16 RX ORDER — IPRATROPIUM BROMIDE AND ALBUTEROL SULFATE 2.5; .5 MG/3ML; MG/3ML
3 SOLUTION RESPIRATORY (INHALATION)
Qty: 360 ML | Refills: 0 | Status: ON HOLD
Start: 2024-02-16

## 2024-02-16 RX ORDER — ASPIRIN 81 MG/1
81 TABLET ORAL DAILY
Status: DISCONTINUED | OUTPATIENT
Start: 2024-02-17 | End: 2024-03-08 | Stop reason: HOSPADM

## 2024-02-16 RX ORDER — INSULIN GLARGINE 100 [IU]/ML
25 INJECTION, SOLUTION SUBCUTANEOUS
Status: DISCONTINUED | OUTPATIENT
Start: 2024-02-17 | End: 2024-02-21

## 2024-02-16 RX ORDER — INSULIN GLARGINE 100 [IU]/ML
25 INJECTION, SOLUTION SUBCUTANEOUS
Status: CANCELLED | OUTPATIENT
Start: 2024-02-17

## 2024-02-16 RX ORDER — ONDANSETRON 4 MG/1
4 TABLET, ORALLY DISINTEGRATING ORAL EVERY 8 HOURS PRN
Status: CANCELLED | OUTPATIENT
Start: 2024-02-16

## 2024-02-16 RX ORDER — ONDANSETRON 4 MG/1
4 TABLET, ORALLY DISINTEGRATING ORAL EVERY 8 HOURS PRN
Status: DISCONTINUED | OUTPATIENT
Start: 2024-02-16 | End: 2024-03-08 | Stop reason: HOSPADM

## 2024-02-16 RX ORDER — SPIRONOLACTONE 25 MG/1
12.5 TABLET ORAL DAILY
Status: DISCONTINUED | OUTPATIENT
Start: 2024-02-17 | End: 2024-03-08 | Stop reason: HOSPADM

## 2024-02-16 RX ORDER — GLUCAGON 1 MG/ML
1 KIT INJECTION PRN
Status: CANCELLED | OUTPATIENT
Start: 2024-02-16

## 2024-02-16 RX ORDER — IPRATROPIUM BROMIDE AND ALBUTEROL SULFATE 2.5; .5 MG/3ML; MG/3ML
1 SOLUTION RESPIRATORY (INHALATION)
Status: DISCONTINUED | OUTPATIENT
Start: 2024-02-16 | End: 2024-02-20

## 2024-02-16 RX ORDER — FUROSEMIDE 40 MG/1
40 TABLET ORAL DAILY
Qty: 60 TABLET | Refills: 0 | Status: ON HOLD
Start: 2024-02-17

## 2024-02-16 RX ORDER — IPRATROPIUM BROMIDE AND ALBUTEROL SULFATE 2.5; .5 MG/3ML; MG/3ML
1 SOLUTION RESPIRATORY (INHALATION)
Status: CANCELLED | OUTPATIENT
Start: 2024-02-16

## 2024-02-16 RX ORDER — AMOXICILLIN AND CLAVULANATE POTASSIUM 875; 125 MG/1; MG/1
1 TABLET, FILM COATED ORAL 2 TIMES DAILY
Status: CANCELLED | OUTPATIENT
Start: 2024-02-16

## 2024-02-16 RX ORDER — LACTOBACILLUS RHAMNOSUS GG 10B CELL
2 CAPSULE ORAL
Status: DISCONTINUED | OUTPATIENT
Start: 2024-02-17 | End: 2024-03-08 | Stop reason: HOSPADM

## 2024-02-16 RX ORDER — AMOXICILLIN AND CLAVULANATE POTASSIUM 875; 125 MG/1; MG/1
1 TABLET, FILM COATED ORAL 2 TIMES DAILY
Qty: 20 TABLET | Refills: 0 | Status: ON HOLD
Start: 2024-02-16 | End: 2024-02-26

## 2024-02-16 RX ORDER — VITAMIN B COMPLEX
1000 TABLET ORAL DAILY
Status: DISCONTINUED | OUTPATIENT
Start: 2024-02-17 | End: 2024-03-08 | Stop reason: HOSPADM

## 2024-02-16 RX ORDER — GLUCAGON 1 MG/ML
1 KIT INJECTION PRN
Status: DISCONTINUED | OUTPATIENT
Start: 2024-02-16 | End: 2024-02-16 | Stop reason: RX

## 2024-02-16 RX ORDER — POLYETHYLENE GLYCOL 3350 17 G/17G
17 POWDER, FOR SOLUTION ORAL DAILY PRN
Status: CANCELLED | OUTPATIENT
Start: 2024-02-16

## 2024-02-16 RX ORDER — CETIRIZINE HYDROCHLORIDE 10 MG/1
10 TABLET ORAL DAILY
Status: CANCELLED | OUTPATIENT
Start: 2024-02-17

## 2024-02-16 RX ORDER — CLOPIDOGREL BISULFATE 75 MG/1
75 TABLET ORAL DAILY
Status: DISCONTINUED | OUTPATIENT
Start: 2024-02-17 | End: 2024-03-08 | Stop reason: HOSPADM

## 2024-02-16 RX ORDER — ASPIRIN 81 MG/1
81 TABLET ORAL DAILY
Status: CANCELLED | OUTPATIENT
Start: 2024-02-17

## 2024-02-16 RX ORDER — SPIRONOLACTONE 25 MG/1
12.5 TABLET ORAL DAILY
Qty: 30 TABLET | Refills: 0 | Status: ON HOLD
Start: 2024-02-16

## 2024-02-16 RX ORDER — DEXTROSE MONOHYDRATE 100 MG/ML
INJECTION, SOLUTION INTRAVENOUS CONTINUOUS PRN
Status: CANCELLED | OUTPATIENT
Start: 2024-02-16

## 2024-02-16 RX ORDER — ATORVASTATIN CALCIUM 80 MG/1
80 TABLET, FILM COATED ORAL NIGHTLY
Status: DISCONTINUED | OUTPATIENT
Start: 2024-02-16 | End: 2024-03-08 | Stop reason: HOSPADM

## 2024-02-16 RX ADMIN — ATORVASTATIN CALCIUM 80 MG: 80 TABLET, FILM COATED ORAL at 20:42

## 2024-02-16 RX ADMIN — Medication 10 ML: at 09:00

## 2024-02-16 RX ADMIN — PIPERACILLIN AND TAZOBACTAM 3375 MG: 3; .375 INJECTION, POWDER, FOR SOLUTION INTRAVENOUS at 05:48

## 2024-02-16 RX ADMIN — SACUBITRIL AND VALSARTAN 1 TABLET: 24; 26 TABLET, FILM COATED ORAL at 09:00

## 2024-02-16 RX ADMIN — INSULIN GLARGINE 15 UNITS: 100 INJECTION, SOLUTION SUBCUTANEOUS at 09:00

## 2024-02-16 RX ADMIN — Medication 1000 UNITS: at 09:00

## 2024-02-16 RX ADMIN — INSULIN LISPRO 4 UNITS: 100 INJECTION, SOLUTION INTRAVENOUS; SUBCUTANEOUS at 11:55

## 2024-02-16 RX ADMIN — FUROSEMIDE 40 MG: 40 TABLET ORAL at 09:00

## 2024-02-16 RX ADMIN — IPRATROPIUM BROMIDE AND ALBUTEROL SULFATE 1 DOSE: .5; 3 SOLUTION RESPIRATORY (INHALATION) at 19:56

## 2024-02-16 RX ADMIN — CETIRIZINE HYDROCHLORIDE 10 MG: 10 TABLET, FILM COATED ORAL at 09:00

## 2024-02-16 RX ADMIN — ACETAMINOPHEN 650 MG: 325 TABLET ORAL at 09:00

## 2024-02-16 RX ADMIN — SPIRONOLACTONE 12.5 MG: 25 TABLET ORAL at 09:00

## 2024-02-16 RX ADMIN — CLOPIDOGREL BISULFATE 75 MG: 75 TABLET ORAL at 09:00

## 2024-02-16 RX ADMIN — Medication 2 CAPSULE: at 09:00

## 2024-02-16 RX ADMIN — ASPIRIN 81 MG: 81 TABLET, COATED ORAL at 09:00

## 2024-02-16 RX ADMIN — IPRATROPIUM BROMIDE AND ALBUTEROL SULFATE 1 DOSE: .5; 3 SOLUTION RESPIRATORY (INHALATION) at 07:58

## 2024-02-16 RX ADMIN — ENOXAPARIN SODIUM 40 MG: 100 INJECTION SUBCUTANEOUS at 09:00

## 2024-02-16 RX ADMIN — IPRATROPIUM BROMIDE AND ALBUTEROL SULFATE 1 DOSE: .5; 3 SOLUTION RESPIRATORY (INHALATION) at 15:53

## 2024-02-16 RX ADMIN — AMOXICILLIN AND CLAVULANATE POTASSIUM 1 TABLET: 875; 125 TABLET, FILM COATED ORAL at 20:42

## 2024-02-16 RX ADMIN — SACUBITRIL AND VALSARTAN 1 TABLET: 24; 26 TABLET, FILM COATED ORAL at 20:42

## 2024-02-16 RX ADMIN — ACETAMINOPHEN 650 MG: 325 TABLET ORAL at 16:35

## 2024-02-16 RX ADMIN — PIPERACILLIN AND TAZOBACTAM 3375 MG: 3; .375 INJECTION, POWDER, FOR SOLUTION INTRAVENOUS at 16:35

## 2024-02-16 RX ADMIN — CARVEDILOL 12.5 MG: 6.25 TABLET, FILM COATED ORAL at 09:00

## 2024-02-16 RX ADMIN — CARVEDILOL 12.5 MG: 6.25 TABLET, FILM COATED ORAL at 16:35

## 2024-02-16 NOTE — PROGRESS NOTES
Bladder scanned patient with a reading of 470ml.  Gave patient some time to try to urinate with no success.  Straight cath patient and collected 510ml of yellow clear urine.  New purewick back in place, patient readjusted in bed and safety precautions in place.  Patient voices no needs at this time.

## 2024-02-16 NOTE — PLAN OF CARE
ARU PATIENT TREATMENT PLAN  [facility]  [address] [place] [address]  [phone]      Rissa Becerra    : 1950  [facility] #: [id number]  MRN: [id number]  PHYSICIAN:  Ottoniel Greene MD  Primary Problem    Patient Active Problem List   Diagnosis    Localized edema    Diabetic polyneuropathy (HCC)    Acute CVA (cerebrovascular accident) (HCC)    Left hemiplegia (HCC)    Dysphagia    CAD (coronary artery disease)    DM2 (diabetes mellitus, type 2) (HCC)    HTN (hypertension), benign    Obesity (BMI 30-39.9)    Acute left-sided weakness    Arterial ischemic stroke, ICA, right, acute (HCC)    DM (diabetes mellitus), secondary, with complications ([facility])    Dyslipidemia    Closed displaced fracture of lateral malleolus of left fibula    Acute cerebrovascular accident (CVA) due to ischemia ([facility])       Rehabilitation Diagnosis:    Acute R MCA territory ischemic stroke     Assessment and Plan:  #. Acute R MCA territory ischemic stroke  - thromoembolic vs cardioembolic  - MRI Brain w/ acute R MCA infarct  - Echo with LVEF 55-60%, indeterminate diastolic dysfunction, RV dilation, negative bubble study  - ASA, clopidigrel, statin  - cardiac event monior x 30 days     #. Oropharyngeal Dysphagia  - SLP   - ADULT DIET; Dysphagia - Minced and Moist; Mildly Thick (Nectar); No Drinking Straws  ADULT ORAL NUTRITION SUPPLEMENT; Breakfast, Lunch, Dinner; Diabetic Oral Supplement     #. Chronic combine systolic/diastolic heart failure  - BB, Entresto, spironolactone, Lasix  - daily weights     #. Aspiration PNA  - initially managed with IV Zosyn, will complete additional 10 days of PO Augmentin  - Duonebs TID     #. DM2, last A1c  6.1%, insulin dependent  - Lantus 25 units daily w/ breakfast  - med intensity SSI TID ac + hs  - resume home metformin when able    ADMIT DATE:2024    Patient Goals: ***  Admitting Impairments: ***  Activities: ***  Participation: Prior to admission patient was living

## 2024-02-16 NOTE — PLAN OF CARE
Problem: Discharge Planning  Goal: Discharge to home or other facility with appropriate resources  2/15/2024 2223 by Barber Metz, RN  Outcome: Progressing  2/15/2024 1506 by Baldomero Harrington RN  Outcome: Progressing     Problem: Pain  Goal: Verbalizes/displays adequate comfort level or baseline comfort level  2/15/2024 2223 by Barber Metz, RN  Outcome: Progressing  2/15/2024 1506 by Baldomero Harrington RN  Outcome: Progressing

## 2024-02-16 NOTE — CARE COORDINATION
Case Management -  Discharge Note      Patient Name: Rissa Becerra                   YOB: 1950            Readmission Risk (Low < 19, Mod (19-27), High > 27): Readmission Risk Score: 15.1    Current PCP: Stephanie Oneill MD    ([facility]) Important Message from Medicare:    Date: 2/16/24    PT AM-PAC: 6 /24  OT AM-PAC: 9 /24    Patient/patient representative has been educated on the benefits of ARU as well as the possible risks of declining recommended services. Patient/patient representative has acknowledged the information provided and decided on the following discharge plan. Patient/ patient representative has been provided freedom of choice regarding service provider, supported by basic dialogue that supports the patient's individualized plan of care/goals.    Patient noted to have a discharge order.  Pt has been medically cleared for transition to Acute Rehab Facility    Patient discharged to   [facility] - Acute Rehabilitation Unit  [address]  [place] [id number]  Phone: [phone]           Pre-cert required/obtained:  yes    Family Notified:  yes      Financial    Payor: [employer] MEDICARE / Plan: [employer] GOLD PLUS HMO / Product Type: *No Product type* /     Pharmacy:  Potential assistance Purchasing Medications: No  Meds-to-Beds request:        [id number] - [place], [place] - [address] [phone] - F [phone]  [address]  [facility] [address]  Phone: [phone] Fax: [phone]    Electronically signed by Jessica Cheema RN on 2/16/2024 at 1:19 PM

## 2024-02-16 NOTE — PLAN OF CARE
Problem: Discharge Planning  Goal: Discharge to home or other facility with appropriate resources  2/16/2024 1135 by Baldomero Harrington, RN  Outcome: Progressing     Problem: Pain  Goal: Verbalizes/displays adequate comfort level or baseline comfort level  2/16/2024 1135 by Baldomero Harrington, RN  Outcome: Progressing     Problem: Skin/Tissue Integrity  Goal: Absence of new skin breakdown  Description: 1.  Monitor for areas of redness and/or skin breakdown  2.  Assess vascular access sites hourly  3.  Every 4-6 hours minimum:  Change oxygen saturation probe site  4.  Every 4-6 hours:  If on nasal continuous positive airway pressure, respiratory therapy assess nares and determine need for appliance change or resting period.  Outcome: Progressing     Problem: Safety - Adult  Goal: Free from fall injury  Outcome: Progressing     Problem: ABCDS Injury Assessment  Goal: Absence of physical injury  Outcome: Progressing

## 2024-02-16 NOTE — CARE COORDINATION
02/16/24 1317   IMM Letter   IMM Letter given to Patient/Family/Significant other/Guardian/POA/by: Reviewed with pt & Family, signed & copy provided   IMM Letter date given: 02/16/24   IMM Letter time given: 1312

## 2024-02-16 NOTE — DISCHARGE INSTRUCTIONS
Your information:  Name: Rissa Becerra  : 1950    Your Discharge Instructions    What to do after you leave the hospital:    Read, review and familiarize yourself with the information provided below and in a separate packet on   Stroke    Diet: Minced & Moist; Nectar Thick; Thin liquids between meals    Recommended activity:   As tolerated  Avoid strenuous activity until instructed by your physician to resume; balance rest with periods of light to normal activity.     If you experience any of the following: Unusual or inadequately controlled pain; unusual transient shortness of breath; recurrent or persistent nausea, heartburn, palpitations or lightheadedness; increased swelling; increased fatigue; fever >100; please follow up with your PCP or go to the Emergency Room.      Home Health/ Outpatient Services: N/A      Information obtained by:  By signing below, I understand and acknowledge receipt of the instructions indicated above, and I understand that if any problems occur once I leave the hospital I am to contact your PCP.

## 2024-02-16 NOTE — PROGRESS NOTES
ARU Admission Assessment    Ethnicity  \"Are you of , /a, or Somali origin?\"  Check all that apply:  [x] A.  No, not of , /a, or Somali Origin  [] B.  Yes, Prydeinig, Prydeinig American, Chicano/a  [] C.  Yes, Scottish  [] D.  Yes, Jarad  [] E.  Yes, another , , or Somali origin  [] X.  Patient unable to respond  [] Y.  Patient declines to respond    Race  \"What is your race?\"  Check all that apply:  [x] A.  White  [] B.  Black or   [] C.   or   [] D.   Danish  [] E.  Chinese  [] F.  Montenegrin  [] G. Ugandan  [] H.  Kazakh  [] I.  Sinhala  [] J.  Other   [] K.    [] L.  Turkmen or Alice  [] M.  Thai  [] N.  Other   [] X.  Patient unable to respond  [] Y.  Patient declines to respond  [] Z.  None of the above    Language  A.  \"What is your preferred language?\"   English    B.  \"Do you need or want an  to communicate with a doctor or health care staff?\"  Check only one:  [x] 0.  No  [] 1.  Yes  [] 9.  Unable to determine    Transportation  \"Has lack of transportation kept you from medical appointments, meetings, work, or from getting things needed for daily living?\"Check all that apply:  [] A.  Yes, it has kept me from medical appointments or from getting my medications  [] B.  Yes, it has kept me from non-medical meetings, appointments, work, or from getting things that I need  [x] C.  No  [] X.  Patient unable to respond  [] Y.  Patient declines to respond    Hearing  Ability to hear (with hearing aid or hearing appliances if normally used)  [x]  0.  Adequate - no difficulty in normal conversation, social interaction, listening to TV  []  1.  Minimal difficulty - difficulty in some environments (e.g. when person speaks softly or setting is noisy)  []  2.  Moderate difficulty - speaker has to increase volume and speak distinctly   []  3.  Highly impaired - absence of  reviewed and updated as necessary, and are accurate for the admission assessment period.    Assessing/Reviewing RN: DELGADO LeggettN, RN.    Assessing/Reviewing RN: Tomi العلي

## 2024-02-16 NOTE — PROGRESS NOTES
Patient was admitted to room 4905 at 1800.  Patient was oriented to the Call Light, Phone, TV, Thermostat, Bed Controls, Bathroom and Emergency Cord.  Patient verbalized and demonstrated understanding of all.  Patient was also given an overview of Unit Routines for Acute Rehab, including the patient's rights and responsibilities as well as the CMS IRF [place] Privacy Act Statement providing notice of data collection.  Patient states that their normal bowel regime is everyday. Meal times were explained, including how to order food.  The white board, (which is posted on the wall by the door is used for communication) has the Therapy Scheduled that is posted each day along with the name of your doctor, nurse, and therapist for your convenience.  We recommend any family that will be care givers or any care givers the patient has, take part in therapy.  We have no set visiting hours, we suggest non-caregiver friends and family visitors come after therapy (at 4 PM or later) to allow patient to rest in between sessions.      In conjunction with the patient and patient’s family, this nurse worked to establish a tailored Fall TIPS plan to ensure patient safety and compliance:    Falls TIPS Completion    Patient identified as increased risk for harm if fall:  [] Yes     Fall Risks  History of Falls:    [x] Yes   Medication Side Effects:   [] Yes   Walking Aid:    [x] Yes   IV Pole or Equipment:   [x] Yes   Unsteady Walk:     [x] Yes   May Forget or Choose Not to Call: [] Yes     Fall Interventions   Communicate Recent Fall and/or Risk of Harm: [] Yes   Walking Aids:  Crutches: [] Yes   Cane: [] Yes   Walker: [x] Yes   IV Assistance When Walking: [x] Yes   Toileting Schedule: Every 2 Hours  Bedpan:   [] Yes   Assist to Commode: [] Yes   Assist to Bathroom: [] Yes   Bed Alarm On: [x] Yes   Assistance Out of Bed:  Bedrest: [] Yes   1 Person: [] Yes   2 People: [x] Yes

## 2024-02-16 NOTE — DISCHARGE SUMMARY
Hospital Medicine Discharge Summary    Patient: Rissa Becerra     Gender: female  : 1950   Age: 73 y.o.  MRN: [id number]    Admitting Physician: Itz Miller MD  Discharge Physician: Itz Miller MD     Code Status: DNR-CCA     Admit Date: 2024   Discharge Date:   24    [facility]    Discharge Diagnoses:    Active Hospital Problems    Diagnosis Date Noted    Acute left-sided weakness [R53.1] 2024    Arterial ischemic stroke, ICA, right, acute (HCC) [I63.231] 2024    DM (diabetes mellitus), secondary, with complications (HCC) [E13.8] 2024    Dyslipidemia [E78.5] 2024    Closed displaced fracture of lateral malleolus of left fibula [S82.62XA] 2024    Acute CVA (cerebrovascular accident) (HCC) [I63.9] 2024    Left hemiplegia (HCC) [G81.94] 2024    Dysphagia [R13.10] 2024    CAD (coronary artery disease) [I25.10] 2024    DM2 (diabetes mellitus, type 2) (HCC) [E11.9] 2024    HTN (hypertension), benign [I10] 2024    Obesity (BMI 30-39.9) [E66.9] 2024    Diabetic polyneuropathy (HCC) [E11.42] 2022       Follow-up appointments:  one week    Outpatient to do list: F/U with PCP, Neuro and Ortho    Condition at Discharge:  Stable    Hospital Course:   73 y.o. female with PMH of T2DM, CAD, Systolic HF who was admitted with acute CVA     Acute CVA  Continue ASA, Plavix and statin  Neuro consulted  Event monitor as OP  Echo:  Left ventricular cavity size is normal with mild hypertrophy of the posterior wall. Ejection fraction is visually estimated to be 55-60%. Difficult to determine wall motion due to abnormal arrhythmia. Indeterminate  diastolic function. E/e' = 12.6.  A bubble study was performed, but is difficult to see; however, it doesn't  appear to show evidence of shunting.  Individual aortic valve leaflets are not clearly visualized. Mitral annular calcification is present. RVSP = 29 mmHG. IVC  stress view please to eval syndesmosis TECHNOLOGIST PROVIDED HISTORY: Reason for exam:->1 view; stress view please to eval syndesmosis Reason for Exam: Fall/bruising FINDINGS: Overlying fiberglass splint posteriorly.  Oblique fracture of the distal fibula.  Slight asymmetry of the joint space.  Osteochondral lesion along the medial talar dome shoulder.  Small calcaneal heel spur.     Interval posterior splinting, the fibular fracture appears to be in good alignment and positioning.     MRI BRAIN WO CONTRAST    Result Date: 2/12/2024  EXAMINATION: MRI OF THE BRAIN WITHOUT CONTRAST  2/12/2024 3:12 pm TECHNIQUE: Multiplanar multisequence MRI of the brain was performed without the administration of intravenous contrast. COMPARISON: None. HISTORY: ORDERING SYSTEM PROVIDED HISTORY: Flaccid left arm and leg, left facial weakness, wake up protocol TECHNOLOGIST PROVIDED HISTORY: Reason for exam:->Flaccid left arm and leg, left facial weakness, wake up protocol Decision Support Exception - unselect if not a suspected or confirmed emergency medical condition->Emergency Medical Condition (MA) Reason for Exam: Was dx'd 2/9/24 with a stroke affecting left side, is returning here today with increased weakness, worsening symptoms, left foot fracture and headache.  Still difficult for patient to verbalize what she wants to say. Initial evaluation. FINDINGS: INTRACRANIAL STRUCTURES/VENTRICLES: Scattered areas of acute infarct are seen within the right MCA territory.  There is no significant mass effect or midline shift.  No evidence of an acute intracranial hemorrhage.  Areas of T2 FLAIR hyperintensity are seen in the periventricular and subcortical white matter, which are nonspecific, but may represent chronic microvascular ischemic change.  There is prominence of the ventricles and sulci due to global parenchymal volume loss.  The signal voids within the major intracranial vessels appear grossly preserved. ORBITS: The visualized

## 2024-02-16 NOTE — PROGRESS NOTES
Speech Language Pathology  HOLD   [facility]   1950     Attempted to see pt for dysphagia/speech therapy. RN placing rubio catheter at this time. Pt is then scheduled to leave the floor for imaging. Plans are for d/c to ARU later this date. Will attempt to see as therapy schedule allows.    Thanks,  Malini Salas MA CCC-SLP #[id number]  Speech Language Pathologist

## 2024-02-16 NOTE — PROGRESS NOTES
it is recommended that the patient have 5-7 sessions per week of Occupational Therapy at d/c to increase the patient's independence.  At this time, this patient demonstrates complex nursing, medical, and rehabilitative needs, and would benefit from intensive rehabilitation services upon discharge from the Inpatient setting.  This patient demonstrates the ability to participate in and benefit from an intensive therapy program with a coordinated interdisciplinary team approach to foster frequent, structured, and documented communication among disciplines, who will work together to establish, prioritize, and achieve treatment goals. Please see assessment section for further patient specific details.    If patient discharges prior to next session this note will serve as a discharge summary.  Please see below for the latest assessment towards goals.      DME Required For Discharge: DME to be determined pending patient progress    Precautions/Restrictions: high fall risk, weight bearing  Weight Bearing Restrictions: non weight bearing LEFT mildly displaced distal fibula fracture   [] Right Upper Extremity  [] Left Upper Extremity [] Right Lower Extremity  [x] Left Lower Extremity     Required Braces/Orthotics:  Short leg splint   [] Right  [] Left  Positional Restrictions:no positional restrictions    Pre-Admission Information   Lives With: spouse    Type of Home: house  Home Layout: one level  Home Access:  2 step to enter without rails   Bathroom Layout: tub/shower unit  Bathroom Equipment: grab bars in shower, hand held shower head  Toilet Height: standard height  Home Equipment: rolling walker, single point cane  Transfer Assistance: modified independent with use of SPC  Ambulation Assistance:modified independent with use of SPC  ADL Assistance: independent with all ADL's  IADL Assistance: independent with homemaking tasks  Active :        [x] Yes  [] No  Hand Dominance: [] Left  [x] Right  Current Employment:  retired.  Occupation: stay at home mother  Hobbies:   Recent Falls: Pt reports 6 falls since last admission.     Examination   Vision:   Vision Gross Assessment: Impaired and Vision Corrective Device: wears glasses for reading Decreased visual tracking to L side   Hearing:   WFL  Perception:   Overall Perceptual Status: Impaired on Left  Unilateral Attention: cues to attend (L) visual field, cues to attend (L) side of body, cues to maintain midline in sitting  Initiation: cues to initiate tasks  Motor Planning: hand over hand to sequence tasks   Observation:   General Observation:  on RA, L facial droop, short leg splint and ace bandaged LLE, bruising on LUE and L knee which family reports is from recent fall  Posture:   Poor, L lateral lean,   Sensation:   reports numbness and tingling in (B) LE and reduced light touch to (L) UE, (L) LE neuropathy in feet  Proprioception:    diminished proprioception in (L) UE, (L) LE  Tone:   Hypotonic in (L) UE, (L) LE   Coordination Testing:   Coordination and Movement Description: (L) UE, (L) LE, fine motor impairments, gross motor impairments    ROM:   No AROM noted in LUE, PROM Elbow flexion/extension WFL. RUE WFL  Strength:   No trace contraction noted on LUE shoulder, elbow, or wrist this date    Therapist Clinical Decision Making (Complexity): high complexity  Clinical Presentation: evolving      Subjective: Pt supine in bed with multiple family members at bedside. Pt is pleasant with sense of humor.  General: Pt's  and family very active in directing pt's care.  Pain: Patient does not rate upon questioning - reports abdominal pain    Pain Interventions: pain medication in place prior to arrival, RN notified, repositioned , and therapy activities modified    Activities of Daily Living    Basic Activities of Daily Living  General Comments: no ADL completed today   Instrumental Activities of Daily Living  No IADL completed on this date.    Functional Mobility    Bed

## 2024-02-16 NOTE — PROGRESS NOTES
4 Eyes Skin Assessment     NAME:  Rissa Becerra  YOB: 1950  MEDICAL RECORD NUMBER:  [id number]    The patient is being assessed for  Admission    I agree that at least one RN has performed a thorough Head to Toe Skin Assessment on the patient. ALL assessment sites listed below have been assessed.      Areas assessed by both nurses:    Head, Face, Ears, Shoulders, Back, Chest, Arms, Elbows, Hands, Sacrum. Buttock, Coccyx, Ischium, Legs. Feet and Heels, and Under Medical Devices         Does the Patient have a Wound? Yes wound(s) were present on assessment. LDA wound assessment was Initiated and completed by RN, skin tears on left elbow, left knee and scab on right elbow, scattering bruising.       Wily Prevention initiated by RN: Yes  Wound Care Orders initiated by RN: No    Pressure Injury (Stage 3,4, Unstageable, DTI, NWPT, and Complex wounds) if present, place Wound referral order by RN under : No    New Ostomies, if present place, Ostomy referral order under : No     Nurse 1 eSignature: Electronically signed by Dilip Pope RN on 2/16/24 at 6:45 PM EST    **SHARE this note so that the co-signing nurse can place an eSignature**    Nurse 2 eSignature: Electronically signed by Briana Anderson RN on 2/16/24 at 6:46 PM EST

## 2024-02-16 NOTE — PROGRESS NOTES
[facility] - Inpatient Rehabilitation Department   Phone: [phone]    Physical Therapy    [] Initial Evaluation            [x] Daily Treatment Note         [] Discharge Summary      Patient: Rissa Becerra   : 1950   MRN: [id number]   Date of Service:  2024  Admitting Diagnosis: Acute CVA (cerebrovascular accident) (HCC)  Current Admission Summary: This is a 73 y.o. female who was brought in by EMS transportation for left leg and left arm weakness along with left-sided facial droop.  Patient was seen on Friday at [facility] for a stroke.  Family arrived and informed me that she had intermittent left-sided facial droop along with slurred speech that started at 3 PM Friday.  She was last seen normal at 10 AM Friday.  Patient was admitted overnight at [place] and was found to have multifocal strokes on the CT of the MRI.  When she was discharged on Saturday the weakness in her left arm and left leg along with a left-sided face worsened.  Family called today because she fell twice due to weakness.  Patient does have left ankle weakness as well.  Stroke alert was called prior to arrival and admit the patient in the hallway.  Patient went right to CT for a scan   Past Medical History:  has a past medical history of Arthropathy, Asthma, Bell's palsy, Bronchitis, Diabetes mellitus ([facility]), Diverticulitis, Hyperlipidemia, Hypertension, Migraine, Polyneuropathy in diabetes ([facility]), and Vertigo.  Past Surgical History:  has a past surgical history that includes hernia repair; Carpal tunnel release; Total knee arthroplasty; Rotator cuff repair; and Percutaneous Transluminal Coronary Angio (2023).  Discharge Recommendations: Rissa Becerra scored a 6/24 on the AM-PAC short mobility form. Current research shows that an AM-PAC score of 17 or less is typically not associated with a discharge to the patient's home setting. Based on the patient's AM-PAC score and their current functional mobility  (-): requires max (A) to maintain balance  Comments: dependent sitting balance due to hard pushing to L side with R UE    Other Therapeutic Interventions    Mild ROM completed to encourage neck rotation to the (L), touch of (L) UE with (R) UE. Patient tolerates sitting EOB x 8 minutes total while engaging in (R) arm propping and reaching within base of support.     Functional Outcomes  AM-PAC Inpatient Mobility Raw Score : 6              Cognition  Overall Cognitive Status: Impaired  Arousal/Alertness: appropriate responses to stimuli  Following Commands: follows one step commands with repetition  Attention Span: attends with cues to redirect  Memory: decreased recall of recent events  Safety Judgement: good awareness of safety precautions  Problem Solving: assistance required to generate solutions, decreased awareness of errors  Insights: decreased awareness of deficits  Initiation: requires cues for all  Sequencing: requires cues for all  Orientation:    alert and oriented x 4  Command Following:   accurately follows one step commands    Education  Barriers To Learning: cognition  Patient Education: patient educated on goals, PT role and benefits, plan of care, general safety, discharge recommendations  Learning Assessment:  patient verbalizes understanding, would benefit from continued reinforcement    Assessment  Activity Tolerance: Fair  Impairments Requiring Therapeutic Intervention: decreased functional mobility, decreased ADL status, decreased ROM, decreased strength, decreased cognition, decreased endurance, decreased sensation, decreased balance, decreased fine motor control, decreased coordination, decreased posture  Prognosis: good and fair  Clinical Assessment: Pt with functioning decline today and dependent for all sitting and transfers due to hard pushing of R UE/LE to L side and poor orientation to midline. Patient responds well to tactile and external cuing for base of support. Pt presents as far

## 2024-02-17 LAB
ALBUMIN SERPL-MCNC: 3.3 G/DL (ref 3.4–5)
ALBUMIN/GLOB SERPL: 0.9 {RATIO} (ref 1.1–2.2)
ALP SERPL-CCNC: 73 U/L (ref 40–129)
ALT SERPL-CCNC: 24 U/L (ref 10–40)
ANION GAP SERPL CALCULATED.3IONS-SCNC: 14 MMOL/L (ref 3–16)
AST SERPL-CCNC: 23 U/L (ref 15–37)
BILIRUB SERPL-MCNC: 0.8 MG/DL (ref 0–1)
BUN SERPL-MCNC: 16 MG/DL (ref 7–20)
CALCIUM SERPL-MCNC: 9.2 MG/DL (ref 8.3–10.6)
CHLORIDE SERPL-SCNC: 104 MMOL/L (ref 99–110)
CO2 SERPL-SCNC: 21 MMOL/L (ref 21–32)
CREAT SERPL-MCNC: 0.6 MG/DL (ref 0.6–1.2)
GFR SERPLBLD CREATININE-BSD FMLA CKD-EPI: >60 ML/MIN/{1.73_M2}
GLUCOSE BLD-MCNC: 158 MG/DL (ref 70–99)
GLUCOSE BLD-MCNC: 172 MG/DL (ref 70–99)
GLUCOSE BLD-MCNC: 204 MG/DL (ref 70–99)
GLUCOSE BLD-MCNC: 231 MG/DL (ref 70–99)
GLUCOSE BLD-MCNC: 243 MG/DL (ref 70–99)
GLUCOSE SERPL-MCNC: 171 MG/DL (ref 70–99)
PERFORMED ON: ABNORMAL
POTASSIUM SERPL-SCNC: 4.1 MMOL/L (ref 3.5–5.1)
PROT SERPL-MCNC: 7.1 G/DL (ref 6.4–8.2)
SODIUM SERPL-SCNC: 139 MMOL/L (ref 136–145)

## 2024-02-17 PROCEDURE — 80053 COMPREHEN METABOLIC PANEL: CPT

## 2024-02-17 PROCEDURE — 2580000003 HC RX 258: Performed by: STUDENT IN AN ORGANIZED HEALTH CARE EDUCATION/TRAINING PROGRAM

## 2024-02-17 PROCEDURE — 94640 AIRWAY INHALATION TREATMENT: CPT

## 2024-02-17 PROCEDURE — 92610 EVALUATE SWALLOWING FUNCTION: CPT

## 2024-02-17 PROCEDURE — 6360000002 HC RX W HCPCS: Performed by: STUDENT IN AN ORGANIZED HEALTH CARE EDUCATION/TRAINING PROGRAM

## 2024-02-17 PROCEDURE — 92526 ORAL FUNCTION THERAPY: CPT

## 2024-02-17 PROCEDURE — 83036 HEMOGLOBIN GLYCOSYLATED A1C: CPT

## 2024-02-17 PROCEDURE — 97530 THERAPEUTIC ACTIVITIES: CPT

## 2024-02-17 PROCEDURE — 6370000000 HC RX 637 (ALT 250 FOR IP): Performed by: STUDENT IN AN ORGANIZED HEALTH CARE EDUCATION/TRAINING PROGRAM

## 2024-02-17 PROCEDURE — 97163 PT EVAL HIGH COMPLEX 45 MIN: CPT

## 2024-02-17 PROCEDURE — 97535 SELF CARE MNGMENT TRAINING: CPT

## 2024-02-17 PROCEDURE — 97167 OT EVAL HIGH COMPLEX 60 MIN: CPT

## 2024-02-17 PROCEDURE — 94761 N-INVAS EAR/PLS OXIMETRY MLT: CPT

## 2024-02-17 PROCEDURE — 36415 COLL VENOUS BLD VENIPUNCTURE: CPT

## 2024-02-17 PROCEDURE — 92523 SPEECH SOUND LANG COMPREHEN: CPT

## 2024-02-17 PROCEDURE — 1280000000 HC REHAB R&B

## 2024-02-17 RX ORDER — GLUCOSAM/CHONDRO/HERB 149/HYAL 750-100 MG
2 TABLET ORAL DAILY
COMMUNITY

## 2024-02-17 RX ORDER — SODIUM CHLORIDE 0.9 % (FLUSH) 0.9 %
5-40 SYRINGE (ML) INJECTION 2 TIMES DAILY
Status: DISCONTINUED | OUTPATIENT
Start: 2024-02-17 | End: 2024-02-23

## 2024-02-17 RX ORDER — M-VIT,TX,IRON,MINS/CALC/FOLIC 27MG-0.4MG
1 TABLET ORAL DAILY
COMMUNITY

## 2024-02-17 RX ORDER — DULAGLUTIDE 0.75 MG/.5ML
0.75 INJECTION, SOLUTION SUBCUTANEOUS WEEKLY
COMMUNITY

## 2024-02-17 RX ADMIN — AMOXICILLIN AND CLAVULANATE POTASSIUM 1 TABLET: 875; 125 TABLET, FILM COATED ORAL at 20:41

## 2024-02-17 RX ADMIN — CHOLECALCIFEROL TAB 25 MCG (1000 UNIT) 1000 UNITS: 25 TAB at 09:35

## 2024-02-17 RX ADMIN — CLOPIDOGREL 75 MG: 75 TABLET, FILM COATED ORAL at 09:35

## 2024-02-17 RX ADMIN — Medication 10 ML: at 20:42

## 2024-02-17 RX ADMIN — INSULIN GLARGINE 25 UNITS: 100 INJECTION, SOLUTION SUBCUTANEOUS at 09:42

## 2024-02-17 RX ADMIN — IPRATROPIUM BROMIDE AND ALBUTEROL SULFATE 1 DOSE: .5; 3 SOLUTION RESPIRATORY (INHALATION) at 05:40

## 2024-02-17 RX ADMIN — INSULIN LISPRO 2 UNITS: 100 INJECTION, SOLUTION INTRAVENOUS; SUBCUTANEOUS at 12:32

## 2024-02-17 RX ADMIN — CETIRIZINE HYDROCHLORIDE 10 MG: 10 TABLET, FILM COATED ORAL at 09:35

## 2024-02-17 RX ADMIN — CARVEDILOL 12.5 MG: 6.25 TABLET, FILM COATED ORAL at 17:40

## 2024-02-17 RX ADMIN — ACETAMINOPHEN 650 MG: 325 TABLET ORAL at 12:36

## 2024-02-17 RX ADMIN — CARVEDILOL 12.5 MG: 6.25 TABLET, FILM COATED ORAL at 09:35

## 2024-02-17 RX ADMIN — SACUBITRIL AND VALSARTAN 1 TABLET: 24; 26 TABLET, FILM COATED ORAL at 09:35

## 2024-02-17 RX ADMIN — IPRATROPIUM BROMIDE AND ALBUTEROL SULFATE 1 DOSE: .5; 3 SOLUTION RESPIRATORY (INHALATION) at 15:04

## 2024-02-17 RX ADMIN — ENOXAPARIN SODIUM 40 MG: 100 INJECTION SUBCUTANEOUS at 09:41

## 2024-02-17 RX ADMIN — Medication 2 CAPSULE: at 09:35

## 2024-02-17 RX ADMIN — FUROSEMIDE 40 MG: 40 TABLET ORAL at 09:35

## 2024-02-17 RX ADMIN — Medication 1 TABLET: at 09:34

## 2024-02-17 RX ADMIN — ASPIRIN 81 MG: 81 TABLET, COATED ORAL at 09:35

## 2024-02-17 RX ADMIN — SPIRONOLACTONE 12.5 MG: 25 TABLET ORAL at 09:35

## 2024-02-17 RX ADMIN — SACUBITRIL AND VALSARTAN 1 TABLET: 24; 26 TABLET, FILM COATED ORAL at 20:41

## 2024-02-17 RX ADMIN — AMOXICILLIN AND CLAVULANATE POTASSIUM 1 TABLET: 875; 125 TABLET, FILM COATED ORAL at 09:34

## 2024-02-17 RX ADMIN — ATORVASTATIN CALCIUM 80 MG: 80 TABLET, FILM COATED ORAL at 20:41

## 2024-02-17 ASSESSMENT — PAIN DESCRIPTION - ORIENTATION
ORIENTATION_2: LOWER
ORIENTATION: LEFT
ORIENTATION: LEFT
ORIENTATION_3: LEFT

## 2024-02-17 ASSESSMENT — PAIN SCALES - GENERAL
PAINLEVEL_OUTOF10: 4

## 2024-02-17 ASSESSMENT — PAIN DESCRIPTION - INTENSITY
RATING_2: 4
RATING_3: 4
RATING_2: 4
RATING_3: 4
RATING_2: 4
RATING_2: 4
RATING_3: 4
RATING_2: 4
RATING_2: 4
RATING_3: 4
RATING_3: 4
RATING_2: 4
RATING_2: 4
RATING_3: 4

## 2024-02-17 ASSESSMENT — PAIN DESCRIPTION - LOCATION
LOCATION_3: ARM
LOCATION: ANKLE
LOCATION_2: BACK

## 2024-02-17 ASSESSMENT — PAIN DESCRIPTION - ONSET: ONSET: ON-GOING

## 2024-02-17 ASSESSMENT — PAIN - FUNCTIONAL ASSESSMENT: PAIN_FUNCTIONAL_ASSESSMENT: ACTIVITIES ARE NOT PREVENTED

## 2024-02-17 ASSESSMENT — PAIN DESCRIPTION - FREQUENCY: FREQUENCY: CONTINUOUS

## 2024-02-17 ASSESSMENT — PAIN DESCRIPTION - DESCRIPTORS
DESCRIPTORS: ACHING
DESCRIPTORS: ACHING

## 2024-02-17 NOTE — PROGRESS NOTES
[facility] - Inpatient Rehabilitation Department   Phone: [phone]    Speech Therapy    [x] Initial Evaluation            [] Daily Treatment Note         [] Discharge Summary      Patient: Rissa Becerra   : 1950   MRN: [id number]   Date of Service:  2024  Admitting Diagnosis: Acute cerebrovascular accident (CVA) due to ischemia (HCC)  Current Admission Summary: Rissa Becerra is a 73 y.o. female with history of DM 2, CAD, chronic combined CHF, HTN, recent diagnosis of stroke at [facility] a few days came to ER with complaints of worsening L sided weakness and falls.  Patient has slurred speech and L facial droop at [facility].  She was discharged to home with home care.  Family is very supportive.  Noted she had progressing weakness and falls.  Today, noted to have flaccid LUE/LLE.  Brought to ER for evaluation.  No CP, SOB, HA or dizziness.  Patient is awake and answering questions.  , daughters and son are at bedside with her today.  Otherwise complete ROS is negative unless listed above.   Past Medical History:  has a past medical history of Arthropathy, Asthma, Bell's palsy, Bronchitis, Diabetes mellitus (HCC), Diverticulitis, Hyperlipidemia, Hypertension, Migraine, Polyneuropathy in diabetes ([facility]), and Vertigo.  Past Surgical History:  has a past surgical history that includes hernia repair; Carpal tunnel release; Total knee arthroplasty; Rotator cuff repair; and Percutaneous Transluminal Coronary Angio (2023).  Recent Chest xray: Decreased perihilar airspace disease on the right   Recent MRI Brain: 1. Scattered areas of acute infarct within the right MCA territory.  No  significant mass effect or midline shift.  2. Otherwise, no acute intracranial abnormality.  3. Mild to moderate global parenchymal volume loss with mild chronic  microvascular ischemic changes.  Instrumental Swallow Study: Modified Barium Swallow evaluation completed on 2024.Patient  and functional visual task completion with incorporation of compensatory strategies, to mild cues      Therapy Session Time      Session 1 Session 2   Time In 0800    Time Out 0900    Time Code Minutes 0    Individual Minutes 60      Timed Code Treatment Minutes: 0   Total Treatment Minutes:  60    Electronically Signed By: Sierra Clay, SLP

## 2024-02-17 NOTE — PLAN OF CARE
Problem: Discharge Planning  Goal: Discharge to home or other facility with appropriate resources  Outcome: Progressing     Problem: Safety - Adult  Goal: Free from fall injury  Outcome: Progressing     Problem: Skin/Tissue Integrity  Goal: Absence of new skin breakdown  Outcome: Progressing     Problem: ABCDS Injury Assessment  Goal: Absence of physical injury  Outcome: Progressing

## 2024-02-17 NOTE — CONSULTS
3/14/2023    Patient: Dulce Piedra. YOB: 1957   Date of Visit: 3/14/2023     Arnie Osorio MD  [facility] [address]  [address] [facility] [address]  Via Fax: [phone]    Dear Arnie Osorio MD,      Thank you for referring Mr. Philip Hurtado to [facility] for evaluation. My notes for this consultation are attached. If you have questions, please do not hesitate to call me. I look forward to following your patient along with you.       Sincerely,    FINN Mckeon Nutrition Note    RECOMMENDATIONS  PO Diet: Per SLP  ONS: Offer nectar thick chocolate Glucerna TID  Nutrition Support: None      NUTRITION ASSESSMENT   Consult received for new admission to ARU. Spoke with pt and daughter at bedside. Pt with fair PO intake, consuming 26-50% of meals. Pt's daughter attributes this to pt's dislike of hospital foods and modified textures. Pt states she would eat better if she had foods she liked. Pt dislikes hospital yogurt because only available flavor is vanilla. Likes Activia. Pt prefers mocha flavored Premier Protein shakes. This RD told pt's daughter that it is fine to bring in both of these items for pt as long as nursing thickens the protein shakes to nectar thick. Appetite and PO intake were good prior to admission and weight has been stable. Will monitor for improved PO intake and tolerance to supplement.     Nutrition Related Findings: No edema noted. LBM 2/17.  Wounds: None  Nutrition Education:  Education not indicated   Nutrition Goals: PO intake 50% or greater, prior to discharge     MALNUTRITION ASSESSMENT   Malnutrition Status: No malnutrition    NUTRITION DIAGNOSIS   Inadequate protein-energy intake related to inadequate protein-energy intake as evidenced by intake 0-25%, intake 26-50%    CURRENT NUTRITION THERAPIES  ADULT DIET; Dysphagia - Minced and Moist; Mildly Thick (Nectar); No Drinking Straws  ADULT ORAL NUTRITION SUPPLEMENT; Breakfast, Lunch, Dinner; Standard High Calorie/High Protein Oral Supplement     PO Intake: 1-25%, 26-50%   PO Supplement Intake:Unable to assess    ANTHROPOMETRICS  Current Height: 157.5 cm (5' 2\")  Current Weight - Scale: 89.2 kg (196 lb 9.6 oz)    Ideal Body Weight (IBW): 110 lbs  (50 kg)        BMI: 36    COMPARATIVE STANDARDS  Total Energy Requirements (kcals/day): 9867-0692     Protein (g):   grams       Fluid (mL/day):  1602 mL    The patient will be monitored per nutrition standards of care. Consult dietitian if additional

## 2024-02-17 NOTE — PROGRESS NOTES
Patient was admitted to room 4905 at 1830.  Patient was oriented to the Call Light, Phone, TV, Thermostat, Bed Controls, Bathroom and Emergency Cord.  Patient verbalized and demonstrated understanding of all.  Patient was also given an overview of Unit Routines for Acute Rehab, including the patient's rights and responsibilities as well as the CMS IRF [place] Privacy Act Statement providing notice of data collection.  Patient states that their normal bowel regime is regular. Meal times were explained, including how to order food.  The white board, (which is posted on the wall by the door is used for communication) has the Therapy Scheduled that is posted each day along with the name of your doctor, nurse, and therapist for your convenience.  We recommend any family that will be care givers or any care givers the patient has, take part in therapy.  We have no set visiting hours, we suggest non-caregiver friends and family visitors come after therapy (at 4 PM or later) to allow patient to rest in between sessions.      In conjunction with the patient and patient’s family, this nurse worked to establish a tailored Fall TIPS plan to ensure patient safety and compliance:    Falls TIPS Completion    Patient identified as increased risk for harm if fall:  [] Yes     Fall Risks  History of Falls:    [x] Yes   Medication Side Effects:   [x] Yes   Walking Aid:    [] Yes   IV Pole or Equipment:   [x] Yes   Unsteady Walk:     [] Yes   May Forget or Choose Not to Call: [] Yes     Fall Interventions   Communicate Recent Fall and/or Risk of Harm: [x] Yes   Walking Aids:  Crutches: [] Yes   Cane: [] Yes   Walker: [] Yes   IV Assistance When Walking: [] Yes   Toileting Schedule: Every 1-2 Hours  Bedpan:   [] Yes   Assist to Commode: [x] Yes   Assist to Bathroom: [] Yes   Bed Alarm On: [] Yes   Assistance Out of Bed:  Bedrest: [] Yes   1 Person: [] Yes   2 People: [x] Yes

## 2024-02-17 NOTE — PROGRESS NOTES
[facility] - Inpatient Rehabilitation Department   Phone: [phone]    Occupational Therapy    [x] Initial Evaluation            [] Daily Treatment Note         [] Discharge Summary      Patient: Rissa Becerra   : 1950   MRN: [id number]   Date of Service:  2024    Admitting Diagnosis:  Acute cerebrovascular accident (CVA) due to ischemia (HCC)  Current Admission Summary: Rissa Becerra is a 73 y.o. female with PMHx notable for HTN, HLD, type 2 diabetes mellitus, CHF, recent R MCA stroke (seen at OSH, started on DAPT and discharged home), who presented on 24 with worsening facial droop, left visual field loss, left sided weakness. She was ambulating with device, with minimal left sided weakness at time of prior hospital discharge, gradually developed worsening weakness resulting in several falls, including one causing L ankle fracture. She returned [facility] ED. CT Head showed R frontal hypoattenuation. CTA Head/Neck showed L PCA occlusion, R M2 and M3 segmental narrowing. MRI Brain showed R MCA territory infarct. Echo with LVEF 55-60%, negative bubble study. Neurology was consulted, recommending DAPT, statin, cardiac event monitor. Orthopedics Concomitant injuries included: L ankle fracture, for which Ortho was consulted, recommending NWB LLE and non-op management in the context of recent stroke. Hospital course complicated by: aspiration pneumonia, L shoulder pain (CT L shoulder negative for fracture), urinary retention.      Patient reports that she is doing well today. She denies any fevers, chills, chest pain, shortness of breath. Endorses stable L visual field deficits, dysarthria, dysphagia, left sided weakness. Endorses left ankle pain. Endorses urinary retention, requiring straight cath since Kilgore catheter discontinued.      Past Medical History:  has a past medical history of Arthropathy, Asthma, Bell's palsy, Bronchitis, Diabetes mellitus (HCC), Diverticulitis,  stimuli  Following Commands: follows one step commands consistently  Attention Span: attends with cues to redirect  Insights: decreased awareness of deficits  Initiation: requires cues for some  Sequencing: requires cues for some  Comments: will continue to assess  Orientation:    oriented to person  Command Following:   accurately follows one step commands     Education  Barriers To Learning: cognition, language, and visual  Patient Education: patient educated on goals, OT role and benefits, plan of care, precautions, ADL adaptive strategies, family education, disease specific education, transfer training, ARU therapy expectations  Learning Assessment:  patient verbalizes understanding, would benefit from continued reinforcement    Assessment  Activity Tolerance: Fair however significantly limited hemiplegia and visual deficits  Impairments Requiring Therapeutic Intervention: decreased functional mobility, decreased ADL status, decreased ROM, decreased strength, decreased safety awareness, decreased cognition, decreased endurance, decreased sensation, decreased balance, decreased vision, decreased IADL, decreased fine motor control, decreased coordination, vestibular impairment, decreased posture  Prognosis: guarded  Clinical Assessment: The patient is a 73 y.o. female who presents at [facility] below their baseline level of function due to above deficits following a fall d/t worsening L sided weakness and deficits. Per MRI, pt w/ a R MCA territory infarct  Now, pt is NWB d/t L ankle fracture. Typically, pt is mod I w/ SPC for mobility, transfers, and ADLs. Currently, pt requires max A/dependent for all ADLs, bed mobility, and transfers. Pt limited by L hemiplegia, L visual neglect (vs. Possible L field cut), impaired balance, impaired gross motor coordination, and deficits in cognition. Skilled OT warranted to improve safety and independence in occupational pursuits in order to promote return to OF    Safety  Interventions: patient left in chair, chair alarm in place, call light within reach, patient at risk for falls, nurse notified, family/caregiver present, and RN at bedside   Significant time/education provided to RN staff and family for positioning to improve midline posture.     Plan  Frequency: 5 x/week, 60 min/day  Current Treatment Recommendations: strengthening, ROM, balance training, functional mobility training, transfer training, endurance training, neuromuscular re-education, wheelchair mobility training, modalities, patient/caregiver education, ADL/self-care training, IADL training, pain management, home exercise program, safety education, equipment evaluation/education, and positioning  Goals  Patient Goals: go to the bathroom on the toilet   Short Term Goals:  Time Frame: two weeks  Patient will complete toileting at maximum assistance   Patient will complete functional transfers at maximum assistance   Patient will increase functional sitting balance to CGA for improved ADL completion  Patient will complete bed mobility at minimal assistance   Patient will attend to L visual field with min verbal cues during functional activity.  Long Term Goals:  Time Frame: three weeks  Patient will complete upper body ADL at minimal assistance   Patient will complete lower body ADL at minimal assistance   Patient will complete toileting at minimal assistance   Patient will complete functional transfers at minimal assistance   Patient will complete functional mobility at stand by assistance, in LRAD      Above goals reviewed on 2/17/2024.  All goals are ongoing at this time unless indicated above.       Therapy Session Time     Individual Group Co-treatment   Time In     915   Time Out     1015   Minutes     60        Timed Code Treatment Minutes:   45    Total Treatment Minutes:  60       Electronically Signed By: BEVERLY Hsu, YANNI/L, [id number] 2/17/2024 1:37 PM

## 2024-02-17 NOTE — RT PROTOCOL NOTE
RT Nebulizer Bronchodilator Protocol Note    There is a bronchodilator order in the chart from a provider indicating to follow the RT Bronchodilator Protocol and there is an “Initiate RT Bronchodilator Protocol” order as well (see protocol at bottom of note).    CXR Findings:  XR CHEST PORTABLE    Result Date: 2/16/2024  Decreased perihilar airspace disease on the right       The findings from the last RT Protocol Assessment were as follows:  Smoking: Chronic pulmonary disease  Respiratory Pattern: Regular pattern and RR 12-20 bpm  Breath Sounds: Slightly diminished and/or crackles  Cough: Weak, non-productive  Indication for Bronchodilator Therapy:    Bronchodilator Assessment Score: 7    Aerosolized bronchodilator medication orders have been revised according to the RT Nebulizer Bronchodilator Protocol below.    Respiratory Therapist to perform RT Therapy Protocol Assessment initially then follow the protocol.  Repeat RT Therapy Protocol Assessment PRN for score 0-3 or on second treatment, BID, and PRN for scores above 3.    No Indications - adjust the frequency to every 6 hours PRN wheezing or bronchospasm, if no treatments needed after 48 hours then discontinue using Per Protocol order mode.     If indication present, adjust the RT bronchodilator orders based on the Bronchodilator Assessment Score as indicated below.  If a patient is on this medication at home then do not decrease Frequency below that used at home.    0-3 - enter or revise RT bronchodilator order(s) to equivalent RT Bronchodilator order with Frequency of every 4 hours PRN for wheezing or increased work of breathing using Per Protocol order mode.       4-6 - enter or revise RT Bronchodilator order(s) to two equivalent RT bronchodilator orders with one order with BID Frequency and one order with Frequency of every 4 hours PRN wheezing or increased work of breathing using Per Protocol order mode.         7-10 - enter or revise RT Bronchodilator

## 2024-02-17 NOTE — PROGRESS NOTES
Pt arrived to the room via bed. AXOX4. Family members at the bedside. All safety measures in place. The care plan and education has been reviewed and mutually agreed upon with the patient.

## 2024-02-17 NOTE — PROGRESS NOTES
Admission Period/Goal QM Codes for Rissa Becerra.    QM Admit Code Goal Code   Eating     Oral Hygiene     Toileting Hygiene     Shower/Bathing     UB Dressing     LB Dressing     Putting on/off Footwear     Rolling Left and Right     Sit To Lying     Lying to Sitting on Bedside     Sit to Stand     Chair/Bed to Chair Transfer     Toilet Transfers     Car Transfers     Walk 10 Feet     Walk 50 Feet with Two Turns     Walk 150 Feet     Walk 10 Feet on Uneven Surfaces     1 Step (Curb)     4 Steps     12 Steps     Picking up Object from Floor     Wheel 50 Feet with 2 Turns     Type     Wheel 150 Feet     Type         The above codes were determined by the treatment team to be the patient's accurate admission assessment codes based on assessment performed soon after the patient's admission and prior to the benefit of services provided by staff, or if appropriate, the patient's usual performance at admission.    OT:       PT:       RN:       ST:       :

## 2024-02-17 NOTE — H&P
Patient: Rissa Becerra  [id number]  Date: 2/17/2024      Chief Complaint: Acute R MCA territory ischemic stroke    History of Present Illness/Hospital Course:  Rissa Becerra is a 73 y.o. female with PMHx notable for HTN, HLD, type 2 diabetes mellitus, CHF, recent R MCA stroke (seen at OSH, started on DAPT and discharged home), who presented on 2/12/24 with worsening facial droop, left visual field loss, left sided weakness. She was ambulating with device, with minimal left sided weakness at time of prior hospital discharge, gradually developed worsening weakness resulting in several falls, including one causing L ankle fracture. She returned to [facility] ED. CT Head showed R frontal hypoattenuation. CTA Head/Neck showed L PCA occlusion, R M2 and M3 segmental narrowing. MRI Brain showed R MCA territory infarct. Echo with LVEF 55-60%, negative bubble study. Neurology was consulted, recommending DAPT, statin, cardiac event monitor. Orthopedics Concomitant injuries included: L ankle fracture, for which Ortho was consulted, recommending NWB LLE and non-op management in the context of recent stroke. Hospital course complicated by: aspiration pneumonia, L shoulder pain (CT L shoulder negative for fracture), urinary retention.     Patient reports that she is doing well today. She denies any fevers, chills, chest pain, shortness of breath. Endorses stable L visual field deficits, dysarthria, dysphagia, left sided weakness. Endorses left ankle pain. Endorses urinary retention, requiring straight cath since Kilgore catheter discontinued.     Prior Level of Function:  Mod-I for mobility with straight cane, Ind for ADLs and IADLs.  Lives with spouse in one level house with 2 NIKO.     Current Level of Function:  PT  Bed Mobility:  Supine to Sit: 2 person assistance with dependent x 2   Sit to Supine: 2 person assistance with dependent x 2   Rolling Left: 2 person assistance with max (A) x 2   Rolling Right: 2 person assistance  02/17/2024    ALKPHOS 73 02/17/2024    BILITOT 0.8 02/17/2024       Most recent echocardiogram revealed   2/12 MRI Brain w/o contrast  IMPRESSION:  1. Scattered areas of acute infarct within the right MCA territory.  No  significant mass effect or midline shift.  2. Otherwise, no acute intracranial abnormality.  3. Mild to moderate global parenchymal volume loss with mild chronic  microvascular ischemic changes.    Most recent imaging studies revealed    Technically difficult study due to body habitus and immobility. Definity was   administered.   Left ventricular cavity size is normal with mild hypertrophy of the   posterior wall. Ejection fraction is visually estimated to be 55-60%.   Difficult to determine wall motion due to abnormal arrhythmia. Indeterminate   diastolic function. E/e' = 12.6.   A bubble study was performed, but is difficult to see; however, it doesn't   appear to show evidence of shunting.   Individual aortic valve leaflets are not clearly visualized.   Mitral annular calcification is present.   RVSP = 29 mmHG.   IVC size is normal (<2.1 cm) but collapses < 50% with respiration consistent   with elevated RA pressure (8 mmHg).   Right ventricle is not well visualized, but appears mildly dilated with   normal function. TAPSE: 2.51 cm. RVS velcoity: 18.6 cm/s.    The above laboratory data have been reviewed.     The above imaging data have been reviewed.     The above medical testing have been reviewed.     Body mass index is 35.96 kg/m².    Barriers to Discharge (AKA Impairments): Left homonymous hemianopsia, left visual/tactile neglect, dysarthria, dysphagia, left hemiparesis, NWB LLE limiting independence with mobility and self-care.     Disposition: Home    Prognosis: Guarded    Rehabilitation goals: To return patient to home setting at their prior level of function.     This patient's IRF admission is reasonable and necessary to optimize their medical status, maximize their functional

## 2024-02-17 NOTE — PROGRESS NOTES
Pt has had low grade temp x2   - 99 degrees oral at 1236 treated with prn Tylenol  - 99 degrees axillary.      Pt repositioned. Graciela care completed. No depends on to allow skin to be open to air. Smear BM noted and cleaned.        Latest Reference Range & Units 02/15/24 13:30   Color, UA Straw/Yellow  Yellow   Clarity, UA Clear  Clear   Glucose, UA Negative mg/dL Negative   Bilirubin, Urine Negative  Negative   Ketones, Urine Negative mg/dL Negative   Specific Gravity, UA 1.005 - 1.030  1.010   Blood, Urine Negative  TRACE !   pH, UA 5.0 - 8.0  6.0   Protein, UA Negative mg/dL Negative   Urobilinogen, Urine <2.0 E.U./dL 1.0   Nitrite, Urine Negative  Negative   Leukocyte Esterase, Urine Negative  Negative   Urine Type  Voided   Urinalysis Comments  see below   Urine Reflex to Culture  Not Indicated   !: Data is abnormal    __________________________________________________________________     Latest Reference Range & Units 02/16/24 05:25   WBC 4.0 - 11.0 K/uL 9.8   RBC 4.00 - 5.20 M/uL 3.70 (L)   Hemoglobin Quant 12.0 - 16.0 g/dL 11.3 (L)   Hematocrit 36.0 - 48.0 % 34.0 (L)   MCV 80.0 - 100.0 fL 91.8   MCH 26.0 - 34.0 pg 30.6   MCHC 31.0 - 36.0 g/dL 33.4   MPV 5.0 - 10.5 fL 6.8   RDW 12.4 - 15.4 % 13.8   Platelet Count 135 - 450 K/uL 302   Neutrophils % % 64.1   Lymphocyte % % 20.6   Monocytes % % 10.3   Eosinophils % % 4.3   Basophils % % 0.7   Neutrophils Absolute 1.7 - 7.7 K/uL 6.3   Lymphocytes Absolute 1.0 - 5.1 K/uL 2.0   Monocytes Absolute 0.0 - 1.3 K/uL 1.0   Eosinophils Absolute 0.0 - 0.6 K/uL 0.4   Basophils Absolute 0.0 - 0.2 K/uL 0.1   (L): Data is abnormally low___    _____________________________________________________________________    Portable CXR 2/16/2024 at 1225:    EXAMINATION:  ONE XRAY VIEW OF THE CHEST     2/16/2024 12:25 pm     COMPARISON:  02/12/2024     HISTORY:  ORDERING SYSTEM PROVIDED HISTORY: Fever  TECHNOLOGIST PROVIDED HISTORY:  Reason for exam:->Fever  Reason for Exam: fever

## 2024-02-17 NOTE — PROGRESS NOTES
02/17/24 1214   RT Protocol   History Pulmonary Disease 2   Respiratory pattern 0   Breath sounds 2   Cough 3   Bronchodilator Assessment Score 7

## 2024-02-17 NOTE — PROGRESS NOTES
Physical Therapy    [facility] - Inpatient Rehabilitation Department   Phone: [phone]    Physical Therapy    [x] Initial Evaluation            [] Daily Treatment Note         [] Discharge Summary      Patient: Rissa Becerra   : 1950   MRN: [id number]   Date of Service:  2024  Admitting Diagnosis: Acute cerebrovascular accident (CVA) due to ischemia (HCC)  Current Admission Summary:  Rissa Becerra is a 73 y.o. female with PMHx notable for HTN, HLD, type 2 diabetes mellitus, CHF, recent R MCA stroke (seen at OSH, started on DAPT and discharged home), who presented on 24 with worsening facial droop, left visual field loss, left sided weakness. She was ambulating with device, with minimal left sided weakness at time of prior hospital discharge, gradually developed worsening weakness resulting in several falls, including one causing L ankle fracture. She returned to [facility] ED. CT Head showed R frontal hypoattenuation. CTA Head/Neck showed L PCA occlusion, R M2 and M3 segmental narrowing. MRI Brain showed R MCA territory infarct. Neurology was consulted, recommending DAPT, statin, cardiac event monitor. Orthopedics Concomitant injuries included: L ankle fracture, for which Ortho was consulted, recommending NWB LLE and non-op management in the context of recent stroke. Hospital course complicated by: aspiration pneumonia.      Currently patient reports left visual field deficit, dysarthria, dysphagia, dense left hemiparesis, left hemisensory loss.   Past Medical History:  has a past medical history of Arthropathy, Asthma, Bell's palsy, Bronchitis, Diabetes mellitus ([facility]), Diverticulitis, Hyperlipidemia, Hypertension, Migraine, Polyneuropathy in diabetes (HCC), and Vertigo.  Past Surgical History:  has a past surgical history that includes hernia repair; Carpal tunnel release; Total knee arthroplasty; Rotator cuff repair; and Percutaneous Transluminal Coronary Angio  Interventions  Pt. Sat at the EOB with Max to dependent assist.  PT/OT facilitated better positioning with 2 pillows on L to support flaccid L UE, turning R UE in supine intermittently to decrease the pushing, blocking R knee initially to keep Pt. From sliding hips off bed.  Intermittently SB to R on R forearm to decrease pushing and assist in achieving the midline positioning.  Once positioned, tray placed in front of Pt., OT assisted with feeding, see note.   Decreased attention on the L and field cut present with breakfast tray, food moved to at least midline for Pt. To engage.  Cueing intermittently to Look Left at ,  Can turn head to L with cuing.    Once sitting in BS chair, PT/OT positioned in sitting with L UE supported with pillows, propping appropriately to decrease pushing and place head in midline as initially head turned to the right.  Discussed goals and objectives of therapy with Pt. And family.  Discussed with RN staff to use the Maxi yoseph for all transfers  Functional Outcomes                 Cognition  Comments: See Speech eval for full assessment but followed commands appropriately.  Easily distracted and needs redirection.    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 L field cut  Patient Education: patient educated on goals, PT role and benefits, plan of care, precautions, weight-bearing education, general safety, functional mobility training, family education, disease specific education, transfer training, discharge recommendations  Learning Assessment:  patient verbalizes understanding, would benefit from continued reinforcement    Assessment  Activity Tolerance: Tolerated well  Impairments Requiring Therapeutic Intervention: decreased functional mobility, decreased ROM, decreased strength, decreased safety awareness, decreased cognition, decreased endurance, decreased sensation,  decreased balance, decreased vision, decreased coordination, decreased posture  Prognosis: fair  Clinical Assessment: Pt. Presents with the above deficits from R MCA CVA complicated by falls and L ankle fx as well as developing pusher syndrome.  Pt. Is signficantly below baseline level of function.  Pt. Has a supportive family and is motivated.  Pt. Will benefit from skilled PT to improve functional mobility and optimize independence.  Safety Interventions: patient left in chair, chair alarm in place, call light within reach, patient at risk for falls, nurse notified, and family/caregiver present    Plan  Frequency: 5 x/week, 60 min/day  Current Treatment Recommendations: strengthening, ROM, balance training, functional mobility training, transfer training, stair training, endurance training, neuromuscular re-education, wheelchair mobility training, patient/caregiver education, cognitive/perceptual training, home exercise program, safety education, and positioning    Goals  Patient Goals: Get self to the bathroom   Short Term Goals:  Time Frame: 1 week  Patient will complete bed mobility at moderate assistance   Patient will complete transfers at 2 person assistance with Mod A    Patient will complete manual w/c propulsion 25 ft at moderate assistance       Long Term Goals:  Time Frame: 3 weeks  Patient will complete bed mobility at minimal assistance   Patient will complete transfers at moderate assistance   Patient will complete car transfer at moderate assistance  Patient will complete manual w/c propulsion 150 ft at supervision     Above goals reviewed on 2/17/2024.  All goals are ongoing at this time unless indicated above.      Therapy Session Time      Individual Group Co-treatment   Time In     0915   Time Out     1015   Minutes     60     Timed Code Treatment Minutes:  45 Minutes  Total Treatment Minutes:  60       Electronically Signed By: NOE FARAH, PT, [id number]

## 2024-02-18 ENCOUNTER — APPOINTMENT (OUTPATIENT)
Dept: CT IMAGING | Age: 74
DRG: 056 | End: 2024-02-18
Attending: STUDENT IN AN ORGANIZED HEALTH CARE EDUCATION/TRAINING PROGRAM
Payer: MEDICARE

## 2024-02-18 VITALS
WEIGHT: 198 LBS | TEMPERATURE: 98.1 F | RESPIRATION RATE: 16 BRPM | DIASTOLIC BLOOD PRESSURE: 81 MMHG | BODY MASS INDEX: 36.44 KG/M2 | OXYGEN SATURATION: 94 % | HEART RATE: 70 BPM | SYSTOLIC BLOOD PRESSURE: 128 MMHG | HEIGHT: 62 IN

## 2024-02-18 LAB
EST. AVERAGE GLUCOSE BLD GHB EST-MCNC: 128.4 MG/DL
GLUCOSE BLD-MCNC: 170 MG/DL (ref 70–99)
GLUCOSE BLD-MCNC: 187 MG/DL (ref 70–99)
GLUCOSE BLD-MCNC: 218 MG/DL (ref 70–99)
GLUCOSE BLD-MCNC: 241 MG/DL (ref 70–99)
HBA1C MFR BLD: 6.1 %
PERFORMED ON: ABNORMAL

## 2024-02-18 PROCEDURE — 94761 N-INVAS EAR/PLS OXIMETRY MLT: CPT

## 2024-02-18 PROCEDURE — 6360000002 HC RX W HCPCS: Performed by: STUDENT IN AN ORGANIZED HEALTH CARE EDUCATION/TRAINING PROGRAM

## 2024-02-18 PROCEDURE — 6370000000 HC RX 637 (ALT 250 FOR IP): Performed by: STUDENT IN AN ORGANIZED HEALTH CARE EDUCATION/TRAINING PROGRAM

## 2024-02-18 PROCEDURE — 2580000003 HC RX 258: Performed by: STUDENT IN AN ORGANIZED HEALTH CARE EDUCATION/TRAINING PROGRAM

## 2024-02-18 PROCEDURE — 94640 AIRWAY INHALATION TREATMENT: CPT

## 2024-02-18 PROCEDURE — 74177 CT ABD & PELVIS W/CONTRAST: CPT

## 2024-02-18 PROCEDURE — 6360000004 HC RX CONTRAST MEDICATION: Performed by: STUDENT IN AN ORGANIZED HEALTH CARE EDUCATION/TRAINING PROGRAM

## 2024-02-18 PROCEDURE — 1280000000 HC REHAB R&B

## 2024-02-18 RX ADMIN — Medication 10 ML: at 20:49

## 2024-02-18 RX ADMIN — SACUBITRIL AND VALSARTAN 1 TABLET: 24; 26 TABLET, FILM COATED ORAL at 09:26

## 2024-02-18 RX ADMIN — INSULIN LISPRO 2 UNITS: 100 INJECTION, SOLUTION INTRAVENOUS; SUBCUTANEOUS at 16:30

## 2024-02-18 RX ADMIN — CARVEDILOL 12.5 MG: 6.25 TABLET, FILM COATED ORAL at 16:32

## 2024-02-18 RX ADMIN — AMOXICILLIN AND CLAVULANATE POTASSIUM 1 TABLET: 875; 125 TABLET, FILM COATED ORAL at 20:47

## 2024-02-18 RX ADMIN — ENOXAPARIN SODIUM 40 MG: 100 INJECTION SUBCUTANEOUS at 09:27

## 2024-02-18 RX ADMIN — ASPIRIN 81 MG: 81 TABLET, COATED ORAL at 09:26

## 2024-02-18 RX ADMIN — CHOLECALCIFEROL TAB 25 MCG (1000 UNIT) 1000 UNITS: 25 TAB at 09:26

## 2024-02-18 RX ADMIN — Medication 10 ML: at 09:57

## 2024-02-18 RX ADMIN — FUROSEMIDE 40 MG: 40 TABLET ORAL at 09:26

## 2024-02-18 RX ADMIN — ACETAMINOPHEN 650 MG: 325 TABLET ORAL at 16:31

## 2024-02-18 RX ADMIN — CLOPIDOGREL 75 MG: 75 TABLET, FILM COATED ORAL at 09:52

## 2024-02-18 RX ADMIN — AMOXICILLIN AND CLAVULANATE POTASSIUM 1 TABLET: 875; 125 TABLET, FILM COATED ORAL at 09:26

## 2024-02-18 RX ADMIN — ATORVASTATIN CALCIUM 80 MG: 80 TABLET, FILM COATED ORAL at 20:47

## 2024-02-18 RX ADMIN — SACUBITRIL AND VALSARTAN 1 TABLET: 24; 26 TABLET, FILM COATED ORAL at 20:47

## 2024-02-18 RX ADMIN — CARVEDILOL 12.5 MG: 6.25 TABLET, FILM COATED ORAL at 09:25

## 2024-02-18 RX ADMIN — SPIRONOLACTONE 12.5 MG: 25 TABLET ORAL at 09:26

## 2024-02-18 RX ADMIN — CETIRIZINE HYDROCHLORIDE 10 MG: 10 TABLET, FILM COATED ORAL at 09:26

## 2024-02-18 RX ADMIN — IPRATROPIUM BROMIDE AND ALBUTEROL SULFATE 1 DOSE: .5; 3 SOLUTION RESPIRATORY (INHALATION) at 05:45

## 2024-02-18 RX ADMIN — IOPAMIDOL 75 ML: 755 INJECTION, SOLUTION INTRAVENOUS at 10:53

## 2024-02-18 RX ADMIN — Medication 2 CAPSULE: at 09:25

## 2024-02-18 RX ADMIN — Medication 1 TABLET: at 09:25

## 2024-02-18 RX ADMIN — INSULIN GLARGINE 25 UNITS: 100 INJECTION, SOLUTION SUBCUTANEOUS at 09:26

## 2024-02-18 ASSESSMENT — PAIN DESCRIPTION - INTENSITY
RATING_2: 4
RATING_3: 0
RATING_3: 5
RATING_3: 5
RATING_2: 0
RATING_3: 5
RATING_3: 5
RATING_2: 4
RATING_3: 5
RATING_2: 4

## 2024-02-18 ASSESSMENT — PAIN DESCRIPTION - LOCATION
LOCATION: BACK
LOCATION_3: ANKLE
LOCATION_2: ARM

## 2024-02-18 ASSESSMENT — PAIN SCALES - GENERAL
PAINLEVEL_OUTOF10: 3
PAINLEVEL_OUTOF10: 0
PAINLEVEL_OUTOF10: 3

## 2024-02-18 ASSESSMENT — PAIN DESCRIPTION - ORIENTATION
ORIENTATION: LOWER
ORIENTATION_3: LEFT
ORIENTATION_2: LEFT

## 2024-02-18 ASSESSMENT — PAIN - FUNCTIONAL ASSESSMENT
PAIN_FUNCTIONAL_ASSESSMENT: PREVENTS OR INTERFERES SOME ACTIVE ACTIVITIES AND ADLS
PAIN_FUNCTIONAL_ASSESSMENT_SITE2: PREVENTS OR INTERFERES SOME ACTIVE ACTIVITIES AND ADLS

## 2024-02-18 ASSESSMENT — PAIN DESCRIPTION - DESCRIPTORS
DESCRIPTORS: SORE
DESCRIPTORS_3: SORE
DESCRIPTORS_2: SORE

## 2024-02-18 ASSESSMENT — PAIN DESCRIPTION - ONSET: ONSET: ON-GOING

## 2024-02-18 ASSESSMENT — PAIN DESCRIPTION - FREQUENCY: FREQUENCY: CONTINUOUS

## 2024-02-18 NOTE — PROGRESS NOTES
Pt returned to unit from CT. VSS. Pt's family at bedside. Bed in lowest position. Call light in reach.     Rocio NATARAJANN, RN   [phone]

## 2024-02-18 NOTE — PROGRESS NOTES
Transport placed to take pt to CT. Lift pad under pt. Notified CT staff of karel Portillo.     Rocio NATARAJANN, RN   [phone]

## 2024-02-18 NOTE — PROGRESS NOTES
CT abdomen/pelvis with IV contrast 2/18/2024 at 1055:    IMPRESSION:  1. No acute intra-abdominal or pelvic process.  2. Large broad-based hernia sac of the abdominal wall containing loops of  bowel and no inflammatory change.  3. Postsurgical changes of the anterior abdominal wall with a 5.5 x 3.0 x 3.7  cm fluid collection with peripheral calcifications within the anterior  abdominal wall just superficial to the abdominal musculature.  Findings most  consistent with seroma.  Superimposed infection cannot be excluded.    Pt's daughter provided information about prior imagine completed at [facility] (see below):    CT abdomen/pelvis with contrast 10/19/2018:    \"ABD WALL:  Approximately 2.1 x 6.2 x 2.2 cm fluid collection at the ventral lower abdominal wall near the midline.     MPRESSION     Lower ventral abdominal wall fluid collection near the midline.  This may represent a seroma or possibly a abscess.  Correlation with the location of pain is recommended. \"    Message sent to Provider ([facility]- on call)    Rocio NATARAJANN, RN   [phone]

## 2024-02-18 NOTE — PLAN OF CARE
Problem: Discharge Planning  Goal: Discharge to home or other facility with appropriate resources  2/18/2024 0707 by Rocio Fitch RN  Outcome: Progressing  2/17/2024 2152 by Frank Allred RN  Outcome: Progressing     Problem: Safety - Adult  Goal: Free from fall injury  2/18/2024 0707 by Rocio Fitch RN  Outcome: Progressing  2/17/2024 2152 by Frank Allred RN  Outcome: Progressing     Problem: Skin/Tissue Integrity  Goal: Absence of new skin breakdown  Outcome: Progressing     Problem: ABCDS Injury Assessment  Goal: Absence of physical injury  Outcome: Progressing     Problem: Pain  Goal: Verbalizes/displays adequate comfort level or baseline comfort level  Outcome: Progressing     Problem: Nutrition Deficit:  Goal: Optimize nutritional status  Outcome: Progressing

## 2024-02-19 LAB
ANION GAP SERPL CALCULATED.3IONS-SCNC: 12 MMOL/L (ref 3–16)
BASOPHILS # BLD: 0.1 K/UL (ref 0–0.2)
BASOPHILS NFR BLD: 0.5 %
BUN SERPL-MCNC: 22 MG/DL (ref 7–20)
CALCIUM SERPL-MCNC: 9.2 MG/DL (ref 8.3–10.6)
CHLORIDE SERPL-SCNC: 99 MMOL/L (ref 99–110)
CO2 SERPL-SCNC: 27 MMOL/L (ref 21–32)
CREAT SERPL-MCNC: 0.6 MG/DL (ref 0.6–1.2)
DEPRECATED RDW RBC AUTO: 13.8 % (ref 12.4–15.4)
EOSINOPHIL # BLD: 0.3 K/UL (ref 0–0.6)
EOSINOPHIL NFR BLD: 2.2 %
GFR SERPLBLD CREATININE-BSD FMLA CKD-EPI: >60 ML/MIN/{1.73_M2}
GLUCOSE BLD-MCNC: 166 MG/DL (ref 70–99)
GLUCOSE BLD-MCNC: 191 MG/DL (ref 70–99)
GLUCOSE BLD-MCNC: 222 MG/DL (ref 70–99)
GLUCOSE BLD-MCNC: 258 MG/DL (ref 70–99)
GLUCOSE SERPL-MCNC: 201 MG/DL (ref 70–99)
HCT VFR BLD AUTO: 34.2 % (ref 36–48)
HGB BLD-MCNC: 11.1 G/DL (ref 12–16)
LYMPHOCYTES # BLD: 1.9 K/UL (ref 1–5.1)
LYMPHOCYTES NFR BLD: 15.4 %
MCH RBC QN AUTO: 29.7 PG (ref 26–34)
MCHC RBC AUTO-ENTMCNC: 32.5 G/DL (ref 31–36)
MCV RBC AUTO: 91.6 FL (ref 80–100)
MONOCYTES # BLD: 1.1 K/UL (ref 0–1.3)
MONOCYTES NFR BLD: 8.8 %
NEUTROPHILS # BLD: 8.8 K/UL (ref 1.7–7.7)
NEUTROPHILS NFR BLD: 73.1 %
PERFORMED ON: ABNORMAL
PLATELET # BLD AUTO: 361 K/UL (ref 135–450)
PMV BLD AUTO: 6.9 FL (ref 5–10.5)
POTASSIUM SERPL-SCNC: 3.9 MMOL/L (ref 3.5–5.1)
RBC # BLD AUTO: 3.74 M/UL (ref 4–5.2)
SODIUM SERPL-SCNC: 138 MMOL/L (ref 136–145)
WBC # BLD AUTO: 12.1 K/UL (ref 4–11)

## 2024-02-19 PROCEDURE — 36415 COLL VENOUS BLD VENIPUNCTURE: CPT

## 2024-02-19 PROCEDURE — 2580000003 HC RX 258: Performed by: STUDENT IN AN ORGANIZED HEALTH CARE EDUCATION/TRAINING PROGRAM

## 2024-02-19 PROCEDURE — 6370000000 HC RX 637 (ALT 250 FOR IP): Performed by: STUDENT IN AN ORGANIZED HEALTH CARE EDUCATION/TRAINING PROGRAM

## 2024-02-19 PROCEDURE — 6360000002 HC RX W HCPCS: Performed by: STUDENT IN AN ORGANIZED HEALTH CARE EDUCATION/TRAINING PROGRAM

## 2024-02-19 PROCEDURE — 92507 TX SP LANG VOICE COMM INDIV: CPT

## 2024-02-19 PROCEDURE — 80048 BASIC METABOLIC PNL TOTAL CA: CPT

## 2024-02-19 PROCEDURE — 97530 THERAPEUTIC ACTIVITIES: CPT

## 2024-02-19 PROCEDURE — 97535 SELF CARE MNGMENT TRAINING: CPT

## 2024-02-19 PROCEDURE — 94640 AIRWAY INHALATION TREATMENT: CPT

## 2024-02-19 PROCEDURE — 92526 ORAL FUNCTION THERAPY: CPT

## 2024-02-19 PROCEDURE — 97129 THER IVNTJ 1ST 15 MIN: CPT

## 2024-02-19 PROCEDURE — 1280000000 HC REHAB R&B

## 2024-02-19 PROCEDURE — 97112 NEUROMUSCULAR REEDUCATION: CPT

## 2024-02-19 PROCEDURE — 85025 COMPLETE CBC W/AUTO DIFF WBC: CPT

## 2024-02-19 RX ORDER — AMOXICILLIN AND CLAVULANATE POTASSIUM 875; 125 MG/1; MG/1
1 TABLET, FILM COATED ORAL 2 TIMES DAILY
Status: COMPLETED | OUTPATIENT
Start: 2024-02-19 | End: 2024-02-23

## 2024-02-19 RX ADMIN — Medication 2 CAPSULE: at 09:21

## 2024-02-19 RX ADMIN — Medication 10 ML: at 09:39

## 2024-02-19 RX ADMIN — CHOLECALCIFEROL TAB 25 MCG (1000 UNIT) 1000 UNITS: 25 TAB at 09:22

## 2024-02-19 RX ADMIN — AMOXICILLIN AND CLAVULANATE POTASSIUM 1 TABLET: 875; 125 TABLET, FILM COATED ORAL at 22:14

## 2024-02-19 RX ADMIN — AMOXICILLIN AND CLAVULANATE POTASSIUM 1 TABLET: 875; 125 TABLET, FILM COATED ORAL at 09:22

## 2024-02-19 RX ADMIN — SACUBITRIL AND VALSARTAN 1 TABLET: 24; 26 TABLET, FILM COATED ORAL at 09:22

## 2024-02-19 RX ADMIN — Medication 1 TABLET: at 09:22

## 2024-02-19 RX ADMIN — Medication 10 ML: at 22:14

## 2024-02-19 RX ADMIN — CARVEDILOL 12.5 MG: 6.25 TABLET, FILM COATED ORAL at 09:22

## 2024-02-19 RX ADMIN — CLOPIDOGREL 75 MG: 75 TABLET, FILM COATED ORAL at 09:22

## 2024-02-19 RX ADMIN — CETIRIZINE HYDROCHLORIDE 10 MG: 10 TABLET, FILM COATED ORAL at 09:22

## 2024-02-19 RX ADMIN — ATORVASTATIN CALCIUM 80 MG: 80 TABLET, FILM COATED ORAL at 22:14

## 2024-02-19 RX ADMIN — ENOXAPARIN SODIUM 40 MG: 100 INJECTION SUBCUTANEOUS at 09:38

## 2024-02-19 RX ADMIN — INSULIN GLARGINE 25 UNITS: 100 INJECTION, SOLUTION SUBCUTANEOUS at 09:38

## 2024-02-19 RX ADMIN — SPIRONOLACTONE 12.5 MG: 25 TABLET ORAL at 09:22

## 2024-02-19 RX ADMIN — ASPIRIN 81 MG: 81 TABLET, COATED ORAL at 09:22

## 2024-02-19 RX ADMIN — FUROSEMIDE 40 MG: 40 TABLET ORAL at 09:22

## 2024-02-19 RX ADMIN — CARVEDILOL 12.5 MG: 6.25 TABLET, FILM COATED ORAL at 19:55

## 2024-02-19 RX ADMIN — INSULIN LISPRO 2 UNITS: 100 INJECTION, SOLUTION INTRAVENOUS; SUBCUTANEOUS at 12:32

## 2024-02-19 RX ADMIN — SACUBITRIL AND VALSARTAN 1 TABLET: 24; 26 TABLET, FILM COATED ORAL at 22:14

## 2024-02-19 RX ADMIN — IPRATROPIUM BROMIDE AND ALBUTEROL SULFATE 1 DOSE: .5; 3 SOLUTION RESPIRATORY (INHALATION) at 04:50

## 2024-02-19 ASSESSMENT — PAIN SCALES - GENERAL: PAINLEVEL_OUTOF10: 0

## 2024-02-19 NOTE — PROGRESS NOTES
[facility] - Inpatient Rehabilitation Department   Phone: [phone]    Speech Therapy    [] Initial Evaluation            [x] Daily Treatment Note         [] Discharge Summary      Patient: Rissa Becerra   : 1950   MRN: [id number]   Date of Service:  2024  Admitting Diagnosis: Acute cerebrovascular accident (CVA) due to ischemia (HCC)  Current Admission Summary: Rissa Becerra is a 73 y.o. female with history of DM 2, CAD, chronic combined CHF, HTN, recent diagnosis of stroke at [facility] a few days came to ER with complaints of worsening L sided weakness and falls.  Patient has slurred speech and L facial droop at [facility].  She was discharged to home with home care.  Family is very supportive.  Noted she had progressing weakness and falls.  Today, noted to have flaccid LUE/LLE.  Brought to ER for evaluation.  No CP, SOB, HA or dizziness.  Patient is awake and answering questions.  , daughters and son are at bedside with her today.  Otherwise complete ROS is negative unless listed above.   Past Medical History:  has a past medical history of Arthropathy, Asthma, Bell's palsy, Bronchitis, Diabetes mellitus (HCC), Diverticulitis, Hyperlipidemia, Hypertension, Migraine, Polyneuropathy in diabetes ([facility]), and Vertigo.  Past Surgical History:  has a past surgical history that includes hernia repair; Carpal tunnel release; Total knee arthroplasty; Rotator cuff repair; and Percutaneous Transluminal Coronary Angio (2023).  Recent Chest xray: Decreased perihilar airspace disease on the right   Recent MRI Brain: 1. Scattered areas of acute infarct within the right MCA territory.  No  significant mass effect or midline shift.  2. Otherwise, no acute intracranial abnormality.  3. Mild to moderate global parenchymal volume loss with mild chronic  microvascular ischemic changes.  Instrumental Swallow Study: Modified Barium Swallow evaluation completed on 2024.Patient  information to 80%, for improved recall and retention of newly learned information   Pt will complete word associative tasks to improve mental flexibility, complex thinking, and working memory skills with 80% accuracy.    Pt will improve functional visual scanning for reading comprehension and functional visual task completion with incorporation of compensatory strategies, to mild cues      Therapy Session Time      Session 1 Session 2   Time In 0800 1215   Time Out 0830 1245   Time Code Minutes 8 8   Individual Minutes 30 30     Timed Code Treatment Minutes: 16 Minutes  Total Treatment Minutes:  60 Minutes    Electronically Signed By:   James Cheng MA CF-SLP   COND. [id number]  2/19/2024 1:10 PM

## 2024-02-19 NOTE — CARE COORDINATION
Social Work Admission Assessment    Objective:  Met with pt and her spouse and 3 of her children to complete initial assessment and review role of  in rehab process.  Pt oriented to unit.  Pt states understanding of this.     Current Home Situation:  Patient lives with her spouse and they reside in a one story home. Spouse is able to assist the patient in the home. Patient has 7 children.  Children provide support and care.    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  Patient also gives support in clerical/office needs.      Accessibility to community resources/transportation: Patient has not been active with any community support programs but is interested in being referred to the [facility].  If home health care is recommended patient would like [facility].  Patient's daughter works for the provider./Family will transport the patient as needed.     Has pt experienced a recent loss or signigicant life event that would impact their care or ability to participate?  No    Has pt ever been treated for emotional disorders?  No    How does pt and family cope with stressful events and this hospitalization?  Patient jean-pierre with stressful events and this hospitalization by praying.  Patient reports that her leyla is very important to her.    Special Problem Areas: None     Discharge Plan:  To be determine with patient's progress.    Impression/Plan: Rissa Becerra is a 73 year old female that has been admitted to ARU. Provided patient with this SW's contact information to contact as needed. Will continue to follow for support and discharge planning.     Electronically signed by ERI BRYSON on 2/19/2024 at 5:07 PM

## 2024-02-19 NOTE — PROGRESS NOTES
Rissa Becerra  2/19/2024  [id number]    Chief Complaint: Acute cerebrovascular accident (CVA) due to ischemia (HCC)    Subjective:   Patient had Tmax 99 F over the weekend, which prompted infectious workup.  She underwent CT A/P with contrast, which was negative for any acute intra-abdominal or pelvic process, did demonstrate large broad-based ventral hernia and redemonstrated previously noted postsurgical changes with a 5.5 x 3.0 x 3.7 cm fluid collection within the anterior abdominal wall, most consistent with seroma.  She is feeling well today.  She denies any fevers, chills, chest pain, dyspnea.  She endorses stable left ankle pain.  Her appetite is adequate.    Last BM: Stool Occurrence: 1 (02/18/24 1332)  PRNs administered past 24h: Tylenol    Objective:  Patient Vitals for the past 24 hrs:   BP Temp Temp src Pulse Resp SpO2 Weight   02/19/24 0915 129/77 99.1 °F (37.3 °C) Oral 84 18 -- --   02/19/24 0454 -- -- -- -- -- 94 % --   02/19/24 0430 -- -- -- -- -- -- 89.4 kg (197 lb)   02/18/24 2041 128/81 98.1 °F (36.7 °C) Oral 70 16 94 % --   02/18/24 1853 -- 98.5 °F (36.9 °C) Oral -- -- -- --   02/18/24 1641 -- 99.3 °F (37.4 °C) Oral -- -- -- --     Gen: No distress, pleasant.   HEENT: Normocephalic, atraumatic.   CV: Regular rate and rhythm. Extremities warm, well perfused.   Resp: No respiratory distress. CTAB.  Abd: Soft, nontender.  Ext: Left lower leg in splint with ACE wrap.  Neuro: Alert, oriented, appropriately interactive.  Flaccid left hemiparesis.    Laboratory data: Available via EMR.     Therapy progress:       PT    Supine to Sit: Dependent  Sit to Supine: Dependent   Sit to Stand:    Chair/Bed to Chair Transfer: Dependent  Car Transfer:    Ambulation 10 ft:    Ambulation 50 ft:    Ambulation 150 ft:    Stairs - 1 Step:    Stairs - 4 Step:    Stairs - 12 Step:      OT    Eating: Substantial/maximal assistance  Oral Hygiene: Dependent  Bathing: Dependent  Upper Body Dressing: Dependent  Lower Body  Dressing: Dependent  Toilet Transfer:    Toilet Hygiene: Dependent    Speech Therapy    Pt presents with mild/moderate cognitive decline characterized by deficits in thought organization, attention, memory, and problem solving. The pt additionally presents with mild/moderate motor speech deficits characterized by decreased rate, blended word boundaries, decreased breath support, and reduced phonation/respiration coordination resulting in decreased speech intelligibility. The pt additionally presents with left visual cut/neglect and will benefit from further analysis to differentiate and instruct the pt/caregivers in appropriate compensatory strategies. The pt and pt's daughter report that the pt was previously an active , active within her [employer] including cooking meals for members, playing the piano and singing. The pt is not at her previous baseline for speech language and cognitive linguistic skills. Based on today's assessment, recommend speech language and cognitive linguistic therapy to address deficits and facilitate safe return to prior level of function.    Body mass index is 36.03 kg/m².    Assessment:    This patient continues to require an ARU level of care from all disciplines to address the following issues:    Patient Active Problem List   Diagnosis    Localized edema    Diabetic polyneuropathy (HCC)    Acute CVA (cerebrovascular accident) ([facility])    Left hemiplegia ([facility])    Dysphagia    CAD (coronary artery disease)    DM2 (diabetes mellitus, type 2) ([facility])    HTN (hypertension), benign    Obesity (BMI 30-39.9)    Acute left-sided weakness    Arterial ischemic stroke, ICA, right, acute ([facility])    DM (diabetes mellitus), secondary, with complications ([facility])    Dyslipidemia    Closed displaced fracture of lateral malleolus of left fibula    Acute cerebrovascular accident (CVA) due to ischemia ([facility])       Functional progress: 25 feet moderate assistance to propel wheelchair with right upper

## 2024-02-19 NOTE — PLAN OF CARE
Problem: Discharge Planning  Goal: Discharge to home or other facility with appropriate resources  Outcome: Progressing  Flowsheets (Taken 2/18/2024 2056 by Frank Allred RN)  Discharge to home or other facility with appropriate resources: Identify barriers to discharge with patient and caregiver     Problem: Safety - Adult  Goal: Free from fall injury  Outcome: Progressing     Problem: Skin/Tissue Integrity  Goal: Absence of new skin breakdown  Description: 1.  Monitor for areas of redness and/or skin breakdown  2.  Assess vascular access sites hourly  3.  Every 4-6 hours minimum:  Change oxygen saturation probe site  4.  Every 4-6 hours:  If on nasal continuous positive airway pressure, respiratory therapy assess nares and determine need for appliance change or resting period.  Outcome: Progressing     Problem: ABCDS Injury Assessment  Goal: Absence of physical injury  Outcome: Progressing     Problem: Pain  Goal: Verbalizes/displays adequate comfort level or baseline comfort level  Outcome: Progressing     Problem: Nutrition Deficit:  Goal: Optimize nutritional status  Outcome: Progressing

## 2024-02-19 NOTE — PROGRESS NOTES
Physical Therapy    [facility] - Inpatient Rehabilitation Department   Phone: [phone]    Physical Therapy    [] Initial Evaluation            [x] Daily Treatment Note         [] Discharge Summary      Patient: Rissa Becerra   : 1950   MRN: [id number]   Date of Service:  2024  Admitting Diagnosis: Acute cerebrovascular accident (CVA) due to ischemia (HCC)  Current Admission Summary:  Rissa Becerra is a 73 y.o. female with PMHx notable for HTN, HLD, type 2 diabetes mellitus, CHF, recent R MCA stroke (seen at OSH, started on DAPT and discharged home), who presented on 24 with worsening facial droop, left visual field loss, left sided weakness. She was ambulating with device, with minimal left sided weakness at time of prior hospital discharge, gradually developed worsening weakness resulting in several falls, including one causing L ankle fracture. She returned to [facility] ED. CT Head showed R frontal hypoattenuation. CTA Head/Neck showed L PCA occlusion, R M2 and M3 segmental narrowing. MRI Brain showed R MCA territory infarct. Neurology was consulted, recommending DAPT, statin, cardiac event monitor. Orthopedics Concomitant injuries included: L ankle fracture, for which Ortho was consulted, recommending NWB LLE and non-op management in the context of recent stroke. Hospital course complicated by: aspiration pneumonia.      Currently patient reports left visual field deficit, dysarthria, dysphagia, dense left hemiparesis, left hemisensory loss.   Past Medical History:  has a past medical history of Arthropathy, Asthma, Bell's palsy, Bronchitis, Diabetes mellitus ([facility]), Diverticulitis, Hyperlipidemia, Hypertension, Migraine, Polyneuropathy in diabetes (HCC), and Vertigo.  Past Surgical History:  has a past surgical history that includes hernia repair; Carpal tunnel release; Total knee arthroplasty; Rotator cuff repair; and Percutaneous Transluminal Coronary Angio

## 2024-02-19 NOTE — PROGRESS NOTES
[facility] - Inpatient Rehabilitation Department   Phone: [phone]    Occupational Therapy    [] Initial Evaluation            [x] Daily Treatment Note         [] Discharge Summary      Patient: Rissa Becerra   : 1950   MRN: [id number]   Date of Service:  2024    Admitting Diagnosis:  Acute cerebrovascular accident (CVA) due to ischemia (HCC)  Current Admission Summary: Rissa Becerra is a 73 y.o. female with PMHx notable for HTN, HLD, type 2 diabetes mellitus, CHF, recent R MCA stroke (seen at OSH, started on DAPT and discharged home), who presented on 24 with worsening facial droop, left visual field loss, left sided weakness. She was ambulating with device, with minimal left sided weakness at time of prior hospital discharge, gradually developed worsening weakness resulting in several falls, including one causing L ankle fracture. She returned to [facility] ED. CT Head showed R frontal hypoattenuation. CTA Head/Neck showed L PCA occlusion, R M2 and M3 segmental narrowing. MRI Brain showed R MCA territory infarct. Echo with LVEF 55-60%, negative bubble study. Neurology was consulted, recommending DAPT, statin, cardiac event monitor. Orthopedics Concomitant injuries included: L ankle fracture, for which Ortho was consulted, recommending NWB LLE and non-op management in the context of recent stroke. Hospital course complicated by: aspiration pneumonia, L shoulder pain (CT L shoulder negative for fracture), urinary retention.      Patient reports that she is doing well today. She denies any fevers, chills, chest pain, shortness of breath. Endorses stable L visual field deficits, dysarthria, dysphagia, left sided weakness. Endorses left ankle pain. Endorses urinary retention, requiring straight cath since Kilgore catheter discontinued.      Past Medical History:  has a past medical history of Arthropathy, Asthma, Bell's palsy, Bronchitis, Diabetes mellitus (HCC), Diverticulitis,  w/ a R MCA territory infarct  Now, pt is NWB d/t L ankle fracture. Typically, pt is mod I w/ SPC for mobility, transfers, and ADLs. Pt continues to require extensive 2 person assist for all functional tasks. Pt tolerated w/c level activity well this date and left in supportive w/c following session for improved midline posturing. Pt limited by L hemiplegia, L visual neglect (vs. Possible L field cut), impaired balance, impaired gross motor coordination, and deficits in cognition. Skilled OT warranted to improve safety and independence in occupational pursuits in order to promote return to PLOF    Safety Interventions: patient left in chair, call light within reach, patient at risk for falls, nurse notified, and family/caregiver present       Plan  Frequency: 5 x/week, 60 min/day  Current Treatment Recommendations: strengthening, ROM, balance training, functional mobility training, transfer training, endurance training, neuromuscular re-education, wheelchair mobility training, modalities, patient/caregiver education, ADL/self-care training, IADL training, pain management, home exercise program, safety education, equipment evaluation/education, and positioning  Goals  Patient Goals: go to the bathroom on the toilet   Short Term Goals:  Time Frame: two weeks  Patient will complete toileting at maximum assistance   Patient will complete functional transfers at maximum assistance   Patient will increase functional sitting balance to CGA for improved ADL completion  Patient will complete bed mobility at minimal assistance   Patient will attend to L visual field with min verbal cues during functional activity.  Long Term Goals:  Time Frame: three weeks  Patient will complete upper body ADL at minimal assistance   Patient will complete lower body ADL at minimal assistance   Patient will complete toileting at minimal assistance   Patient will complete functional transfers at minimal assistance   Patient will complete

## 2024-02-20 LAB
DEPRECATED RDW RBC AUTO: 13.7 % (ref 12.4–15.4)
GLUCOSE BLD-MCNC: 179 MG/DL (ref 70–99)
GLUCOSE BLD-MCNC: 193 MG/DL (ref 70–99)
GLUCOSE BLD-MCNC: 227 MG/DL (ref 70–99)
GLUCOSE BLD-MCNC: 234 MG/DL (ref 70–99)
HCT VFR BLD AUTO: 32.6 % (ref 36–48)
HGB BLD-MCNC: 10.8 G/DL (ref 12–16)
MCH RBC QN AUTO: 30.5 PG (ref 26–34)
MCHC RBC AUTO-ENTMCNC: 33.2 G/DL (ref 31–36)
MCV RBC AUTO: 91.8 FL (ref 80–100)
PERFORMED ON: ABNORMAL
PLATELET # BLD AUTO: 355 K/UL (ref 135–450)
PMV BLD AUTO: 6.5 FL (ref 5–10.5)
RBC # BLD AUTO: 3.55 M/UL (ref 4–5.2)
WBC # BLD AUTO: 11.5 K/UL (ref 4–11)

## 2024-02-20 PROCEDURE — 2580000003 HC RX 258: Performed by: STUDENT IN AN ORGANIZED HEALTH CARE EDUCATION/TRAINING PROGRAM

## 2024-02-20 PROCEDURE — 97112 NEUROMUSCULAR REEDUCATION: CPT

## 2024-02-20 PROCEDURE — 97530 THERAPEUTIC ACTIVITIES: CPT

## 2024-02-20 PROCEDURE — 85027 COMPLETE CBC AUTOMATED: CPT

## 2024-02-20 PROCEDURE — 1280000000 HC REHAB R&B

## 2024-02-20 PROCEDURE — 97535 SELF CARE MNGMENT TRAINING: CPT

## 2024-02-20 PROCEDURE — 97129 THER IVNTJ 1ST 15 MIN: CPT

## 2024-02-20 PROCEDURE — 6370000000 HC RX 637 (ALT 250 FOR IP): Performed by: STUDENT IN AN ORGANIZED HEALTH CARE EDUCATION/TRAINING PROGRAM

## 2024-02-20 PROCEDURE — 92507 TX SP LANG VOICE COMM INDIV: CPT

## 2024-02-20 PROCEDURE — 6360000002 HC RX W HCPCS: Performed by: STUDENT IN AN ORGANIZED HEALTH CARE EDUCATION/TRAINING PROGRAM

## 2024-02-20 PROCEDURE — 92526 ORAL FUNCTION THERAPY: CPT

## 2024-02-20 RX ORDER — IPRATROPIUM BROMIDE AND ALBUTEROL SULFATE 2.5; .5 MG/3ML; MG/3ML
1 SOLUTION RESPIRATORY (INHALATION)
Status: DISCONTINUED | OUTPATIENT
Start: 2024-02-20 | End: 2024-02-23

## 2024-02-20 RX ADMIN — METFORMIN HYDROCHLORIDE 1000 MG: 500 TABLET ORAL at 10:00

## 2024-02-20 RX ADMIN — FUROSEMIDE 40 MG: 40 TABLET ORAL at 09:43

## 2024-02-20 RX ADMIN — CETIRIZINE HYDROCHLORIDE 10 MG: 10 TABLET, FILM COATED ORAL at 09:44

## 2024-02-20 RX ADMIN — CHOLECALCIFEROL TAB 25 MCG (1000 UNIT) 1000 UNITS: 25 TAB at 09:43

## 2024-02-20 RX ADMIN — Medication 10 ML: at 22:02

## 2024-02-20 RX ADMIN — INSULIN GLARGINE 25 UNITS: 100 INJECTION, SOLUTION SUBCUTANEOUS at 09:45

## 2024-02-20 RX ADMIN — Medication 1 TABLET: at 10:00

## 2024-02-20 RX ADMIN — AMOXICILLIN AND CLAVULANATE POTASSIUM 1 TABLET: 875; 125 TABLET, FILM COATED ORAL at 09:43

## 2024-02-20 RX ADMIN — METFORMIN HYDROCHLORIDE 1000 MG: 500 TABLET ORAL at 17:06

## 2024-02-20 RX ADMIN — CARVEDILOL 12.5 MG: 6.25 TABLET, FILM COATED ORAL at 17:06

## 2024-02-20 RX ADMIN — AMOXICILLIN AND CLAVULANATE POTASSIUM 1 TABLET: 875; 125 TABLET, FILM COATED ORAL at 21:58

## 2024-02-20 RX ADMIN — INSULIN LISPRO 2 UNITS: 100 INJECTION, SOLUTION INTRAVENOUS; SUBCUTANEOUS at 12:06

## 2024-02-20 RX ADMIN — Medication 2 CAPSULE: at 09:42

## 2024-02-20 RX ADMIN — SPIRONOLACTONE 12.5 MG: 25 TABLET ORAL at 09:43

## 2024-02-20 RX ADMIN — ASPIRIN 81 MG: 81 TABLET, COATED ORAL at 09:44

## 2024-02-20 RX ADMIN — CARVEDILOL 12.5 MG: 6.25 TABLET, FILM COATED ORAL at 09:44

## 2024-02-20 RX ADMIN — ENOXAPARIN SODIUM 40 MG: 100 INJECTION SUBCUTANEOUS at 09:44

## 2024-02-20 RX ADMIN — CLOPIDOGREL 75 MG: 75 TABLET, FILM COATED ORAL at 09:44

## 2024-02-20 RX ADMIN — SACUBITRIL AND VALSARTAN 1 TABLET: 24; 26 TABLET, FILM COATED ORAL at 09:43

## 2024-02-20 RX ADMIN — Medication 10 ML: at 10:06

## 2024-02-20 RX ADMIN — SACUBITRIL AND VALSARTAN 1 TABLET: 24; 26 TABLET, FILM COATED ORAL at 21:58

## 2024-02-20 RX ADMIN — ATORVASTATIN CALCIUM 80 MG: 80 TABLET, FILM COATED ORAL at 21:58

## 2024-02-20 ASSESSMENT — PAIN SCALES - GENERAL: PAINLEVEL_OUTOF10: 0

## 2024-02-20 NOTE — PLAN OF CARE
Problem: Discharge Planning  Goal: Discharge to home or other facility with appropriate resources  2/20/2024 1031 by Irina Shore RN  Outcome: Progressing  Flowsheets (Taken 2/20/2024 1007)  Discharge to home or other facility with appropriate resources:   Identify barriers to discharge with patient and caregiver   Identify discharge learning needs (meds, wound care, etc)  2/20/2024 0247 by Antonietta Hubbard RN  Outcome: Progressing     Problem: Safety - Adult  Goal: Free from fall injury  2/20/2024 1031 by Irina Shore RN  Outcome: Progressing  2/20/2024 0247 by Antonietta Hubbard RN  Outcome: Progressing     Problem: Safety - Adult  Goal: Free from fall injury  2/20/2024 1031 by Irina Shore RN  Outcome: Progressing  2/20/2024 0247 by Antonietta Hubbard RN  Outcome: Progressing     Problem: Skin/Tissue Integrity  Goal: Absence of new skin breakdown  Description: 1.  Monitor for areas of redness and/or skin breakdown  2.  Assess vascular access sites hourly  3.  Every 4-6 hours minimum:  Change oxygen saturation probe site  4.  Every 4-6 hours:  If on nasal continuous positive airway pressure, respiratory therapy assess nares and determine need for appliance change or resting period.  2/20/2024 1031 by Irina Shore RN  Outcome: Progressing  2/20/2024 0247 by Antonietta Hubbard RN  Outcome: Progressing     Problem: ABCDS Injury Assessment  Goal: Absence of physical injury  2/20/2024 1031 by Irina Shore RN  Outcome: Progressing  2/20/2024 0247 by Antonietta Hubbard RN  Outcome: Progressing     Problem: Pain  Goal: Verbalizes/displays adequate comfort level or baseline comfort level  2/20/2024 1031 by Irina Shore RN  Outcome: Progressing  Flowsheets (Taken 2/20/2024 0930)  Verbalizes/displays adequate comfort level or baseline comfort level:   Encourage patient to monitor pain and request assistance   Assess pain using appropriate pain scale   Administer analgesics based on type and severity of pain and evaluate  response  2/20/2024 0247 by Antonietta Hubbard, RN  Outcome: Progressing     Problem: Nutrition Deficit:  Goal: Optimize nutritional status  2/20/2024 1031 by Irina Shore, RN  Outcome: Progressing  2/20/2024 0247 by Antonietta Hubbard, JEANINE  Outcome: Progressing

## 2024-02-20 NOTE — PROGRESS NOTES
[facility] - Inpatient Rehabilitation Department   Phone: [phone]    Physical Therapy    [] Initial Evaluation            [x] Daily Treatment Note         [] Discharge Summary      Patient: Rissa Becerra   : 1950   MRN: [id number]   Date of Service:  2024  Admitting Diagnosis: Acute cerebrovascular accident (CVA) due to ischemia (HCC)  Current Admission Summary: Rissa Becerra is a 73 y.o. female with PMHx notable for HTN, HLD, type 2 diabetes mellitus, CHF, recent R MCA stroke (seen at OSH, started on DAPT and discharged home), who presented on 24 with worsening facial droop, left visual field loss, left sided weakness. She was ambulating with device, with minimal left sided weakness at time of prior hospital discharge, gradually developed worsening weakness resulting in several falls, including one causing L ankle fracture. She returned to [facility] ED. CT Head showed R frontal hypoattenuation. CTA Head/Neck showed L PCA occlusion, R M2 and M3 segmental narrowing. MRI Brain showed R MCA territory infarct. Neurology was consulted, recommending DAPT, statin, cardiac event monitor. Orthopedics Concomitant injuries included: L ankle fracture, for which Ortho was consulted, recommending NWB LLE and non-op management in the context of recent stroke. Hospital course complicated by: aspiration pneumonia.   Past Medical History:  has a past medical history of Arthropathy, Asthma, Bell's palsy, Bronchitis, Diabetes mellitus (HCC), Diverticulitis, Hyperlipidemia, Hypertension, Migraine, Polyneuropathy in diabetes ([facility]), and Vertigo.  Past Surgical History:  has a past surgical history that includes hernia repair; Carpal tunnel release; Total knee arthroplasty; Rotator cuff repair; and Percutaneous Transluminal Coronary Angio (2023).  Discharge Recommendations: To be determined with progress   DME Required For Discharge: DME to be determined pending patient  progress  Precautions/Restrictions: weight bearing, modified diet  Weight Bearing Restrictions: non weight bearing  [] Right Upper Extremity  [] Left Upper Extremity [] Right Lower Extremity  [x] Left Lower Extremity     Required Braces/Orthotics:  ACE wrap with splint   [] Right  [x] Left  Positional Restrictions:no positional restrictions    Pre-Admission Information   Lives With: spouse                  Type of Home: house  Home Layout: one level  Home Access:  2 step to enter without rails   Bathroom Layout: tub/shower unit  Bathroom Equipment: grab bars in shower, hand held shower head  Toilet Height: standard height  Home Equipment: rolling walker, single point cane  Transfer Assistance: modified independent with use of SPC  Ambulation Assistance:modified independent with use of SPC  ADL Assistance: independent with all ADL's  IADL Assistance: independent with homemaking tasks  Active :        [x] Yes                 [] No  Hand Dominance: [] Left                 [x] Right  Current Employment: retired.  Occupation:   Hobbies:   Recent Falls: Pt reports 6 falls since last admission.     Examination   Vision:   Very limited tracking to the L, appears to be a field cut  Hearing:   WFL  Observation:   (R) pushing syndrome with decreased (L) visual attention      Subjective  General: Patient seated in recliner upon arrival with daughter present.  Agreeable to therapy session.  Pain: Patient does not rate upon questioning with reports of low back pain during mobility.  Pain Interventions: repositioned  and therapy activities modified       Functional Mobility  Bed Mobility:  Supine to Sit: 2 person assistance with mod (A) of 2   Rolling Left: moderate assistance  Comments: Assist for LE positioning and guidance of arm to HR  Transfers:  Sit to stand transfer: 2 person assistance with max (A) of 2 in // bars for sit  <=> stand transition in addition to third therapist holding (L) LE off ground to maintain WB

## 2024-02-20 NOTE — PROGRESS NOTES
Rissa Becerra  2/20/2024  [id number]    Chief Complaint: Acute cerebrovascular accident (CVA) due to ischemia (HCC)    Subjective:   Patient reports that she is doing well today. She was able to stand with assistance today and was pleased with this. Frustrated with her overall pace of recovery, offered encouragement education on typical course of stroke recovery. Denies any fevers, chills, chest pain, shortness of breath. Endorses cough.     Last BM: Stool Occurrence: 1 (02/20/24 0813)  PRNs administered past 24h: none    Objective:  Patient Vitals for the past 24 hrs:   BP Temp Temp src Pulse Resp SpO2 Height Weight   02/20/24 1706 101/66 -- -- 79 -- -- -- --   02/20/24 0930 114/74 98.4 °F (36.9 °C) Oral 73 16 95 % -- --   02/20/24 0652 -- -- -- -- -- -- 1.575 m (5' 2\") 91.2 kg (201 lb 1.6 oz)   02/19/24 2200 115/67 98.8 °F (37.1 °C) Oral 74 18 93 % -- --     Gen: No distress, pleasant.   HEENT: Normocephalic, atraumatic.   CV: Regular rate and rhythm. Extremities warm, well perfused.   Resp: No respiratory distress. CTAB.  Abd: Soft, nontender.  Ext: Left lower leg in splint with ACE wrap.  Neuro: Alert, oriented, appropriately interactive.  Flaccid left hemiparesis.    Laboratory data: Available via EMR.     Therapy progress:       PT    Supine to Sit: Dependent  Sit to Supine: Dependent   Sit to Stand:    Chair/Bed to Chair Transfer: Dependent  Car Transfer:    Ambulation 10 ft:    Ambulation 50 ft:    Ambulation 150 ft:    Stairs - 1 Step:    Stairs - 4 Step:    Stairs - 12 Step:      OT    Eating: Substantial/maximal assistance  Oral Hygiene: Dependent  Bathing: Dependent  Upper Body Dressing: Dependent  Lower Body Dressing: Dependent  Toilet Transfer:    Toilet Hygiene: Dependent    Speech Therapy    Pt presents with mild/moderate cognitive decline characterized by deficits in thought organization, attention, memory, and problem solving. The pt additionally presents with mild/moderate motor speech deficits  characterized by decreased rate, blended word boundaries, decreased breath support, and reduced phonation/respiration coordination resulting in decreased speech intelligibility. The pt additionally presents with left visual cut/neglect and will benefit from further analysis to differentiate and instruct the pt/caregivers in appropriate compensatory strategies. The pt and pt's daughter report that the pt was previously an active , active within her [employer] including cooking meals for members, playing the piano and singing. The pt is not at her previous baseline for speech language and cognitive linguistic skills. Based on today's assessment, recommend speech language and cognitive linguistic therapy to address deficits and facilitate safe return to prior level of function.    Body mass index is 36.78 kg/m².    Assessment:    This patient continues to require an ARU level of care from all disciplines to address the following issues:    Patient Active Problem List   Diagnosis    Localized edema    Diabetic polyneuropathy (HCC)    Acute CVA (cerebrovascular accident) ([facility])    Left hemiplegia (HCC)    Dysphagia    CAD (coronary artery disease)    DM2 (diabetes mellitus, type 2) ([facility])    HTN (hypertension), benign    Obesity (BMI 30-39.9)    Acute left-sided weakness    Arterial ischemic stroke, ICA, right, acute (HCC)    DM (diabetes mellitus), secondary, with complications ([facility])    Dyslipidemia    Closed displaced fracture of lateral malleolus of left fibula    Acute cerebrovascular accident (CVA) due to ischemia (HCC)       Functional progress: Improving bed mobility, static sitting balance.     Plan:  #. Acute R MCA territory ischemic stroke  - thromoembolic vs cardioembolic  - MRI Brain w/ acute R MCA infarct  - Echo with LVEF 55-60%, indeterminate diastolic dysfunction, RV dilation, negative bubble study  - ASA, clopidigrel, statin  - cardiac event monior x 30 days     #. L ankle fx  - Managed non-op per  Ortho  - NWB LLE    #. Leukocytosis, resolving  - WBCs 12.1 -> 11.5  - Afrebrile, no tachycardia  - CT A/P w/ contrast unremarkable for acute infectious process    #. Oropharyngeal Dysphagia  - SLP   - ADULT DIET; Dysphagia - Minced and Moist; 5 carb choices (75 gm/meal); Mildly Thick (Nectar); No Drinking Straws; no scrambles eggs  ADULT ORAL NUTRITION SUPPLEMENT; Breakfast, Lunch, Dinner; Diabetic Oral Supplement     #. Chronic combined systolic/diastolic heart failure  - BB, Entresto, spironolactone, Lasix  - daily weights     #. Aspiration PNA, impriving  - initially managed with IV Zosyn, will complete additional 10 days of PO Augmentin (EOT 2/26)  - Duonebs TID per RT protocol     #. DM2, last A1c  6.1%, insulin dependent  - Lantus 25 units daily w/ breakfast  - med intensity SSI TID ac + hs  - resume home metformin 1000 mg BID     Bowels: Per protocol  Bladder: Per protocol   Pain: Tylenol 650 mg q4h PRN   DVT PPx: Lovenox 40 mg daily   Follow-ups: Neurology, Ortho, Cardiology, PCP  ELOS: 22 days    Ottoniel Greene MD 2/20/2024, 5:26 PM    * This document was created using dictation software.  While all precautions were taken to ensure accuracy, errors may have occurred.  Please disregard any typographical errors.

## 2024-02-20 NOTE — PROGRESS NOTES
[facility] - Inpatient Rehabilitation Department   Phone: [phone]    Occupational Therapy    [] Initial Evaluation            [x] Daily Treatment Note         [] Discharge Summary      Patient: Rissa Becerra   : 1950   MRN: [id number]   Date of Service:  2024    Admitting Diagnosis:  Acute cerebrovascular accident (CVA) due to ischemia (HCC)  Current Admission Summary: Rissa Becerra is a 73 y.o. female with PMHx notable for HTN, HLD, type 2 diabetes mellitus, CHF, recent R MCA stroke (seen at OSH, started on DAPT and discharged home), who presented on 24 with worsening facial droop, left visual field loss, left sided weakness. She was ambulating with device, with minimal left sided weakness at time of prior hospital discharge, gradually developed worsening weakness resulting in several falls, including one causing L ankle fracture. She returned to [facility] ED. CT Head showed R frontal hypoattenuation. CTA Head/Neck showed L PCA occlusion, R M2 and M3 segmental narrowing. MRI Brain showed R MCA territory infarct. Echo with LVEF 55-60%, negative bubble study. Neurology was consulted, recommending DAPT, statin, cardiac event monitor. Orthopedics Concomitant injuries included: L ankle fracture, for which Ortho was consulted, recommending NWB LLE and non-op management in the context of recent stroke. Hospital course complicated by: aspiration pneumonia, L shoulder pain (CT L shoulder negative for fracture), urinary retention.      Patient reports that she is doing well today. She denies any fevers, chills, chest pain, shortness of breath. Endorses stable L visual field deficits, dysarthria, dysphagia, left sided weakness. Endorses left ankle pain. Endorses urinary retention, requiring straight cath since Kilgore catheter discontinued.      Past Medical History:  has a past medical history of Arthropathy, Asthma, Bell's palsy, Bronchitis, Diabetes mellitus (HCC), Diverticulitis,  have R pusher syndrome; no AROM of L UE, PROM of elbow, wrist and digits are WFL and pain free with stretching       Subjective  General: Pt in recliner at entry, pt's Dtr present. Pt is pleasant and cooperative throughout. Pt w/ difficulty attending to (L) visual field, intermittent assist to turn head during portions of BADL completion  Pain: Patient unable to rate secondary to cognition deficits. Did not express pain other than stretching in certain positions   Pain Interventions: repositioned         Activities of Daily Living  Basic Activities of Daily Living  Grooming Comments: Deoderant applications MaxA EOB  Upper Extremity Bathing: dependent  Bathing Comments: pt assisted to bed via david- RN completes marley hygiene prior to therapist entry, pt's brief clean/dry and therefore LB bathing tasks not completed. Pt sits EOB for UB bathing- PT posterior for balance and posture while OT facilitated anterior to pt and for assist to L UE for inclusion in task.   Upper Extremity Dressing: dependent  Dressing Comments: seated EOB with OT/PT assist. Assist to thread B UE however pt able to increase visual attention to L UE with dressing compared to bathing w/ VC/TC,.   General Comments: increased time for all tasks; pt does have rubio cath.   Instrumental Activities of Daily Living  No IADL completed on this date.  Functional Mobility  Bed Mobility:  Supine to Sit: 2 person assistance with ModA of 2   Rolling Left: moderate assistance  Comments: pt in recliner at entry- pt is Max to TD of 2 to reposition/scoot in recliner  Transfers:  Slide board transfer: 2 person assistance with MaxA of 2 , EOB >> w/c to pt's (L) side  Dependent transfer completed with mechanical maxisky/maximove lift system from recliner to bed and for w/c >> recliner .    Comments:in // bars pt completes 3 stances with MaxA of 3 (3rd person required for L LE support to maintain NWB status), less than 30sec each stance, pt provided with mirror  weeks  Patient will complete upper body ADL at minimal assistance   Patient will complete lower body ADL at minimal assistance   Patient will complete toileting at minimal assistance   Patient will complete functional transfers at minimal assistance   Patient will complete functional mobility at stand by assistance, in LRAD      Above goals reviewed on 2/20/2024.  All goals are ongoing at this time unless indicated above.       Therapy Session Time     Individual Group Co-treatment   Time In    0830   Time Out    0930   Minutes    60        Timed Code Treatment Minutes:   60    Total Treatment Minutes:  60       Electronically Signed By: BEVERLY Aguilera OTR/L #[id number] 2/20/2024 12:26 PM

## 2024-02-20 NOTE — PROGRESS NOTES
Pt's family reported improving speech sounds in the last week.     Cognition: Severity: Mild   Fx Recall  - Recalled physical/occupational activities completed previously in the day.  - Recalled water protocol parameters   - Insightful to her current deficits s/p CVA in relation to her baseline     Left visual attention   - mod cues required to attend to left stimuli   - verbalized understanding and awareness \"I'm having trouble paying attention to the left side\"   - encouraged family to sit and converse with pt from left     Additional Interventions:   Provided support and encouragement for evaluation of her progress within each day.           Education  Barriers To Learning: cognition and visual  Patient Education: Provided education regarding role of SLP, results of assessment, recommendations and general speech pathology plan of care.   Learning Assessment: Pt verbalized understanding   Pt requires ongoing learning     Assessment  Impairments Requiring Therapeutic Intervention: Dysarthria , Cognitive-Linguistic Deficits , and Oropharyngeal Dysphagia   Prognosis: fair    Clinical Assessment:   Pt presents with mild/moderate cognitive decline characterized by deficits in thought organization, attention, memory, and problem solving in addition to left visual neglect. The pt also presents with mild/moderate motor speech deficits related to impaired left labial weakness and incoordination resulting in decreased rate, blended word boundaries, decreased breath support, and reduced phonation/respiration coordination and impacting speech intelligibility. Left weakness is also contributing to oropharyngeal dysphagia. Pt appears to be tolerating dysphagia diet with introduction of water protocol.  Recommend speech language, cognitive linguistic, and dysphagia therapy to address deficits and facilitate safe return to prior level of function.    Diet Solids Recommendation:   Liquid Consistency Recommendation:   Recommended  Form of Meds:   Dysphagia II Minced and Moist     Mildly (nectar) thick liquids     Meds crushed as able in puree        Recommended Compensatory Swallowing Strategies: Alternate solids/liquids , Check for pocketing of food L, Upright as possible with all PO intake , No straws , Assist Feed , Small bites/sips , Lingual Sweeps , Eat/feed slowly, Remain upright 30-45 min , Aspiration Precautions , oral care following meals as appropriate (pt at risk for pocketing on L)      Plan  Frequency: 60 minutes/day; 5 days per week, as tolerated, until goals met, or discharged from ARU.  Therapeutic Interventions: Kramer Water Protocol, Oral Care, Diet Tolerance Monitoring , Patient/Family Education , Therapeutic Trials with SLP , Instrumental assessment of swallow function (Modified Barium Swallow Study)  Speech / Motor Planning / Voice intervention , Cognitive-Linguistic intervention , and Patient/ Family education   Discharge Recommendations: TBD   Continued SLP at Discharge: TBD based upon progress     Goals  Patient Goals: \"everything\"   Time Frame: 3 weeks    Pt will tolerate recommended diet and advanced trials with no overt s/s of aspiration.   Patient will demonstrate comprehension and appropriate implementation of Kramer water protocol.  Pt will improve oral motor strength and ROM for coordinated and alternating motions, via graded tasks to improve speech back to baseline level as subjectively rated by SLP/pt and family.   Patient will complete functional problem-solving tasks for daily situations with 85% accuracy or given min cues.  Pt will improve attention to detail for graded complex information to 80%, for improved recall and retention of newly learned information   Pt will complete word associative tasks to improve mental flexibility, complex thinking, and working memory skills with 80% accuracy.    Pt will improve functional visual scanning for reading comprehension and functional visual task completion with

## 2024-02-20 NOTE — PLAN OF CARE
Problem: Discharge Planning  Goal: Discharge to home or other facility with appropriate resources  Outcome: Progressing     Problem: Safety - Adult  Goal: Free from fall injury  Outcome: Progressing     Problem: Skin/Tissue Integrity  Goal: Absence of new skin breakdown  Description: 1.  Monitor for areas of redness and/or skin breakdown  2.  Assess vascular access sites hourly  3.  Every 4-6 hours minimum:  Change oxygen saturation probe site  4.  Every 4-6 hours:  If on nasal continuous positive airway pressure, respiratory therapy assess nares and determine need for appliance change or resting period.  Outcome: Progressing     Problem: ABCDS Injury Assessment  Goal: Absence of physical injury  Outcome: Progressing     Problem: Pain  Goal: Verbalizes/displays adequate comfort level or baseline comfort level  Outcome: Progressing     Problem: Nutrition Deficit:  Goal: Optimize nutritional status  Outcome: Progressing

## 2024-02-21 LAB
GLUCOSE BLD-MCNC: 188 MG/DL (ref 70–99)
GLUCOSE BLD-MCNC: 195 MG/DL (ref 70–99)
GLUCOSE BLD-MCNC: 212 MG/DL (ref 70–99)
GLUCOSE BLD-MCNC: 261 MG/DL (ref 70–99)
PERFORMED ON: ABNORMAL

## 2024-02-21 PROCEDURE — 97130 THER IVNTJ EA ADDL 15 MIN: CPT

## 2024-02-21 PROCEDURE — 97530 THERAPEUTIC ACTIVITIES: CPT

## 2024-02-21 PROCEDURE — 92526 ORAL FUNCTION THERAPY: CPT

## 2024-02-21 PROCEDURE — 6370000000 HC RX 637 (ALT 250 FOR IP): Performed by: STUDENT IN AN ORGANIZED HEALTH CARE EDUCATION/TRAINING PROGRAM

## 2024-02-21 PROCEDURE — 2580000003 HC RX 258: Performed by: STUDENT IN AN ORGANIZED HEALTH CARE EDUCATION/TRAINING PROGRAM

## 2024-02-21 PROCEDURE — 97112 NEUROMUSCULAR REEDUCATION: CPT

## 2024-02-21 PROCEDURE — 97129 THER IVNTJ 1ST 15 MIN: CPT

## 2024-02-21 PROCEDURE — 6360000002 HC RX W HCPCS: Performed by: STUDENT IN AN ORGANIZED HEALTH CARE EDUCATION/TRAINING PROGRAM

## 2024-02-21 PROCEDURE — 94640 AIRWAY INHALATION TREATMENT: CPT

## 2024-02-21 PROCEDURE — 94761 N-INVAS EAR/PLS OXIMETRY MLT: CPT

## 2024-02-21 PROCEDURE — 1280000000 HC REHAB R&B

## 2024-02-21 RX ORDER — INSULIN GLARGINE 100 [IU]/ML
30 INJECTION, SOLUTION SUBCUTANEOUS DAILY
Status: COMPLETED | OUTPATIENT
Start: 2024-02-21 | End: 2024-02-21

## 2024-02-21 RX ORDER — SIMETHICONE 80 MG
80 TABLET,CHEWABLE ORAL EVERY 6 HOURS PRN
Status: DISCONTINUED | OUTPATIENT
Start: 2024-02-21 | End: 2024-03-08 | Stop reason: HOSPADM

## 2024-02-21 RX ORDER — INSULIN GLARGINE 100 [IU]/ML
30 INJECTION, SOLUTION SUBCUTANEOUS
Status: DISCONTINUED | OUTPATIENT
Start: 2024-02-22 | End: 2024-02-21

## 2024-02-21 RX ORDER — SIMETHICONE 80 MG
80 TABLET,CHEWABLE ORAL EVERY 6 HOURS PRN
COMMUNITY

## 2024-02-21 RX ADMIN — SACUBITRIL AND VALSARTAN 1 TABLET: 24; 26 TABLET, FILM COATED ORAL at 09:52

## 2024-02-21 RX ADMIN — ASPIRIN 81 MG: 81 TABLET, COATED ORAL at 09:50

## 2024-02-21 RX ADMIN — AMOXICILLIN AND CLAVULANATE POTASSIUM 1 TABLET: 875; 125 TABLET, FILM COATED ORAL at 21:10

## 2024-02-21 RX ADMIN — ENOXAPARIN SODIUM 40 MG: 100 INJECTION SUBCUTANEOUS at 09:52

## 2024-02-21 RX ADMIN — CETIRIZINE HYDROCHLORIDE 10 MG: 10 TABLET, FILM COATED ORAL at 09:49

## 2024-02-21 RX ADMIN — CARVEDILOL 12.5 MG: 6.25 TABLET, FILM COATED ORAL at 17:09

## 2024-02-21 RX ADMIN — SIMETHICONE 80 MG: 80 TABLET, CHEWABLE ORAL at 12:13

## 2024-02-21 RX ADMIN — Medication 2 CAPSULE: at 09:52

## 2024-02-21 RX ADMIN — METFORMIN HYDROCHLORIDE 1000 MG: 500 TABLET ORAL at 17:09

## 2024-02-21 RX ADMIN — CLOPIDOGREL 75 MG: 75 TABLET, FILM COATED ORAL at 09:50

## 2024-02-21 RX ADMIN — CHOLECALCIFEROL TAB 25 MCG (1000 UNIT) 1000 UNITS: 25 TAB at 09:49

## 2024-02-21 RX ADMIN — INSULIN GLARGINE 30 UNITS: 100 INJECTION, SOLUTION SUBCUTANEOUS at 11:05

## 2024-02-21 RX ADMIN — SACUBITRIL AND VALSARTAN 1 TABLET: 24; 26 TABLET, FILM COATED ORAL at 21:09

## 2024-02-21 RX ADMIN — Medication 10 ML: at 11:14

## 2024-02-21 RX ADMIN — ATORVASTATIN CALCIUM 80 MG: 80 TABLET, FILM COATED ORAL at 21:10

## 2024-02-21 RX ADMIN — Medication 1 TABLET: at 09:56

## 2024-02-21 RX ADMIN — AMOXICILLIN AND CLAVULANATE POTASSIUM 1 TABLET: 875; 125 TABLET, FILM COATED ORAL at 09:51

## 2024-02-21 RX ADMIN — METFORMIN HYDROCHLORIDE 1000 MG: 500 TABLET ORAL at 09:50

## 2024-02-21 RX ADMIN — IPRATROPIUM BROMIDE AND ALBUTEROL SULFATE 1 DOSE: .5; 3 SOLUTION RESPIRATORY (INHALATION) at 14:39

## 2024-02-21 RX ADMIN — FUROSEMIDE 40 MG: 40 TABLET ORAL at 09:51

## 2024-02-21 RX ADMIN — SPIRONOLACTONE 12.5 MG: 25 TABLET ORAL at 09:50

## 2024-02-21 RX ADMIN — INSULIN LISPRO 4 UNITS: 100 INJECTION, SOLUTION INTRAVENOUS; SUBCUTANEOUS at 12:57

## 2024-02-21 RX ADMIN — CARVEDILOL 12.5 MG: 6.25 TABLET, FILM COATED ORAL at 09:48

## 2024-02-21 RX ADMIN — Medication 10 ML: at 21:19

## 2024-02-21 ASSESSMENT — PAIN SCALES - GENERAL: PAINLEVEL_OUTOF10: 0

## 2024-02-21 NOTE — PLAN OF CARE
Problem: Discharge Planning  Goal: Discharge to home or other facility with appropriate resources  2/21/2024 1039 by Irina Shore RN  Outcome: Progressing  Flowsheets (Taken 2/21/2024 0900)  Discharge to home or other facility with appropriate resources:   Identify barriers to discharge with patient and caregiver   Identify discharge learning needs (meds, wound care, etc)  2/20/2024 2358 by Frank Allred RN  Outcome: Progressing  Flowsheets (Taken 2/20/2024 2155)  Discharge to home or other facility with appropriate resources: Identify barriers to discharge with patient and caregiver     Problem: Safety - Adult  Goal: Free from fall injury  2/21/2024 1039 by Irina Shore RN  Outcome: Progressing  2/20/2024 2358 by Frank Allred RN  Outcome: Progressing     Problem: Skin/Tissue Integrity  Goal: Absence of new skin breakdown  Description: 1.  Monitor for areas of redness and/or skin breakdown  2.  Assess vascular access sites hourly  3.  Every 4-6 hours minimum:  Change oxygen saturation probe site  4.  Every 4-6 hours:  If on nasal continuous positive airway pressure, respiratory therapy assess nares and determine need for appliance change or resting period.  Outcome: Progressing     Problem: ABCDS Injury Assessment  Goal: Absence of physical injury  Outcome: Progressing     Problem: Pain  Goal: Verbalizes/displays adequate comfort level or baseline comfort level  Outcome: Progressing     Problem: Nutrition Deficit:  Goal: Optimize nutritional status  Outcome: Progressing

## 2024-02-21 NOTE — PROGRESS NOTES
[facility] - Inpatient Rehabilitation Department   Phone: [phone]    Occupational Therapy    [] Initial Evaluation            [x] Daily Treatment Note         [] Discharge Summary      Patient: Rissa Becerra   : 1950   MRN: [id number]   Date of Service:  2024    Admitting Diagnosis:  Acute cerebrovascular accident (CVA) due to ischemia (HCC)  Current Admission Summary: Rissa Becerra is a 73 y.o. female with PMHx notable for HTN, HLD, type 2 diabetes mellitus, CHF, recent R MCA stroke (seen at OSH, started on DAPT and discharged home), who presented on 24 with worsening facial droop, left visual field loss, left sided weakness. She was ambulating with device, with minimal left sided weakness at time of prior hospital discharge, gradually developed worsening weakness resulting in several falls, including one causing L ankle fracture. She returned to [facility] ED. CT Head showed R frontal hypoattenuation. CTA Head/Neck showed L PCA occlusion, R M2 and M3 segmental narrowing. MRI Brain showed R MCA territory infarct. Echo with LVEF 55-60%, negative bubble study. Neurology was consulted, recommending DAPT, statin, cardiac event monitor. Orthopedics Concomitant injuries included: L ankle fracture, for which Ortho was consulted, recommending NWB LLE and non-op management in the context of recent stroke. Hospital course complicated by: aspiration pneumonia, L shoulder pain (CT L shoulder negative for fracture), urinary retention.      Patient reports that she is doing well today. She denies any fevers, chills, chest pain, shortness of breath. Endorses stable L visual field deficits, dysarthria, dysphagia, left sided weakness. Endorses left ankle pain. Endorses urinary retention, requiring straight cath since Kilgore catheter discontinued.      Past Medical History:  has a past medical history of Arthropathy, Asthma, Bell's palsy, Bronchitis, Diabetes mellitus (HCC), Diverticulitis,  together assist of 2 for L UE ROM support and midline posture d/t L lateral lean). BP once in upright in tilt table was 122/67 mmHg and HR 73. Pt began to express dizziness during task- BP 84/49 mmHg HE 83, pt assisted to more reclined position on table and BP improved to 97/59 mmHg with HR 86.                Cognition  Overall Cognitive Status: Impaired  Arousal/Alertness: appropriate responses to stimuli, delayed responses to stimuli  Following Commands: follows one step commands consistently  Attention Span: attends with cues to redirect, difficulty attending to directions  Memory: decreased recall of recent events  Insights: decreased awareness of deficits  Initiation: requires cues for some  Sequencing: requires cues for some  Comments:    Orientation:    oriented to person  Command Following:   accurately follows one step commands     Education  Barriers To Learning: cognition, language, and visual  Patient Education: patient educated on goals, OT role and benefits, plan of care, precautions, ADL adaptive strategies, weight-bearing education, family education, disease specific education, transfer training, discharge recommendations  Learning Assessment:  patient verbalizes understanding, would benefit from continued reinforcement    Assessment  Activity Tolerance: Fair however significantly limited by hemiplegia and visual deficits  Impairments Requiring Therapeutic Intervention: decreased functional mobility, decreased ADL status, decreased ROM, decreased strength, decreased safety awareness, decreased cognition, decreased endurance, decreased sensation, decreased balance, decreased vision, decreased IADL, decreased fine motor control, decreased coordination, vestibular impairment, decreased posture  Prognosis: guarded  Clinical Assessment: The patient is a 73 y.o. female who presents at [facility] below their baseline level of function due to above deficits following a fall d/t worsening L sided weakness and  transfers at minimal assistance   Patient will complete functional mobility at stand by assistance, in LRAD      Above goals reviewed on 2/21/2024.  All goals are ongoing at this time unless indicated above.       Therapy Session Time     Individual Group Co-treatment   Time In    1000   Time Out    1109   Minutes    69        Timed Code Treatment Minutes:   69    Total Treatment Minutes:  69       Electronically Signed By: BEVERLY Aguilera OTR/JACQUELIN #[id number] 2/21/2024 11:44 AM

## 2024-02-21 NOTE — PLAN OF CARE
Problem: Discharge Planning  Goal: Discharge to home or other facility with appropriate resources  2/20/2024 2358 by Frank Allred RN  Outcome: Progressing  Flowsheets (Taken 2/20/2024 2155)  Discharge to home or other facility with appropriate resources: Identify barriers to discharge with patient and caregiver  2/20/2024 1031 by Irina Shore RN  Outcome: Progressing  Flowsheets (Taken 2/20/2024 1007)  Discharge to home or other facility with appropriate resources:   Identify barriers to discharge with patient and caregiver   Identify discharge learning needs (meds, wound care, etc)     Problem: Safety - Adult  Goal: Free from fall injury  2/20/2024 2358 by Frank Allred RN  Outcome: Progressing  2/20/2024 1031 by Irina Shore RN  Outcome: Progressing     Problem: Skin/Tissue Integrity  Goal: Absence of new skin breakdown  Description: 1.  Monitor for areas of redness and/or skin breakdown  2.  Assess vascular access sites hourly  3.  Every 4-6 hours minimum:  Change oxygen saturation probe site  4.  Every 4-6 hours:  If on nasal continuous positive airway pressure, respiratory therapy assess nares and determine need for appliance change or resting period.  2/20/2024 1031 by Irina Shore RN  Outcome: Progressing     Problem: ABCDS Injury Assessment  Goal: Absence of physical injury  2/20/2024 1031 by Irina Shore RN  Outcome: Progressing     Problem: Pain  Goal: Verbalizes/displays adequate comfort level or baseline comfort level  2/20/2024 1031 by Irina Shore RN  Outcome: Progressing  Flowsheets (Taken 2/20/2024 0930)  Verbalizes/displays adequate comfort level or baseline comfort level:   Encourage patient to monitor pain and request assistance   Assess pain using appropriate pain scale   Administer analgesics based on type and severity of pain and evaluate response     Problem: Nutrition Deficit:  Goal: Optimize nutritional status  2/20/2024 1031 by Irina Shore  RN  Outcome: Progressing

## 2024-02-21 NOTE — PROGRESS NOTES
Assessment completed. Patient sitting up in chair with family at side, alert and oriented x 4 and able to make all her needs known. Speech slurred at times. Denied any pain. NWB to LLE. Splint and ace wrap to left ankle. Medications were administered as ordered. POC and education reviewed with patient and mutually agreed upon. Safety interventions in place. Maxi yoseph for transfers. Kilgore in place and draining best colored urine with some sediments noted. Call light in reach. Will continue to monitor.

## 2024-02-21 NOTE — PROGRESS NOTES
Rissa Becerra  2/21/2024  [id number]    Chief Complaint: Acute cerebrovascular accident (CVA) due to ischemia (HCC)    Subjective:   Patient reports that she is doing well today.  She complains of some abdominal cramping, for which she takes as needed simethicone at home.  She denies any headache, fevers, chills, chest pain, dyspnea.  Left ankle pain is stable, tolerable.  She is feeling bored in the afternoons, wondering if additional therapy time is possible.    Last BM: Stool Occurrence: 1 (02/20/24 0813)  PRNs administered past 24h: none    Objective:  Patient Vitals for the past 24 hrs:   BP Temp Temp src Pulse Resp SpO2 Weight   02/21/24 0942 -- -- -- 77 -- -- --   02/21/24 0900 (!) 147/72 97.1 °F (36.2 °C) Oral 77 18 -- --   02/21/24 0625 -- -- -- -- -- -- 87.5 kg (193 lb)   02/20/24 2150 110/68 99.1 °F (37.3 °C) Oral 72 17 95 % --   02/20/24 1706 101/66 -- -- 79 -- -- --     Gen: No distress, pleasant.   HEENT: Normocephalic, atraumatic.   CV: Regular rate and rhythm. Extremities warm, well perfused.   Resp: No respiratory distress. CTAB.  Abd: Soft, nontender.  Ext: Left lower leg in splint with ACE wrap.  Neuro: Alert, oriented, appropriately interactive.  Flaccid left hemiparesis.    Laboratory data: Available via EMR.     Therapy progress:       PT    Supine to Sit: Dependent  Sit to Supine: Dependent   Sit to Stand:    Chair/Bed to Chair Transfer: Dependent  Car Transfer:    Ambulation 10 ft:    Ambulation 50 ft:    Ambulation 150 ft:    Stairs - 1 Step:    Stairs - 4 Step:    Stairs - 12 Step:      OT    Eating: Substantial/maximal assistance  Oral Hygiene: Dependent  Bathing: Dependent  Upper Body Dressing: Dependent  Lower Body Dressing: Dependent  Toilet Transfer:    Toilet Hygiene: Dependent    Speech Therapy    Pt presents with mild/moderate cognitive decline characterized by deficits in thought organization, attention, memory, and problem solving. The pt additionally presents with mild/moderate

## 2024-02-21 NOTE — PROGRESS NOTES
[facility] - Inpatient Rehabilitation Department   Phone: [phone]    Speech Therapy    [] Initial Evaluation            [x] Daily Treatment Note         [] Discharge Summary      Patient: Rissa Becerra   : 1950   MRN: [id number]   Date of Service:  2024  Admitting Diagnosis: Acute cerebrovascular accident (CVA) due to ischemia (HCC)  Current Admission Summary: Rissa Becerra is a 73 y.o. female with history of DM 2, CAD, chronic combined CHF, HTN, recent diagnosis of stroke at [facility] a few days came to ER with complaints of worsening L sided weakness and falls.  Patient has slurred speech and L facial droop at [facility].  She was discharged to home with home care.  Family is very supportive.  Noted she had progressing weakness and falls.  Today, noted to have flaccid LUE/LLE.  Brought to ER for evaluation.  No CP, SOB, HA or dizziness.  Patient is awake and answering questions.  , daughters and son are at bedside with her today.  Otherwise complete ROS is negative unless listed above.   Past Medical History:  has a past medical history of Arthropathy, Asthma, Bell's palsy, Bronchitis, Diabetes mellitus (HCC), Diverticulitis, Hyperlipidemia, Hypertension, Migraine, Polyneuropathy in diabetes ([facility]), and Vertigo.  Past Surgical History:  has a past surgical history that includes hernia repair; Carpal tunnel release; Total knee arthroplasty; Rotator cuff repair; and Percutaneous Transluminal Coronary Angio (2023).  Recent Chest xray: Decreased perihilar airspace disease on the right   Recent MRI Brain: 1. Scattered areas of acute infarct within the right MCA territory.  No  significant mass effect or midline shift.  2. Otherwise, no acute intracranial abnormality.  3. Mild to moderate global parenchymal volume loss with mild chronic  microvascular ischemic changes.  Instrumental Swallow Study: Modified Barium Swallow evaluation completed on 2024.Patient  blueberry muffin)   - moderately prolonged mastication with reduced bolus cohesion and control on the left side, mild anterior bolus loss; required extensive time   - required mod verbal cues for strategies to propel pocketed material from the left sulcus     Provided ongoing education regarding dysphagia, specified aspiration precautions (positioning - midline, upright positioning in chair, small single sips, isolated trials of upgraded textures with SLP). Pt and family verbalized understanding.     Pts daughter reported most difficulty with ground meats, seems to chew for a long time, requires assistance to clear, and appears to fatigue with extensive chewing was required     Expressive Language: Severity: Mild   Not directly targeted     Motor Speech: Severity: Mild  and Moderate   Not directly targeted     Cognition: Severity: Mild   Fx Recall  - Recalled physical/occupational activities completed previously in the day.  - Recalled planned tasks for therapy this date (use of NMES and trials of coffee for breakfast)      Left visual attention   - mod cues required to attend to left stimuli during meal (identifying utensils, location of foods, location of person assisting with meal)  - max cues for interpretation of external aids (reading time on analog, date and day of week on digital clock)   Pt demonstrates awareness of left neglect (I need to focus on the left) but requires mod-max verbal cues. Provided education regarding strategies to increase stimulation towards the left (placing mirror at midline during the meal, using right hand to trace to the left when using bedside table, finding most left sided placement of objects such as clock to read left-right).   Provided pt with book/paper felipe to aid with completion of table top paper tasks or to aid in cell phone use.     Additional Interventions:   Provided support and encouragement regarding stroke recovery.   Pt's daughter reported pt's desire to be busy

## 2024-02-21 NOTE — PATIENT CARE CONFERENCE
[facility]  Inpatient Rehabilitation  Weekly Team Conference Note    Patient Name: Rissa Becerra        MRN: [id number]    : 1950  (73 y.o.)  Gender: female           The team conference for this patient was held on 24 at 1100 am and led by:  Ottoniel Greene MD     CASE MANAGEMENT:  Assessment:   Patient is a 73 year old female who admitted to ARU on 2024 with the admitting diagnosis of  Acute cerebrovascular accident (CVA) due to ischemia (HCC)Patient lives with her spouse and they reside in a one story home. Spouse is able to assist the patient in the home. Patient has 7 children.  Children provide support and care. 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 Patient also gives support in clerical/office needs.  Patient has not been active with any community support programs but is interested in being referred to recommended resources. Patient would benefit from skilled PT/OT/SLP to promote increased safety and independence in order to return to her prior level of functioning. Patient's discharge plan will be determined with patient's progress.      PHYSICAL THERAPY:    Bed Mobility:  Supine to Sit: 2 person assistance with mod (A) of 2   Rolling Left: moderate assistance  Comments: Assist for LE positioning and guidance of arm to HR  Transfers:  Sit to stand transfer: 2 person assistance with max (A) of 2 in // bars for sit  <=> stand transition in addition to third therapist holding (L) LE off ground to maintain WB restrictions   Stand to sit transfer: 2 person assistance with max (A) of 2   Slide board transfer: 2 person assistance with max (A) of 2 for completion of bed => chair transfer   Dependent transfer completed with mechanical maxisky/maximove lift system from recliner to bed and from w/c to recliner.  Comments:   Ambulation:  Ambulation not tested on this date secondary to extensive assist required  cues during functional activity.  Long Term Goals:  Time Frame: three weeks  Patient will complete upper body ADL at minimal assistance   Patient will complete lower body ADL at minimal assistance   Patient will complete toileting at minimal assistance   Patient will complete functional transfers at minimal assistance   Patient will complete functional mobility at stand by assistance, in LRAD       Above goals reviewed on 2/21/2024.  All goals are ongoing at this time unless indicated above.      These goals were reviewed with this patient at the time of assessment and Rissa Becerra is in agreement    Plan of Care:  Pt to be seen 5 out of 7 days per week per ARU protocol ( 60 minutes with OT)     NUTRITION:  Weight - Scale: 88 kg (194 lb) / Body mass index is 35.48 kg/m².    Diet:ADULT DIET; Dysphagia - Minced and Moist; 5 carb choices (75 gm/meal); Mildly Thick (Nectar); No Drinking Straws; no scrambles eggs  ADULT ORAL NUTRITION SUPPLEMENT; Breakfast, Lunch, Dinner; Diabetic Oral Supplement    Po intake 1-25% x 1 meal documented.  RN reports pt taking 50% of meals typically, with about 50% ONS. Please see nutrition note for details.    NURSING:    Risk for Readmission: 17%    Brower Fall Risk Score: High  Wounds/Incisions/Ulcers: Left ankle fracture and abrasions  Medication Review: Reviewed daily with patient  Pain: Denies  Consultations/Labs/X-rays: Labs Monday and Thursday    Patient/Family Education provided by team:    Discharge Plan   Estimated Length of Stay:15 days  Destination: Home  Pass:No  Services at Discharge: OT/PT  Equipment at Discharge: TBD pending progress   Factors facilitating achievement of predicted outcomes: Family support, Cooperative, and Pleasant  Barriers to the achievement of predicted outcomes: Cognitive deficit and severe L UE/LE impairments following CVA; severe L inattention, (L) LE NWB    Patient Goals:  Get self to bathroom on toilet, \"everything\".     Interdisciplinary  Individualized Plan of Care Review of Previous Week:    Medical and functional progress towards goals:  Medically the patient is doing well, adjusting insulin due to increased blood sugars, pain well controlled, with some neuropathic pain, will possibly add nerve pain meds.  Slow progress at this time. Pt requiring 2 person for all BADL tasks. Pt is tolerating sessions well and was able to stand with assistance of tilt table and 2 therapist for ~10min. Requires dependent lift equipment and/or 2 person extensive slide board use for transfers.  Non ambulatory at this time. Pt is consuming a modified diet of minced and moist with mildly thick liquids but is progressing with advanced solids and thin liquids for potential diet upgrade. Pt is responsive to NMES via vital stim to maximize oral and pharyngeal movement/strengthening in order to enhance ability for diet upgrade. Mild-moderate cognitive deficits persist with pt progressing toward attention problem solving and visual scanning goals.   Barriers towards progress: severe (L) inattention, (L) UE/LE deficits following CVA, (L) LE NWB status, mild-moderate cognitive deficits   Interventions to address Barriers:  ADL completion with incorporation of midline posturing and HOHA for use of L UE, WB in L UE, strengthening of R UE/LE, static standing activities, slide board training, w/c mobility, NMES via Vital Stim, cognitive training   Goals still appropriate:  Yes  Modifications to goals: None  Continue Current Plan of Care:  Yes  Modifications to Plan of Care: None    Rehab Team Members in attendance for Team Conference:  Shanell Grover, MSW, LSW    Adrianne Mehta, RD, LD    Manav Escoto, OTR/L    Josh Avitia, PT, DPT    James Dhaliwal M.A., CCC-SLP    Irina Shore, JEANINE Barajas PT, DPT,     I, the Rehab Physician, led the above team conference and agree with all decisions made, and approve the established interdisciplinary plan of care as

## 2024-02-21 NOTE — PROGRESS NOTES
[facility] - Inpatient Rehabilitation Department   Phone: [phone]    Physical Therapy    [] Initial Evaluation            [x] Daily Treatment Note         [] Discharge Summary      Patient: Rissa Becerra   : 1950   MRN: [id number]   Date of Service:  2024  Admitting Diagnosis: Acute cerebrovascular accident (CVA) due to ischemia (HCC)  Current Admission Summary: Rissa Becerra is a 73 y.o. female with PMHx notable for HTN, HLD, type 2 diabetes mellitus, CHF, recent R MCA stroke (seen at OSH, started on DAPT and discharged home), who presented on 24 with worsening facial droop, left visual field loss, left sided weakness. She was ambulating with device, with minimal left sided weakness at time of prior hospital discharge, gradually developed worsening weakness resulting in several falls, including one causing L ankle fracture. She returned to [facility] ED. CT Head showed R frontal hypoattenuation. CTA Head/Neck showed L PCA occlusion, R M2 and M3 segmental narrowing. MRI Brain showed R MCA territory infarct. Neurology was consulted, recommending DAPT, statin, cardiac event monitor. Orthopedics Concomitant injuries included: L ankle fracture, for which Ortho was consulted, recommending NWB LLE and non-op management in the context of recent stroke. Hospital course complicated by: aspiration pneumonia.   Past Medical History:  has a past medical history of Arthropathy, Asthma, Bell's palsy, Bronchitis, Diabetes mellitus (HCC), Diverticulitis, Hyperlipidemia, Hypertension, Migraine, Polyneuropathy in diabetes ([facility]), and Vertigo.  Past Surgical History:  has a past surgical history that includes hernia repair; Carpal tunnel release; Total knee arthroplasty; Rotator cuff repair; and Percutaneous Transluminal Coronary Angio (2023).  Discharge Recommendations: To be determined with progress   DME Required For Discharge: DME to be determined pending patient  redirect  Insights: decreased awareness of deficits  Initiation: requires cues for some  Sequencing: requires cues for some  Command Following:   accurately follows one step commands    Education  Barriers To Learning: cognition and visual  Patient Education: patient educated on goals, PT role and benefits, plan of care, general safety, functional mobility training, proper use of assistive device/equipment, midline orientation  Learning Assessment:  patient verbalizes understanding, would benefit from continued reinforcement    Assessment  Activity Tolerance: tolerates all attempted activities with mild c/o low back pain which resolves with seated rest breaks  Impairments Requiring Therapeutic Intervention: decreased functional mobility, decreased ADL status, decreased ROM, decreased strength, decreased safety awareness, decreased cognition, decreased endurance, decreased sensation, decreased balance, decreased vision, increased pain, decreased posture  Prognosis: fair  Clinical Assessment: Patient continues to present with severe functional deficits but and continues to require extensive assist of 2 for all functional transfers and remains non-ambulatory.  Pt continues to be limited by NWB status, hemiplegia, and pusher syndrome presentation in both seated and standing position.  Tilt table use initiated on this date to allow extended standing position, completing ~ 10 minutes prior to return to supine position due to orthostatic hypotension.  Prior to hospitalization patient was independent in home and community.    Safety Interventions: patient left in chair, chair alarm in place, call light within reach, gait belt, and family/caregiver present    Plan  Frequency: 5 x/week, 60 min/day  Current Treatment Recommendations: strengthening, ROM, balance training, functional mobility training, transfer training, gait training, stair training, endurance training, neuromuscular re-education, wheelchair mobility training,

## 2024-02-22 LAB
ANION GAP SERPL CALCULATED.3IONS-SCNC: 11 MMOL/L (ref 3–16)
BUN SERPL-MCNC: 38 MG/DL (ref 7–20)
CALCIUM SERPL-MCNC: 9.5 MG/DL (ref 8.3–10.6)
CHLORIDE SERPL-SCNC: 98 MMOL/L (ref 99–110)
CO2 SERPL-SCNC: 26 MMOL/L (ref 21–32)
CREAT SERPL-MCNC: 0.8 MG/DL (ref 0.6–1.2)
GFR SERPLBLD CREATININE-BSD FMLA CKD-EPI: >60 ML/MIN/{1.73_M2}
GLUCOSE BLD-MCNC: 178 MG/DL (ref 70–99)
GLUCOSE BLD-MCNC: 190 MG/DL (ref 70–99)
GLUCOSE BLD-MCNC: 198 MG/DL (ref 70–99)
GLUCOSE BLD-MCNC: 249 MG/DL (ref 70–99)
GLUCOSE SERPL-MCNC: 185 MG/DL (ref 70–99)
PERFORMED ON: ABNORMAL
POTASSIUM SERPL-SCNC: 4.1 MMOL/L (ref 3.5–5.1)
SODIUM SERPL-SCNC: 135 MMOL/L (ref 136–145)

## 2024-02-22 PROCEDURE — 6370000000 HC RX 637 (ALT 250 FOR IP): Performed by: STUDENT IN AN ORGANIZED HEALTH CARE EDUCATION/TRAINING PROGRAM

## 2024-02-22 PROCEDURE — 2580000003 HC RX 258: Performed by: STUDENT IN AN ORGANIZED HEALTH CARE EDUCATION/TRAINING PROGRAM

## 2024-02-22 PROCEDURE — 97112 NEUROMUSCULAR REEDUCATION: CPT

## 2024-02-22 PROCEDURE — 97535 SELF CARE MNGMENT TRAINING: CPT

## 2024-02-22 PROCEDURE — 97530 THERAPEUTIC ACTIVITIES: CPT

## 2024-02-22 PROCEDURE — 92526 ORAL FUNCTION THERAPY: CPT

## 2024-02-22 PROCEDURE — 94640 AIRWAY INHALATION TREATMENT: CPT

## 2024-02-22 PROCEDURE — 1280000000 HC REHAB R&B

## 2024-02-22 PROCEDURE — 97129 THER IVNTJ 1ST 15 MIN: CPT

## 2024-02-22 PROCEDURE — 6360000002 HC RX W HCPCS: Performed by: STUDENT IN AN ORGANIZED HEALTH CARE EDUCATION/TRAINING PROGRAM

## 2024-02-22 PROCEDURE — 97130 THER IVNTJ EA ADDL 15 MIN: CPT

## 2024-02-22 PROCEDURE — 80048 BASIC METABOLIC PNL TOTAL CA: CPT

## 2024-02-22 PROCEDURE — 36415 COLL VENOUS BLD VENIPUNCTURE: CPT

## 2024-02-22 RX ORDER — INSULIN GLARGINE 100 [IU]/ML
34 INJECTION, SOLUTION SUBCUTANEOUS
Status: DISCONTINUED | OUTPATIENT
Start: 2024-02-22 | End: 2024-02-23

## 2024-02-22 RX ORDER — DULOXETIN HYDROCHLORIDE 20 MG/1
20 CAPSULE, DELAYED RELEASE ORAL DAILY
Status: DISCONTINUED | OUTPATIENT
Start: 2024-02-23 | End: 2024-02-26

## 2024-02-22 RX ADMIN — IPRATROPIUM BROMIDE AND ALBUTEROL SULFATE 1 DOSE: .5; 3 SOLUTION RESPIRATORY (INHALATION) at 05:20

## 2024-02-22 RX ADMIN — CARVEDILOL 12.5 MG: 6.25 TABLET, FILM COATED ORAL at 08:19

## 2024-02-22 RX ADMIN — Medication 10 ML: at 20:07

## 2024-02-22 RX ADMIN — FUROSEMIDE 40 MG: 40 TABLET ORAL at 08:19

## 2024-02-22 RX ADMIN — CETIRIZINE HYDROCHLORIDE 10 MG: 10 TABLET, FILM COATED ORAL at 08:19

## 2024-02-22 RX ADMIN — INSULIN GLARGINE 34 UNITS: 100 INJECTION, SOLUTION SUBCUTANEOUS at 10:47

## 2024-02-22 RX ADMIN — AMOXICILLIN AND CLAVULANATE POTASSIUM 1 TABLET: 875; 125 TABLET, FILM COATED ORAL at 08:19

## 2024-02-22 RX ADMIN — ENOXAPARIN SODIUM 40 MG: 100 INJECTION SUBCUTANEOUS at 08:18

## 2024-02-22 RX ADMIN — SACUBITRIL AND VALSARTAN 1 TABLET: 24; 26 TABLET, FILM COATED ORAL at 08:19

## 2024-02-22 RX ADMIN — Medication 2 CAPSULE: at 08:20

## 2024-02-22 RX ADMIN — CHOLECALCIFEROL TAB 25 MCG (1000 UNIT) 1000 UNITS: 25 TAB at 08:18

## 2024-02-22 RX ADMIN — METFORMIN HYDROCHLORIDE 1000 MG: 500 TABLET ORAL at 16:49

## 2024-02-22 RX ADMIN — SPIRONOLACTONE 12.5 MG: 25 TABLET ORAL at 08:20

## 2024-02-22 RX ADMIN — METFORMIN HYDROCHLORIDE 1000 MG: 500 TABLET ORAL at 08:19

## 2024-02-22 RX ADMIN — CARVEDILOL 12.5 MG: 6.25 TABLET, FILM COATED ORAL at 16:50

## 2024-02-22 RX ADMIN — IPRATROPIUM BROMIDE AND ALBUTEROL SULFATE 1 DOSE: .5; 3 SOLUTION RESPIRATORY (INHALATION) at 12:14

## 2024-02-22 RX ADMIN — SACUBITRIL AND VALSARTAN 1 TABLET: 24; 26 TABLET, FILM COATED ORAL at 20:07

## 2024-02-22 RX ADMIN — ASPIRIN 81 MG: 81 TABLET, COATED ORAL at 08:19

## 2024-02-22 RX ADMIN — AMOXICILLIN AND CLAVULANATE POTASSIUM 1 TABLET: 875; 125 TABLET, FILM COATED ORAL at 20:07

## 2024-02-22 RX ADMIN — CLOPIDOGREL 75 MG: 75 TABLET, FILM COATED ORAL at 08:19

## 2024-02-22 RX ADMIN — Medication 1 TABLET: at 08:20

## 2024-02-22 RX ADMIN — Medication 10 ML: at 08:26

## 2024-02-22 RX ADMIN — ACETAMINOPHEN 650 MG: 325 TABLET ORAL at 22:03

## 2024-02-22 RX ADMIN — INSULIN LISPRO 2 UNITS: 100 INJECTION, SOLUTION INTRAVENOUS; SUBCUTANEOUS at 13:12

## 2024-02-22 RX ADMIN — ATORVASTATIN CALCIUM 80 MG: 80 TABLET, FILM COATED ORAL at 20:07

## 2024-02-22 ASSESSMENT — PAIN - FUNCTIONAL ASSESSMENT: PAIN_FUNCTIONAL_ASSESSMENT: ACTIVITIES ARE NOT PREVENTED

## 2024-02-22 ASSESSMENT — PAIN SCALES - GENERAL
PAINLEVEL_OUTOF10: 0
PAINLEVEL_OUTOF10: 0
PAINLEVEL_OUTOF10: 6

## 2024-02-22 ASSESSMENT — PAIN DESCRIPTION - LOCATION: LOCATION: BACK

## 2024-02-22 ASSESSMENT — PAIN DESCRIPTION - ORIENTATION: ORIENTATION: LOWER

## 2024-02-22 ASSESSMENT — PAIN DESCRIPTION - DESCRIPTORS: DESCRIPTORS: ACHING;SHARP

## 2024-02-22 ASSESSMENT — PAIN SCALES - WONG BAKER: WONGBAKER_NUMERICALRESPONSE: 0

## 2024-02-22 NOTE — PROGRESS NOTES
Rissa Becerra  2/22/2024  [id number]    Chief Complaint: Acute cerebrovascular accident (CVA) due to ischemia (HCC)    Subjective:   Patient reports that she is doing well today.  She endorses some painful paresthesia involving her left upper and lower extremities.  She notes a prior intolerance to gabapentin, but would be open to trying a different neuropathic pain agent to address this issue.  She denies any fevers, chills, chest pain, shortness of breath.    Last BM: Stool Occurrence: 1 (02/22/24 0511)  PRNs administered past 24h: none    Objective:  Patient Vitals for the past 24 hrs:   BP Temp Temp src Pulse Resp SpO2 Weight   02/22/24 0814 115/68 98.2 °F (36.8 °C) Oral 62 16 93 % --   02/22/24 0800 122/68 98.5 °F (36.9 °C) Oral 71 16 93 % --   02/22/24 0600 -- -- -- -- -- -- 88 kg (194 lb)   02/21/24 2045 (!) 105/55 99.5 °F (37.5 °C) Oral 76 18 96 % --   02/21/24 1709 (!) 145/87 -- -- 81 -- -- --     Gen: No distress, pleasant.   HEENT: Normocephalic, atraumatic.   CV: Regular rate and rhythm. Extremities warm, well perfused.   Resp: No respiratory distress. CTAB.  Abd: Soft, nontender.  Ext: Left lower leg in splint with ACE wrap.  Neuro: Alert, oriented, appropriately interactive.  Flaccid left hemiparesis.    Laboratory data: Available via EMR.     Therapy progress:       PT    Supine to Sit: Dependent  Sit to Supine: Dependent   Sit to Stand:    Chair/Bed to Chair Transfer: Dependent  Car Transfer:    Ambulation 10 ft:    Ambulation 50 ft:    Ambulation 150 ft:    Stairs - 1 Step:    Stairs - 4 Step:    Stairs - 12 Step:      OT    Eating: Substantial/maximal assistance  Oral Hygiene: Dependent  Bathing: Dependent  Upper Body Dressing: Dependent  Lower Body Dressing: Dependent  Toilet Transfer:    Toilet Hygiene: Dependent    Speech Therapy    Pt presents with mild/moderate cognitive decline characterized by deficits in thought organization, attention, memory, and problem solving in addition to left  ASA, clopidigrel, statin  - cardiac event monior x 30 days     #. L ankle fx  - Managed non-op per Ortho  - NWB LLE    #. Leukocytosis, resolving  - WBCs 12.1 -> 11.5  - Afrebrile, no tachycardia  - CT A/P w/ contrast unremarkable for acute infectious process    #. Oropharyngeal Dysphagia  - SLP   - ADULT DIET; Dysphagia - Minced and Moist; 5 carb choices (75 gm/meal); Mildly Thick (Nectar); No Drinking Straws; no scrambles eggs  ADULT ORAL NUTRITION SUPPLEMENT; Breakfast, Lunch, Dinner; Diabetic Oral Supplement     #. Chronic combined systolic/diastolic heart failure  - BB, Entresto, spironolactone, Lasix  - daily weights     #. Aspiration PNA, impriving  - initially managed with IV Zosyn, will complete additional 10 days of PO Augmentin (EOT 2/26)  - Duonebs TID per RT protocol     #. DM2, last A1c  6.1%, insulin dependent  - Lantus 34 units daily w/ breakfast  - med intensity SSI TID ac + hs  - continue home metformin 1000 mg BID     Bowels: Per protocol.  Simethicone as needed for cramping, bloating, flatulence.  Bladder: Per protocol   Pain: Tylenol 650 mg q4h PRN, duloxetine 20 mg daily for post-stroke neuropathic pain  DVT PPx: Lovenox 40 mg daily   Follow-ups: Neurology, Ortho, Cardiology, PCP  ELOS: 22 days    Interdisciplinary team conference was held today with entire rehab treatment team including PT, OT, SLP (if applicable), Dietician, RN, and SW. Discussion focused on progress toward rehab goals and discharge planning. Making progress. Barriers include lack of motor recovery, left inattention. We as a medical team, and I as the physician , made a plan to work on these barriers to facilitate safe discharge. Plan will be presented to patient/family (if available).       Ottoniel Greene MD 2/22/2024, 2:55 PM    * This document was created using dictation software.  While all precautions were taken to ensure accuracy, errors may have occurred.  Please disregard any typographical errors.

## 2024-02-22 NOTE — PROGRESS NOTES
[facility] - Inpatient Rehabilitation Department   Phone: [phone]    Occupational Therapy    [] Initial Evaluation            [x] Daily Treatment Note         [] Discharge Summary      Patient: Rissa Becerra   : 1950   MRN: [id number]   Date of Service:  2024    Admitting Diagnosis:  Acute cerebrovascular accident (CVA) due to ischemia (HCC)  Current Admission Summary: Rissa Becerra is a 73 y.o. female with PMHx notable for HTN, HLD, type 2 diabetes mellitus, CHF, recent R MCA stroke (seen at OSH, started on DAPT and discharged home), who presented on 24 with worsening facial droop, left visual field loss, left sided weakness. She was ambulating with device, with minimal left sided weakness at time of prior hospital discharge, gradually developed worsening weakness resulting in several falls, including one causing L ankle fracture. She returned to [facility] ED. CT Head showed R frontal hypoattenuation. CTA Head/Neck showed L PCA occlusion, R M2 and M3 segmental narrowing. MRI Brain showed R MCA territory infarct. Echo with LVEF 55-60%, negative bubble study. Neurology was consulted, recommending DAPT, statin, cardiac event monitor. Orthopedics Concomitant injuries included: L ankle fracture, for which Ortho was consulted, recommending NWB LLE and non-op management in the context of recent stroke. Hospital course complicated by: aspiration pneumonia, L shoulder pain (CT L shoulder negative for fracture), urinary retention.      Patient reports that she is doing well today. She denies any fevers, chills, chest pain, shortness of breath. Endorses stable L visual field deficits, dysarthria, dysphagia, left sided weakness. Endorses left ankle pain. Endorses urinary retention, requiring straight cath since Kilgore catheter discontinued.      Past Medical History:  has a past medical history of Arthropathy, Asthma, Bell's palsy, Bronchitis, Diabetes mellitus (HCC), Diverticulitis,

## 2024-02-22 NOTE — PROGRESS NOTES
[facility] - Inpatient Rehabilitation Department   Phone: [phone]    Physical Therapy    [] Initial Evaluation            [x] Daily Treatment Note         [] Discharge Summary      Patient: Rissa Becerra   : 1950   MRN: [id number]   Date of Service:  2024  Admitting Diagnosis: Acute cerebrovascular accident (CVA) due to ischemia (HCC)  Current Admission Summary: Rissa Becerra is a 73 y.o. female with PMHx notable for HTN, HLD, type 2 diabetes mellitus, CHF, recent R MCA stroke (seen at OSH, started on DAPT and discharged home), who presented on 24 with worsening facial droop, left visual field loss, left sided weakness. She was ambulating with device, with minimal left sided weakness at time of prior hospital discharge, gradually developed worsening weakness resulting in several falls, including one causing L ankle fracture. She returned to [facility] ED. CT Head showed R frontal hypoattenuation. CTA Head/Neck showed L PCA occlusion, R M2 and M3 segmental narrowing. MRI Brain showed R MCA territory infarct. Neurology was consulted, recommending DAPT, statin, cardiac event monitor. Orthopedics Concomitant injuries included: L ankle fracture, for which Ortho was consulted, recommending NWB LLE and non-op management in the context of recent stroke. Hospital course complicated by: aspiration pneumonia.   Past Medical History:  has a past medical history of Arthropathy, Asthma, Bell's palsy, Bronchitis, Diabetes mellitus (HCC), Diverticulitis, Hyperlipidemia, Hypertension, Migraine, Polyneuropathy in diabetes ([facility]), and Vertigo.  Past Surgical History:  has a past surgical history that includes hernia repair; Carpal tunnel release; Total knee arthroplasty; Rotator cuff repair; and Percutaneous Transluminal Coronary Angio (2023).  Discharge Recommendations: To be determined with progress   DME Required For Discharge: DME to be determined pending patient

## 2024-02-22 NOTE — CARE COORDINATION
Team conference held today. Spoke with patient and her family to discuss progress with therapy, barriers to discharge, and plans to return home. Estimated discharge date set for 3/8/2024. Patient's discharge plan to be determine with progress. Will continue to follow for support and discharge planning.     Electronically signed by ERI BRYSON on 2/22/2024 at 12:50 PM

## 2024-02-22 NOTE — PLAN OF CARE
Problem: Discharge Planning  Goal: Discharge to home or other facility with appropriate resources  2/22/2024 1246 by Irina Shore RN  Outcome: Progressing  Flowsheets (Taken 2/22/2024 0828)  Discharge to home or other facility with appropriate resources:   Identify barriers to discharge with patient and caregiver   Identify discharge learning needs (meds, wound care, etc)  2/21/2024 2325 by Katia Cyr RN  Outcome: Progressing     Problem: Safety - Adult  Goal: Free from fall injury  2/22/2024 1246 by Irina Shore RN  Outcome: Progressing  2/21/2024 2325 by Katia Cyr RN  Outcome: Progressing     Problem: Skin/Tissue Integrity  Goal: Absence of new skin breakdown  Description: 1.  Monitor for areas of redness and/or skin breakdown  2.  Assess vascular access sites hourly  3.  Every 4-6 hours minimum:  Change oxygen saturation probe site  4.  Every 4-6 hours:  If on nasal continuous positive airway pressure, respiratory therapy assess nares and determine need for appliance change or resting period.  2/22/2024 1246 by Irina Shore RN  Outcome: Progressing  2/21/2024 2325 by Katia Cyr RN  Outcome: Progressing     Problem: ABCDS Injury Assessment  Goal: Absence of physical injury  Outcome: Progressing     Problem: Pain  Goal: Verbalizes/displays adequate comfort level or baseline comfort level  Outcome: Progressing  Flowsheets (Taken 2/22/2024 0814)  Verbalizes/displays adequate comfort level or baseline comfort level:   Encourage patient to monitor pain and request assistance   Assess pain using appropriate pain scale   Administer analgesics based on type and severity of pain and evaluate response     Problem: Nutrition Deficit:  Goal: Optimize nutritional status  Outcome: Progressing     Problem: Chronic Conditions and Co-morbidities  Goal: Patient's chronic conditions and co-morbidity symptoms are monitored and maintained or improved  2/22/2024 1246 by Irina Shore  RN  Outcome: Progressing  2/21/2024 2325 by Katia Cyr, RN  Outcome: Progressing

## 2024-02-22 NOTE — PLAN OF CARE
Problem: Discharge Planning  Goal: Discharge to home or other facility with appropriate resources  2/21/2024 2325 by Katia Cyr RN  Outcome: Progressing     Problem: Safety - Adult  Goal: Free from fall injury  2/21/2024 2325 by Katia Cyr RN  Outcome: Progressing     Problem: Skin/Tissue Integrity  Goal: Absence of new skin breakdown  Description: 1.  Monitor for areas of redness and/or skin breakdown  2.  Assess vascular access sites hourly  3.  Every 4-6 hours minimum:  Change oxygen saturation probe site  4.  Every 4-6 hours:  If on nasal continuous positive airway pressure, respiratory therapy assess nares and determine need for appliance change or resting period.  2/21/2024 2325 by Katia Cyr RN  Outcome: Progressing     Problem: Chronic Conditions and Co-morbidities  Goal: Patient's chronic conditions and co-morbidity symptoms are monitored and maintained or improved  Outcome: Progressing

## 2024-02-22 NOTE — PROGRESS NOTES
[facility] - Inpatient Rehabilitation Department   Phone: [phone]    Speech Therapy    [] Initial Evaluation            [x] Daily Treatment Note         [] Discharge Summary      Patient: Rissa Becerra   : 1950   MRN: [id number]   Date of Service:  2024  Admitting Diagnosis: Acute cerebrovascular accident (CVA) due to ischemia (HCC)  Current Admission Summary: Rissa Becerra is a 73 y.o. female with history of DM 2, CAD, chronic combined CHF, HTN, recent diagnosis of stroke at [facility] a few days came to ER with complaints of worsening L sided weakness and falls.  Patient has slurred speech and L facial droop at [facility].  She was discharged to home with home care.  Family is very supportive.  Noted she had progressing weakness and falls.  Today, noted to have flaccid LUE/LLE.  Brought to ER for evaluation.  No CP, SOB, HA or dizziness.  Patient is awake and answering questions.  , daughters and son are at bedside with her today.  Otherwise complete ROS is negative unless listed above.   Past Medical History:  has a past medical history of Arthropathy, Asthma, Bell's palsy, Bronchitis, Diabetes mellitus (HCC), Diverticulitis, Hyperlipidemia, Hypertension, Migraine, Polyneuropathy in diabetes ([facility]), and Vertigo.  Past Surgical History:  has a past surgical history that includes hernia repair; Carpal tunnel release; Total knee arthroplasty; Rotator cuff repair; and Percutaneous Transluminal Coronary Angio (2023).  Recent Chest xray: Decreased perihilar airspace disease on the right   Recent MRI Brain: 1. Scattered areas of acute infarct within the right MCA territory.  No  significant mass effect or midline shift.  2. Otherwise, no acute intracranial abnormality.  3. Mild to moderate global parenchymal volume loss with mild chronic  microvascular ischemic changes.  Instrumental Swallow Study: Modified Barium Swallow evaluation completed on 2024.Patient  demonstrated ability to effectively modify patient meals as needed.     Session 1:   Dysphagia: Severity: Moderate   Trials of thin liquid via cup (hot temperature) x 15   - increase in episodes of anterior bolus loss, suspect in relation to positioning in bed   - pt independently feeding, but needs constant reminders to take small sips   - no overt coughing, throat clearing or presence of a wet vocal quality noted across trials  -increased short breathing during conference/cognitive tasks, questioning relation to liquid trials via cup vs general SOB    Expressive Language: Severity: Mild   Not directly targeted     Motor Speech: Severity: Mild  and Moderate   Not directly targeted     Cognition: Severity: Mild   Visual scanning/executive function  - determining errors in pillbox: Pt required max cues to attenuate to left side of page. Will continue to work on thought organization/planning aspect of task, as well as L side tracking with functional tasks   - maze trail (tracing sequence of numbers in progressive order): Pt required max cues to visually track numbers in a sequence. SLP put yellow highlight on the left side of page to increase attention on left side. Required hand over hand assist for tracing sequence.     Additional Interventions:         Education  Barriers To Learning: cognition and visual  Patient Education: Provided education regarding role of SLP, results of assessment, recommendations and general speech pathology plan of care.   Learning Assessment: Pt verbalized understanding   Pt requires ongoing learning     Assessment  Impairments Requiring Therapeutic Intervention: Dysarthria , Cognitive-Linguistic Deficits , and Oropharyngeal Dysphagia   Prognosis: fair    Clinical Assessment:  Pt presents with mild/moderate cognitive decline characterized by deficits in thought organization, attention, memory, and problem solving in addition to left visual neglect. The pt also presents with mild/moderate  overt s/s of aspiration.   Ongoing  Patient will demonstrate comprehension and appropriate implementation of Kramer water protocol.   Ongoing  Pt will improve oral motor strength and ROM for coordinated and alternating motions, via graded tasks to improve speech back to baseline level as subjectively rated by SLP/pt and family.    Ongoing  Patient will complete functional problem-solving tasks for daily situations with 85% accuracy or given min cues.   Ongoing  Pt will improve attention to detail for graded complex information to 80%, for improved recall and retention of newly learned information   Ongoing  Pt will complete word associative tasks to improve mental flexibility, complex thinking, and working memory skills with 80% accuracy.     Ongoing  Pt will improve functional visual scanning for reading comprehension and functional visual task completion with incorporation of compensatory strategies, to mild cues    Ongoing      Therapy Session Time      Session 1 Session 2   Time In 1000 1125   Time Out 1033 1155   Time Code Minutes 10 20   Individual Minutes 33 30     Timed Code Treatment Minutes: 30  Total Treatment Minutes:  63    Electronically Signed By:   SONYA Cox  Speech-Language Pathology Graduate Clinician    The speech-language pathologist was present, directed the patient's care, made skilled judgment and was responsible for assessment and treatment.     James Dhaliwal M.A., CCC-SLP [id number]  Speech-Language Pathologist

## 2024-02-23 LAB
GLUCOSE BLD-MCNC: 150 MG/DL (ref 70–99)
GLUCOSE BLD-MCNC: 153 MG/DL (ref 70–99)
GLUCOSE BLD-MCNC: 178 MG/DL (ref 70–99)
GLUCOSE BLD-MCNC: 181 MG/DL (ref 70–99)
PERFORMED ON: ABNORMAL

## 2024-02-23 PROCEDURE — 94640 AIRWAY INHALATION TREATMENT: CPT

## 2024-02-23 PROCEDURE — 97535 SELF CARE MNGMENT TRAINING: CPT

## 2024-02-23 PROCEDURE — 97530 THERAPEUTIC ACTIVITIES: CPT

## 2024-02-23 PROCEDURE — 97130 THER IVNTJ EA ADDL 15 MIN: CPT

## 2024-02-23 PROCEDURE — 92526 ORAL FUNCTION THERAPY: CPT

## 2024-02-23 PROCEDURE — 6360000002 HC RX W HCPCS: Performed by: STUDENT IN AN ORGANIZED HEALTH CARE EDUCATION/TRAINING PROGRAM

## 2024-02-23 PROCEDURE — 6370000000 HC RX 637 (ALT 250 FOR IP): Performed by: STUDENT IN AN ORGANIZED HEALTH CARE EDUCATION/TRAINING PROGRAM

## 2024-02-23 PROCEDURE — 97129 THER IVNTJ 1ST 15 MIN: CPT

## 2024-02-23 PROCEDURE — 92507 TX SP LANG VOICE COMM INDIV: CPT

## 2024-02-23 PROCEDURE — 1280000000 HC REHAB R&B

## 2024-02-23 PROCEDURE — 97110 THERAPEUTIC EXERCISES: CPT

## 2024-02-23 RX ORDER — INSULIN GLARGINE 100 [IU]/ML
36 INJECTION, SOLUTION SUBCUTANEOUS
Status: DISCONTINUED | OUTPATIENT
Start: 2024-02-24 | End: 2024-03-01

## 2024-02-23 RX ADMIN — ACETAMINOPHEN 650 MG: 325 TABLET ORAL at 20:51

## 2024-02-23 RX ADMIN — INSULIN GLARGINE 34 UNITS: 100 INJECTION, SOLUTION SUBCUTANEOUS at 08:29

## 2024-02-23 RX ADMIN — DULOXETINE HYDROCHLORIDE 20 MG: 20 CAPSULE, DELAYED RELEASE ORAL at 08:28

## 2024-02-23 RX ADMIN — CLOPIDOGREL 75 MG: 75 TABLET, FILM COATED ORAL at 08:27

## 2024-02-23 RX ADMIN — CARVEDILOL 12.5 MG: 6.25 TABLET, FILM COATED ORAL at 08:28

## 2024-02-23 RX ADMIN — ASPIRIN 81 MG: 81 TABLET, COATED ORAL at 08:27

## 2024-02-23 RX ADMIN — METFORMIN HYDROCHLORIDE 1000 MG: 500 TABLET ORAL at 17:21

## 2024-02-23 RX ADMIN — ATORVASTATIN CALCIUM 80 MG: 80 TABLET, FILM COATED ORAL at 20:47

## 2024-02-23 RX ADMIN — CETIRIZINE HYDROCHLORIDE 10 MG: 10 TABLET, FILM COATED ORAL at 08:27

## 2024-02-23 RX ADMIN — SACUBITRIL AND VALSARTAN 1 TABLET: 24; 26 TABLET, FILM COATED ORAL at 20:47

## 2024-02-23 RX ADMIN — CARVEDILOL 12.5 MG: 6.25 TABLET, FILM COATED ORAL at 17:21

## 2024-02-23 RX ADMIN — IPRATROPIUM BROMIDE AND ALBUTEROL SULFATE 1 DOSE: .5; 3 SOLUTION RESPIRATORY (INHALATION) at 04:31

## 2024-02-23 RX ADMIN — Medication 2 CAPSULE: at 08:26

## 2024-02-23 RX ADMIN — SACUBITRIL AND VALSARTAN 1 TABLET: 24; 26 TABLET, FILM COATED ORAL at 08:28

## 2024-02-23 RX ADMIN — METFORMIN HYDROCHLORIDE 1000 MG: 500 TABLET ORAL at 08:27

## 2024-02-23 RX ADMIN — FUROSEMIDE 40 MG: 40 TABLET ORAL at 08:27

## 2024-02-23 RX ADMIN — ENOXAPARIN SODIUM 40 MG: 100 INJECTION SUBCUTANEOUS at 08:28

## 2024-02-23 RX ADMIN — CHOLECALCIFEROL TAB 25 MCG (1000 UNIT) 1000 UNITS: 25 TAB at 08:27

## 2024-02-23 RX ADMIN — AMOXICILLIN AND CLAVULANATE POTASSIUM 1 TABLET: 875; 125 TABLET, FILM COATED ORAL at 08:26

## 2024-02-23 RX ADMIN — Medication 1 TABLET: at 08:40

## 2024-02-23 RX ADMIN — SPIRONOLACTONE 12.5 MG: 25 TABLET ORAL at 08:27

## 2024-02-23 RX ADMIN — AMOXICILLIN AND CLAVULANATE POTASSIUM 1 TABLET: 875; 125 TABLET, FILM COATED ORAL at 20:47

## 2024-02-23 ASSESSMENT — PAIN DESCRIPTION - ORIENTATION
ORIENTATION: LEFT
ORIENTATION: ANTERIOR

## 2024-02-23 ASSESSMENT — PAIN SCALES - GENERAL
PAINLEVEL_OUTOF10: 3
PAINLEVEL_OUTOF10: 3
PAINLEVEL_OUTOF10: 7

## 2024-02-23 ASSESSMENT — PAIN SCALES - WONG BAKER
WONGBAKER_NUMERICALRESPONSE: 0
WONGBAKER_NUMERICALRESPONSE: 0

## 2024-02-23 ASSESSMENT — PAIN DESCRIPTION - LOCATION
LOCATION: HEAD
LOCATION: SHOULDER

## 2024-02-23 ASSESSMENT — PAIN DESCRIPTION - FREQUENCY: FREQUENCY: CONTINUOUS

## 2024-02-23 ASSESSMENT — PAIN - FUNCTIONAL ASSESSMENT
PAIN_FUNCTIONAL_ASSESSMENT: ACTIVITIES ARE NOT PREVENTED
PAIN_FUNCTIONAL_ASSESSMENT: ACTIVITIES ARE NOT PREVENTED

## 2024-02-23 ASSESSMENT — PAIN DESCRIPTION - DESCRIPTORS
DESCRIPTORS: ACHING
DESCRIPTORS: ACHING

## 2024-02-23 NOTE — PROGRESS NOTES
[facility] - Inpatient Rehabilitation Department   Phone: [phone]    Physical Therapy    [] Initial Evaluation            [x] Daily Treatment Note         [] Discharge Summary      Patient: Rissa Becerra   : 1950   MRN: [id number]   Date of Service:  2024  Admitting Diagnosis: Acute cerebrovascular accident (CVA) due to ischemia (HCC)  Current Admission Summary: Rissa Becerra is a 73 y.o. female with PMHx notable for HTN, HLD, type 2 diabetes mellitus, CHF, recent R MCA stroke (seen at OSH, started on DAPT and discharged home), who presented on 24 with worsening facial droop, left visual field loss, left sided weakness. She was ambulating with device, with minimal left sided weakness at time of prior hospital discharge, gradually developed worsening weakness resulting in several falls, including one causing L ankle fracture. She returned to [facility] ED. CT Head showed R frontal hypoattenuation. CTA Head/Neck showed L PCA occlusion, R M2 and M3 segmental narrowing. MRI Brain showed R MCA territory infarct. Neurology was consulted, recommending DAPT, statin, cardiac event monitor. Orthopedics Concomitant injuries included: L ankle fracture, for which Ortho was consulted, recommending NWB LLE and non-op management in the context of recent stroke. Hospital course complicated by: aspiration pneumonia.   Past Medical History:  has a past medical history of Arthropathy, Asthma, Bell's palsy, Bronchitis, Diabetes mellitus (HCC), Diverticulitis, Hyperlipidemia, Hypertension, Migraine, Polyneuropathy in diabetes ([facility]), and Vertigo.  Past Surgical History:  has a past surgical history that includes hernia repair; Carpal tunnel release; Total knee arthroplasty; Rotator cuff repair; and Percutaneous Transluminal Coronary Angio (2023).  Discharge Recommendations: To be determined with progress   DME Required For Discharge: DME to be determined pending patient  orientation provided throughout task in addition to trunk support.  Patient transitioned to recliner with mechanical lift.  Upon return to room, pt remains up in recliner with chair alarm and call light within reach.         Functional Outcomes                 Cognition  Overall Cognitive Status: Impaired  Arousal/Alertness: appropriate responses to stimuli  Following Commands: follows one step commands consistently  Attention Span: attends with cues to redirect  Insights: decreased awareness of deficits  Initiation: requires cues for some  Sequencing: requires cues for some  Command Following:   accurately follows one step commands    Education  Barriers To Learning: cognition and visual  Patient Education: patient educated on goals, PT role and benefits, plan of care, general safety, functional mobility training, proper use of assistive device/equipment, disease specific education, pressure relief, midline orientation  Learning Assessment:  patient verbalizes understanding, would benefit from continued reinforcement    Assessment  Activity Tolerance: tolerates all attempted activities with mild c/o low back pain which resolves with seated rest breaks  Impairments Requiring Therapeutic Intervention: decreased functional mobility, decreased ADL status, decreased ROM, decreased strength, decreased safety awareness, decreased cognition, decreased endurance, decreased sensation, decreased balance, decreased vision, increased pain, decreased posture  Prognosis: fair  Clinical Assessment: Patient continues to present with severe functional deficits but and continues to require extensive assist of 2 for all functional transfers with frequent use of mechanical lift equipmentand remains non-ambulatory.  Pt continues to be limited by NWB status, hemiplegia, and pusher syndrome presentation in both seated and standing position.  On this date patient noted to have active (L) hip AROM in partial ROM against gravity including

## 2024-02-23 NOTE — PROGRESS NOTES
[facility] - Inpatient Rehabilitation Department   Phone: [phone]    Speech Therapy    [] Initial Evaluation            [x] Daily Treatment Note         [] Discharge Summary      Patient: Rissa Becerra   : 1950   MRN: [id number]   Date of Service:  2024  Admitting Diagnosis: Acute cerebrovascular accident (CVA) due to ischemia (HCC)  Current Admission Summary: Rissa Becerra is a 73 y.o. female with history of DM 2, CAD, chronic combined CHF, HTN, recent diagnosis of stroke at [facility] a few days came to ER with complaints of worsening L sided weakness and falls.  Patient has slurred speech and L facial droop at [facility].  She was discharged to home with home care.  Family is very supportive.  Noted she had progressing weakness and falls.  Today, noted to have flaccid LUE/LLE.  Brought to ER for evaluation.  No CP, SOB, HA or dizziness.  Patient is awake and answering questions.  , daughters and son are at bedside with her today.  Otherwise complete ROS is negative unless listed above.   Past Medical History:  has a past medical history of Arthropathy, Asthma, Bell's palsy, Bronchitis, Diabetes mellitus (HCC), Diverticulitis, Hyperlipidemia, Hypertension, Migraine, Polyneuropathy in diabetes ([facility]), and Vertigo.  Past Surgical History:  has a past surgical history that includes hernia repair; Carpal tunnel release; Total knee arthroplasty; Rotator cuff repair; and Percutaneous Transluminal Coronary Angio (2023).  Recent Chest xray: Decreased perihilar airspace disease on the right   Recent MRI Brain: 1. Scattered areas of acute infarct within the right MCA territory.  No  significant mass effect or midline shift.  2. Otherwise, no acute intracranial abnormality.  3. Mild to moderate global parenchymal volume loss with mild chronic  microvascular ischemic changes.  Instrumental Swallow Study: Modified Barium Swallow evaluation completed on 2024.Patient

## 2024-02-23 NOTE — PLAN OF CARE
Problem: Discharge Planning  Goal: Discharge to home or other facility with appropriate resources  2/23/2024 0848 by Irina Shore RN  Outcome: Progressing  Flowsheets (Taken 2/23/2024 0841)  Discharge to home or other facility with appropriate resources:   Identify barriers to discharge with patient and caregiver   Identify discharge learning needs (meds, wound care, etc)  2/23/2024 0140 by Katia Cyr RN  Outcome: Progressing     Problem: Safety - Adult  Goal: Free from fall injury  2/23/2024 0848 by Irina Shore RN  Outcome: Progressing  2/23/2024 0140 by Katia Cyr RN  Outcome: Progressing     Problem: Skin/Tissue Integrity  Goal: Absence of new skin breakdown  Description: 1.  Monitor for areas of redness and/or skin breakdown  2.  Assess vascular access sites hourly  3.  Every 4-6 hours minimum:  Change oxygen saturation probe site  4.  Every 4-6 hours:  If on nasal continuous positive airway pressure, respiratory therapy assess nares and determine need for appliance change or resting period.  2/23/2024 0848 by Irina Shore RN  Outcome: Progressing  2/23/2024 0140 by Katia Cyr RN  Outcome: Progressing     Problem: ABCDS Injury Assessment  Goal: Absence of physical injury  2/23/2024 0848 by Irina Shore RN  Outcome: Progressing  2/23/2024 0140 by Katia Cyr RN  Outcome: Progressing     Problem: Nutrition Deficit:  Goal: Optimize nutritional status  Outcome: Progressing     Problem: Pain  Goal: Verbalizes/displays adequate comfort level or baseline comfort level  2/23/2024 0848 by Irina Shore RN  Outcome: Progressing  Flowsheets (Taken 2/23/2024 0800)  Verbalizes/displays adequate comfort level or baseline comfort level:   Encourage patient to monitor pain and request assistance   Assess pain using appropriate pain scale   Administer analgesics based on type and severity of pain and evaluate response  2/23/2024 0140 by Katia Cyr RN  Outcome:  Progressing     Problem: Chronic Conditions and Co-morbidities  Goal: Patient's chronic conditions and co-morbidity symptoms are monitored and maintained or improved  2/23/2024 0848 by Irina Shore, RN  Outcome: Progressing  2/23/2024 0140 by Katia Cyr, RN  Outcome: Progressing

## 2024-02-23 NOTE — PROGRESS NOTES
Assessment completed. Patient up in bed, alert and oriented x 4 with some slurred speech but able to make all her needs known. C/o some left shoulder pain. [place] in place and draining clear yellow colored urine. Assist for all meals. Medications administered as ordered. POC and education reviewed with patient and mutually agreed upon. Safety interventions in place. [place] for transfers. Safety interventions in place. Family at bedside. Call light in reach. Will continue to monitor.

## 2024-02-23 NOTE — PROGRESS NOTES
Hyperlipidemia, Hypertension, Migraine, Polyneuropathy in diabetes (HCC), and Vertigo.  Past Surgical History:  has a past surgical history that includes hernia repair; Carpal tunnel release; Total knee arthroplasty; Rotator cuff repair; and Percutaneous Transluminal Coronary Angio (05/2023).    Discharge Recommendations: ongoing OT services, level TBD pending progress    DME Required For Discharge: DME to be determined pending patient progress    Precautions/Restrictions: high fall risk, weight bearing  Weight Bearing Restrictions: non weight bearing LEFT mildly displaced distal fibula fracture   [] Right Upper Extremity        [] Left Upper Extremity         [] Right Lower Extremity         [x] Left Lower Extremity             Required Braces/Orthotics:  Short leg splint                   [] Right            [x] Left  Positional Restrictions:no positional restrictions    Pre-Admission Information   Lives With: spouse                  Type of Home: house  Home Layout: one level  Home Access:  2 step to enter without rails   Bathroom Layout: tub/shower unit  Bathroom Equipment: grab bars in shower, hand held shower head  Toilet Height: standard height  Home Equipment: rolling walker, single point cane  Transfer Assistance: modified independent with use of SPC  Ambulation Assistance:modified independent with use of SPC  ADL Assistance: independent with all ADL's  IADL Assistance: independent with homemaking tasks  Active :        [x] Yes                 [] No  Hand Dominance: [] Left                 [x] Right  Current Employment: retired.    Recent Falls: Pt reports 6 falls since last admission.       Vision (from 2/17 eval):   Vision Gross Assessment: Impaired and Vision Corrective Device: wears glasses for reading   Decreased visual tracking to L side , L visual neglect  Comments: Assume L field cut however would benefit from formal testing to delineate from L visual neglect vs. L field cut    Motor: Pt does  required for toileting; it's noted that pt demo's intermittent movement in L LE, not always volitional in nature however was able to assist minimally in abduction and hip flex during dressing bed level.  Pt limited by L hemiplegia, severe L visual inattention (Possible L field cut), impaired balance, impaired gross motor coordination, and deficits in cognition. Skilled OT warranted to improve safety and independence in occupational pursuits in order to promote return to PLOF    Safety Interventions: patient left in chair, call light within reach, patient at risk for falls, and family/caregiver present       Plan  Frequency: 5 x/week, 60 min/day  Current Treatment Recommendations: strengthening, ROM, balance training, functional mobility training, transfer training, endurance training, neuromuscular re-education, wheelchair mobility training, modalities, patient/caregiver education, ADL/self-care training, IADL training, pain management, home exercise program, safety education, equipment evaluation/education, and positioning  Goals  Patient Goals: go to the bathroom on the toilet   Short Term Goals:  Time Frame: two weeks  Patient will complete toileting at maximum assistance   Patient will complete functional transfers at maximum assistance   Patient will increase functional sitting balance to CGA for improved ADL completion  Patient will complete bed mobility at minimal assistance   Patient will attend to L visual field with min verbal cues during functional activity.  Long Term Goals:  Time Frame: three weeks  Patient will complete upper body ADL at minimal assistance   Patient will complete lower body ADL at minimal assistance   Patient will complete toileting at minimal assistance   Patient will complete functional transfers at minimal assistance   Patient will complete functional mobility at stand by assistance, in LRAD      Above goals reviewed on 2/23/2024.  All goals are ongoing at this time unless indicated  above.       Therapy Session Time     Individual Group Co-treatment   Time In    0830   Time Out    0930   Minutes    60      Second Session Therapy Time:   Individual Concurrent Group Co-treatment   Time In        1317   Time Out        1434   Minutes        77       Timed Code Treatment Minutes: 60 + 77     Total Treatment Minutes:  137       Electronically Signed By: BEVERLY Aguilera OTR/JACQUELIN #[id number] 2/23/2024 3:36 PM

## 2024-02-23 NOTE — PROGRESS NOTES
Rissa Becerra  2/23/2024  [id number]    Chief Complaint: Acute cerebrovascular accident (CVA) due to ischemia (HCC)    Subjective:   Patient reports she is doing well today.  Continues to endorse painful paresthesia involving the left arm and leg.  She is agreeable to trying duloxetine for this.  Denies any fevers, chills, chest pain, shortness of breath.     Last BM: Stool Occurrence: 1 (02/23/24 0600)  PRNs administered past 24h: Tylenol    Objective:  Patient Vitals for the past 24 hrs:   BP Temp Temp src Pulse Resp SpO2 Weight   02/23/24 0800 129/72 98.2 °F (36.8 °C) Oral 75 17 92 % --   02/23/24 0431 -- -- -- -- -- 94 % --   02/23/24 0330 -- -- -- -- -- -- 87.5 kg (193 lb)   02/22/24 2000 (!) 100/55 98.9 °F (37.2 °C) Oral 69 -- 93 % --   02/22/24 1650 130/80 -- -- 71 -- -- --     Gen: No distress, pleasant.   HEENT: Normocephalic, atraumatic.   CV: Regular rate and rhythm. Extremities warm, well perfused.   Resp: No respiratory distress. CTAB.  Abd: Soft, nontender.  Ext: Left lower leg in splint with ACE wrap.  Neuro: Alert, oriented, appropriately interactive.  Flaccid left hemiparesis.    Laboratory data: Available via EMR.     Therapy progress:       PT    Supine to Sit: Dependent  Sit to Supine: Dependent   Sit to Stand:    Chair/Bed to Chair Transfer: Dependent  Car Transfer:    Ambulation 10 ft:    Ambulation 50 ft:    Ambulation 150 ft:    Stairs - 1 Step:    Stairs - 4 Step:    Stairs - 12 Step:      OT    Eating: Substantial/maximal assistance  Oral Hygiene: Dependent  Bathing: Dependent  Upper Body Dressing: Dependent  Lower Body Dressing: Dependent  Toilet Transfer: Dependent  Toilet Hygiene: Dependent    Speech Therapy    Pt presents with mild/moderate cognitive decline characterized by deficits in thought organization, attention, memory, and problem solving in addition to left visual neglect. The pt also presents with mild/moderate motor speech deficits related to impaired left labial weakness  and incoordination resulting in decreased rate, blended word boundaries, decreased breath support, and reduced phonation/respiration coordination and impacting speech intelligibility. Left weakness is also contributing to oropharyngeal dysphagia however pt has been responsive to NMES via Vital Stim to improve oropharyngeal function. Pt appears to be tolerating dysphagia diet with introduction of water protocol and trials of thin liquids. Plan for ongoing trials of thin liquids and advanced solids prior to diet upgrade. Recommend speech language, cognitive linguistic, and dysphagia therapy to address deficits and facilitate safe return to prior level of function.    Body mass index is 35.3 kg/m².    Assessment:    This patient continues to require an ARU level of care from all disciplines to address the following issues:    Patient Active Problem List   Diagnosis    Localized edema    Diabetic polyneuropathy (HCC)    Acute CVA (cerebrovascular accident) ([facility])    Left hemiplegia (HCC)    Dysphagia    CAD (coronary artery disease)    DM2 (diabetes mellitus, type 2) ([facility])    HTN (hypertension), benign    Obesity (BMI 30-39.9)    Acute left-sided weakness    Arterial ischemic stroke, ICA, right, acute (HCC)    DM (diabetes mellitus), secondary, with complications ([facility])    Dyslipidemia    Closed displaced fracture of lateral malleolus of left fibula    Acute cerebrovascular accident (CVA) due to ischemia (HCC)       Functional progress: Therapy is noting some volitional control of left leg.    Plan:  #. Acute R MCA territory ischemic stroke  - thromoembolic vs cardioembolic  - MRI Brain w/ acute R MCA infarct  - Echo with LVEF 55-60%, indeterminate diastolic dysfunction, RV dilation, negative bubble study  - ASA, clopidigrel, statin  - cardiac event monior x 30 days     #. L ankle fx  - Managed non-op per Ortho  - NWB LLE    #. Leukocytosis, resolving  - WBCs 12.1 -> 11.5  - Afrebrile, no tachycardia  - CT A/P w/ contrast

## 2024-02-23 NOTE — PROGRESS NOTES
Nutrition Note    RECOMMENDATIONS  PO Diet: continue dysphagia minded and moist / CCC-5  ONS: Glucerna TID     NUTRITION ASSESSMENT   Nutrition follow up: pt continues on a dysphagia minced and moist diet / CCC-5.  After working with SLP today, mildly thick liquids were upgraded to thin.  Pt reports eating okay, she is excited to consume liquids with out thickener and ready to consume thin Glucerna.  Pt also has oral nutrition supplements from home in her room.  Encouraged pt to consume supplements.     Nutrition Related Findings: slurred speech  Wounds: None  Nutrition Education:  Education not indicated   Nutrition Goals: PO intake 50% or greater, prior to discharge     MALNUTRITION ASSESSMENT      Malnutrition Status: No malnutrition      NUTRITION DIAGNOSIS   Increased nutrient needs related to increase demand for energy/nutrients as evidenced by in ARU for strength and conditioning      CURRENT NUTRITION THERAPIES  ADULT DIET; Dysphagia - Minced and Moist; 5 carb choices (75 gm/meal); No Drinking Straws; no scrambles eggs  ADULT ORAL NUTRITION SUPPLEMENT; Breakfast, Lunch, Dinner; Diabetic Oral Supplement     PO Intake: 26-50%   PO Supplement Intake:26-50%        ANTHROPOMETRICS  Current Height: 157.5 cm (5' 2\")  Current Weight - Scale: 87.5 kg (193 lb)    Ideal Body Weight (IBW): 110 lbs  (50 kg)        BMI: 35.3      COMPARATIVE STANDARDS  Total Energy Requirements (kcals/day): 1313 - 1575     Protein (g):  60 -100       Fluid (mL/day):  1575    The patient will be monitored per nutrition standards of care. Consult dietitian if additional nutrition interventions are needed prior to RD reassessment.     PATRICIO FRANCOIS, SERGIO, LD    Contact: [phone]

## 2024-02-24 LAB
GLUCOSE BLD-MCNC: 135 MG/DL (ref 70–99)
GLUCOSE BLD-MCNC: 138 MG/DL (ref 70–99)
GLUCOSE BLD-MCNC: 151 MG/DL (ref 70–99)
GLUCOSE BLD-MCNC: 197 MG/DL (ref 70–99)
PERFORMED ON: ABNORMAL

## 2024-02-24 PROCEDURE — 1280000000 HC REHAB R&B

## 2024-02-24 PROCEDURE — 6360000002 HC RX W HCPCS: Performed by: STUDENT IN AN ORGANIZED HEALTH CARE EDUCATION/TRAINING PROGRAM

## 2024-02-24 PROCEDURE — 6370000000 HC RX 637 (ALT 250 FOR IP): Performed by: STUDENT IN AN ORGANIZED HEALTH CARE EDUCATION/TRAINING PROGRAM

## 2024-02-24 RX ADMIN — ACETAMINOPHEN 650 MG: 325 TABLET ORAL at 17:06

## 2024-02-24 RX ADMIN — CLOPIDOGREL 75 MG: 75 TABLET, FILM COATED ORAL at 09:16

## 2024-02-24 RX ADMIN — METFORMIN HYDROCHLORIDE 1000 MG: 500 TABLET ORAL at 09:14

## 2024-02-24 RX ADMIN — FUROSEMIDE 40 MG: 40 TABLET ORAL at 09:15

## 2024-02-24 RX ADMIN — CHOLECALCIFEROL TAB 25 MCG (1000 UNIT) 1000 UNITS: 25 TAB at 09:15

## 2024-02-24 RX ADMIN — ENOXAPARIN SODIUM 40 MG: 100 INJECTION SUBCUTANEOUS at 09:16

## 2024-02-24 RX ADMIN — SACUBITRIL AND VALSARTAN 1 TABLET: 24; 26 TABLET, FILM COATED ORAL at 09:15

## 2024-02-24 RX ADMIN — ATORVASTATIN CALCIUM 80 MG: 80 TABLET, FILM COATED ORAL at 20:05

## 2024-02-24 RX ADMIN — CARVEDILOL 12.5 MG: 6.25 TABLET, FILM COATED ORAL at 17:06

## 2024-02-24 RX ADMIN — CETIRIZINE HYDROCHLORIDE 10 MG: 10 TABLET, FILM COATED ORAL at 09:14

## 2024-02-24 RX ADMIN — ASPIRIN 81 MG: 81 TABLET, COATED ORAL at 09:14

## 2024-02-24 RX ADMIN — INSULIN GLARGINE 36 UNITS: 100 INJECTION, SOLUTION SUBCUTANEOUS at 09:22

## 2024-02-24 RX ADMIN — SACUBITRIL AND VALSARTAN 1 TABLET: 24; 26 TABLET, FILM COATED ORAL at 20:06

## 2024-02-24 RX ADMIN — METFORMIN HYDROCHLORIDE 1000 MG: 500 TABLET ORAL at 17:06

## 2024-02-24 RX ADMIN — Medication 1 TABLET: at 09:16

## 2024-02-24 RX ADMIN — ACETAMINOPHEN 650 MG: 325 TABLET ORAL at 20:06

## 2024-02-24 RX ADMIN — SPIRONOLACTONE 12.5 MG: 25 TABLET ORAL at 09:15

## 2024-02-24 RX ADMIN — CARVEDILOL 12.5 MG: 6.25 TABLET, FILM COATED ORAL at 09:14

## 2024-02-24 RX ADMIN — DULOXETINE HYDROCHLORIDE 20 MG: 20 CAPSULE, DELAYED RELEASE ORAL at 09:15

## 2024-02-24 RX ADMIN — Medication 2 CAPSULE: at 09:14

## 2024-02-24 ASSESSMENT — PAIN SCALES - GENERAL
PAINLEVEL_OUTOF10: 3
PAINLEVEL_OUTOF10: 1
PAINLEVEL_OUTOF10: 2
PAINLEVEL_OUTOF10: 0
PAINLEVEL_OUTOF10: 3

## 2024-02-24 ASSESSMENT — PAIN DESCRIPTION - LOCATION
LOCATION: HEAD
LOCATION: HEAD

## 2024-02-24 ASSESSMENT — PAIN DESCRIPTION - ONSET: ONSET: ON-GOING

## 2024-02-24 ASSESSMENT — PAIN DESCRIPTION - ORIENTATION
ORIENTATION: MID;ANTERIOR
ORIENTATION: MID;ANTERIOR

## 2024-02-24 ASSESSMENT — PAIN DESCRIPTION - DESCRIPTORS
DESCRIPTORS: ACHING
DESCRIPTORS: ACHING

## 2024-02-24 ASSESSMENT — PAIN - FUNCTIONAL ASSESSMENT: PAIN_FUNCTIONAL_ASSESSMENT: ACTIVITIES ARE NOT PREVENTED

## 2024-02-24 ASSESSMENT — PAIN DESCRIPTION - FREQUENCY: FREQUENCY: CONTINUOUS

## 2024-02-24 NOTE — PROGRESS NOTES
Rissa Becerra  2/24/2024  [id number]    Chief Complaint: Acute cerebrovascular accident (CVA) due to ischemia (HCC)    Subjective:   Patient reports she is feeling better overall today. She had a headache last evening, had trouble falling asleep initially but slept well after that. Denies any other new complaints today.     Last BM: Stool Occurrence: 1 (02/24/24 0625)  PRNs administered past 24h: Tylenol    Objective:  Patient Vitals for the past 24 hrs:   BP Temp Temp src Pulse Resp SpO2 Weight   02/24/24 0910 132/77 97.6 °F (36.4 °C) Oral 67 16 94 % --   02/24/24 0600 -- -- -- -- -- -- 87.6 kg (193 lb 1.6 oz)   02/23/24 2030 112/69 97.6 °F (36.4 °C) Oral 70 18 93 % --   02/23/24 1721 138/62 -- -- 69 -- -- --     Gen: No distress, pleasant.   HEENT: Normocephalic, atraumatic.   CV: Regular rate and rhythm. Extremities warm, well perfused.   Resp: No respiratory distress. CTAB.  Abd: Soft, nontender.  Ext: Left lower leg in splint with ACE wrap.  Neuro: Alert, oriented, appropriately interactive.  Flaccid left hemiparesis.    Laboratory data: Available via EMR.     Therapy progress:       PT    Supine to Sit: Dependent  Sit to Supine: Dependent   Sit to Stand:    Chair/Bed to Chair Transfer: Dependent  Car Transfer:    Ambulation 10 ft:    Ambulation 50 ft:    Ambulation 150 ft:    Stairs - 1 Step:    Stairs - 4 Step:    Stairs - 12 Step:      OT    Eating: Substantial/maximal assistance  Oral Hygiene: Dependent  Bathing: Dependent  Upper Body Dressing: Substantial/maximal assistance  Lower Body Dressing: Dependent  Toilet Transfer: Dependent  Toilet Hygiene: Dependent    Speech Therapy    Pt presents with mild/moderate cognitive decline characterized by deficits in thought organization, attention, memory, and problem solving in addition to left visual neglect. The pt also presents with mild/moderate motor speech deficits related to impaired left labial weakness and incoordination resulting in decreased rate,

## 2024-02-24 NOTE — PLAN OF CARE
Problem: Discharge Planning  Goal: Discharge to home or other facility with appropriate resources  2/24/2024 1114 by Irina Shore RN  Outcome: Progressing  Flowsheets (Taken 2/24/2024 0928)  Discharge to home or other facility with appropriate resources:   Identify barriers to discharge with patient and caregiver   Identify discharge learning needs (meds, wound care, etc)  2/23/2024 2234 by Katia Cyr RN  Outcome: Progressing     Problem: Safety - Adult  Goal: Free from fall injury  2/24/2024 1114 by Irina Shore RN  Outcome: Progressing  2/23/2024 2234 by Katia Cyr RN  Outcome: Progressing     Problem: Skin/Tissue Integrity  Goal: Absence of new skin breakdown  Description: 1.  Monitor for areas of redness and/or skin breakdown  2.  Assess vascular access sites hourly  3.  Every 4-6 hours minimum:  Change oxygen saturation probe site  4.  Every 4-6 hours:  If on nasal continuous positive airway pressure, respiratory therapy assess nares and determine need for appliance change or resting period.  2/24/2024 1114 by Irina Shore RN  Outcome: Progressing  2/23/2024 2234 by Katia Cyr RN  Outcome: Progressing     Problem: ABCDS Injury Assessment  Goal: Absence of physical injury  2/24/2024 1114 by Irina Shore RN  Outcome: Progressing  2/23/2024 2234 by Katia Cyr RN  Outcome: Progressing     Problem: Pain  Goal: Verbalizes/displays adequate comfort level or baseline comfort level  2/24/2024 1114 by Irina Shore RN  Outcome: Progressing  Flowsheets (Taken 2/24/2024 0910)  Verbalizes/displays adequate comfort level or baseline comfort level:   Encourage patient to monitor pain and request assistance   Assess pain using appropriate pain scale   Administer analgesics based on type and severity of pain and evaluate response  2/23/2024 2234 by Katia Cyr RN  Outcome: Progressing     Problem: Nutrition Deficit:  Goal: Optimize nutritional status  Outcome:

## 2024-02-24 NOTE — PLAN OF CARE
Problem: Discharge Planning  Goal: Discharge to home or other facility with appropriate resources  2/23/2024 2234 by Katia Cyr RN  Outcome: Progressing     Problem: Safety - Adult  Goal: Free from fall injury  2/23/2024 2234 by Katia Cyr RN  Outcome: Progressing     Problem: Skin/Tissue Integrity  Goal: Absence of new skin breakdown  Description: 1.  Monitor for areas of redness and/or skin breakdown  2.  Assess vascular access sites hourly  3.  Every 4-6 hours minimum:  Change oxygen saturation probe site  4.  Every 4-6 hours:  If on nasal continuous positive airway pressure, respiratory therapy assess nares and determine need for appliance change or resting period.  2/23/2024 2234 by Katia Cyr RN  Outcome: Progressing     Problem: ABCDS Injury Assessment  Goal: Absence of physical injury  2/23/2024 2234 by Katia Cyr RN  Outcome: Progressing     Problem: Pain  Goal: Verbalizes/displays adequate comfort level or baseline comfort level  2/23/2024 2234 by Katia Cyr RN  Outcome: Progressing     Problem: Chronic Conditions and Co-morbidities  Goal: Patient's chronic conditions and co-morbidity symptoms are monitored and maintained or improved  2/23/2024 2234 by Katia Cyr RN  Outcome: Progressing

## 2024-02-25 VITALS
DIASTOLIC BLOOD PRESSURE: 64 MMHG | OXYGEN SATURATION: 93 % | RESPIRATION RATE: 14 BRPM | BODY MASS INDEX: 34.91 KG/M2 | HEART RATE: 68 BPM | HEIGHT: 62 IN | TEMPERATURE: 98 F | WEIGHT: 189.7 LBS | SYSTOLIC BLOOD PRESSURE: 109 MMHG

## 2024-02-25 LAB
GLUCOSE BLD-MCNC: 142 MG/DL (ref 70–99)
GLUCOSE BLD-MCNC: 148 MG/DL (ref 70–99)
GLUCOSE BLD-MCNC: 153 MG/DL (ref 70–99)
GLUCOSE BLD-MCNC: 83 MG/DL (ref 70–99)
PERFORMED ON: ABNORMAL
PERFORMED ON: NORMAL

## 2024-02-25 PROCEDURE — 6360000002 HC RX W HCPCS: Performed by: STUDENT IN AN ORGANIZED HEALTH CARE EDUCATION/TRAINING PROGRAM

## 2024-02-25 PROCEDURE — 6370000000 HC RX 637 (ALT 250 FOR IP): Performed by: STUDENT IN AN ORGANIZED HEALTH CARE EDUCATION/TRAINING PROGRAM

## 2024-02-25 PROCEDURE — 1280000000 HC REHAB R&B

## 2024-02-25 RX ORDER — FUROSEMIDE 20 MG/1
20 TABLET ORAL DAILY
Status: DISCONTINUED | OUTPATIENT
Start: 2024-02-25 | End: 2024-03-08 | Stop reason: HOSPADM

## 2024-02-25 RX ADMIN — SIMETHICONE 80 MG: 80 TABLET, CHEWABLE ORAL at 13:25

## 2024-02-25 RX ADMIN — FUROSEMIDE 20 MG: 20 TABLET ORAL at 10:03

## 2024-02-25 RX ADMIN — SACUBITRIL AND VALSARTAN 1 TABLET: 24; 26 TABLET, FILM COATED ORAL at 10:04

## 2024-02-25 RX ADMIN — CHOLECALCIFEROL TAB 25 MCG (1000 UNIT) 1000 UNITS: 25 TAB at 10:03

## 2024-02-25 RX ADMIN — SPIRONOLACTONE 12.5 MG: 25 TABLET ORAL at 10:04

## 2024-02-25 RX ADMIN — ENOXAPARIN SODIUM 40 MG: 100 INJECTION SUBCUTANEOUS at 10:02

## 2024-02-25 RX ADMIN — METFORMIN HYDROCHLORIDE 1000 MG: 500 TABLET ORAL at 17:17

## 2024-02-25 RX ADMIN — ACETAMINOPHEN 650 MG: 325 TABLET ORAL at 13:25

## 2024-02-25 RX ADMIN — METFORMIN HYDROCHLORIDE 1000 MG: 500 TABLET ORAL at 10:04

## 2024-02-25 RX ADMIN — CETIRIZINE HYDROCHLORIDE 10 MG: 10 TABLET, FILM COATED ORAL at 10:04

## 2024-02-25 RX ADMIN — Medication 2 CAPSULE: at 10:04

## 2024-02-25 RX ADMIN — DULOXETINE HYDROCHLORIDE 20 MG: 20 CAPSULE, DELAYED RELEASE ORAL at 10:03

## 2024-02-25 RX ADMIN — ATORVASTATIN CALCIUM 80 MG: 80 TABLET, FILM COATED ORAL at 19:43

## 2024-02-25 RX ADMIN — CLOPIDOGREL 75 MG: 75 TABLET, FILM COATED ORAL at 10:04

## 2024-02-25 RX ADMIN — CARVEDILOL 12.5 MG: 6.25 TABLET, FILM COATED ORAL at 10:04

## 2024-02-25 RX ADMIN — SACUBITRIL AND VALSARTAN 1 TABLET: 24; 26 TABLET, FILM COATED ORAL at 19:43

## 2024-02-25 RX ADMIN — CARVEDILOL 12.5 MG: 6.25 TABLET, FILM COATED ORAL at 17:17

## 2024-02-25 RX ADMIN — ASPIRIN 81 MG: 81 TABLET, COATED ORAL at 10:03

## 2024-02-25 RX ADMIN — INSULIN GLARGINE 36 UNITS: 100 INJECTION, SOLUTION SUBCUTANEOUS at 10:03

## 2024-02-25 RX ADMIN — Medication 1 TABLET: at 10:03

## 2024-02-25 ASSESSMENT — PAIN SCALES - GENERAL
PAINLEVEL_OUTOF10: 6
PAINLEVEL_OUTOF10: 5
PAINLEVEL_OUTOF10: 0

## 2024-02-25 ASSESSMENT — PAIN DESCRIPTION - ONSET: ONSET: ON-GOING

## 2024-02-25 ASSESSMENT — PAIN DESCRIPTION - ORIENTATION: ORIENTATION: POSTERIOR

## 2024-02-25 ASSESSMENT — PAIN DESCRIPTION - DESCRIPTORS: DESCRIPTORS: ACHING;THROBBING

## 2024-02-25 ASSESSMENT — PAIN DESCRIPTION - PAIN TYPE: TYPE: ACUTE PAIN

## 2024-02-25 ASSESSMENT — PAIN DESCRIPTION - FREQUENCY: FREQUENCY: CONTINUOUS

## 2024-02-25 ASSESSMENT — PAIN - FUNCTIONAL ASSESSMENT: PAIN_FUNCTIONAL_ASSESSMENT: ACTIVITIES ARE NOT PREVENTED

## 2024-02-25 ASSESSMENT — PAIN DESCRIPTION - LOCATION: LOCATION: HEAD

## 2024-02-25 NOTE — PLAN OF CARE
Problem: Discharge Planning  Goal: Discharge to home or other facility with appropriate resources  Outcome: Progressing  Flowsheets (Taken 2/25/2024 1547)  Discharge to home or other facility with appropriate resources:   Identify barriers to discharge with patient and caregiver   Arrange for needed discharge resources and transportation as appropriate   Identify discharge learning needs (meds, wound care, etc)   Arrange for interpreters to assist at discharge as needed     Problem: Safety - Adult  Goal: Free from fall injury  Outcome: Progressing  Flowsheets (Taken 2/25/2024 1547)  Free From Fall Injury: Instruct family/caregiver on patient safety     Problem: Skin/Tissue Integrity  Goal: Absence of new skin breakdown  Description: 1.  Monitor for areas of redness and/or skin breakdown  2.  Assess vascular access sites hourly  3.  Every 4-6 hours minimum:  Change oxygen saturation probe site  4.  Every 4-6 hours:  If on nasal continuous positive airway pressure, respiratory therapy assess nares and determine need for appliance change or resting period.  Outcome: Progressing     Problem: ABCDS Injury Assessment  Goal: Absence of physical injury  Outcome: Progressing  Flowsheets (Taken 2/25/2024 1547)  Absence of Physical Injury: Implement safety measures based on patient assessment     Problem: Pain  Goal: Verbalizes/displays adequate comfort level or baseline comfort level  Outcome: Progressing  Flowsheets (Taken 2/25/2024 1547)  Verbalizes/displays adequate comfort level or baseline comfort level:   Encourage patient to monitor pain and request assistance   Administer analgesics based on type and severity of pain and evaluate response   Implement non-pharmacological measures as appropriate and evaluate response   Assess pain using appropriate pain scale     Problem: Nutrition Deficit:  Goal: Optimize nutritional status  Outcome: Progressing  Flowsheets (Taken 2/25/2024 1547)  Nutrient intake appropriate for

## 2024-02-25 NOTE — PROGRESS NOTES
Rissa Becerra  2/25/2024  [id number]    Chief Complaint: Acute cerebrovascular accident (CVA) due to ischemia (HCC)    Subjective:   Patient reports she is doing well today. Endorses some intermittent muscle tightness in the left arm and leg, which is uncomfortable. Also endorses intermittent left ankle pain.      Last BM: Stool Occurrence: 1 (02/24/24 0625)  PRNs administered past 24h: Tylenol    Objective:  Patient Vitals for the past 24 hrs:   BP Temp Temp src Pulse Resp SpO2 Weight   02/25/24 1000 119/63 98.1 °F (36.7 °C) Oral 80 16 94 % --   02/25/24 0539 -- -- -- -- -- -- 86 kg (189 lb 11.2 oz)   02/24/24 1945 110/68 98.2 °F (36.8 °C) Oral 69 16 92 % --     Gen: No distress, pleasant.   HEENT: Normocephalic, atraumatic.   CV: Regular rate and rhythm. Extremities warm, well perfused.   Resp: No respiratory distress. CTAB.  Abd: Soft, nontender.  Ext: Left lower leg in splint with ACE wrap.  Neuro: Alert, oriented, appropriately interactive. Left hemiparesis, some intermittent movement of the LLE now noted. MAS 1 to 1+ LUE elbow flexors, wrist flexors.     Laboratory data: Available via EMR.     Therapy progress:       PT    Supine to Sit: Dependent  Sit to Supine: Dependent   Sit to Stand:    Chair/Bed to Chair Transfer: Dependent  Car Transfer:    Ambulation 10 ft:    Ambulation 50 ft:    Ambulation 150 ft:    Stairs - 1 Step:    Stairs - 4 Step:    Stairs - 12 Step:      OT    Eating: Substantial/maximal assistance  Oral Hygiene: Dependent  Bathing: Dependent  Upper Body Dressing: Substantial/maximal assistance  Lower Body Dressing: Dependent  Toilet Transfer: Dependent  Toilet Hygiene: Dependent    Speech Therapy    Pt presents with mild/moderate cognitive decline characterized by deficits in thought organization, attention, memory, and problem solving in addition to left visual neglect. The pt also presents with mild/moderate motor speech deficits related to impaired left labial weakness and  incoordination resulting in decreased rate, blended word boundaries, decreased breath support, and reduced phonation/respiration coordination and impacting speech intelligibility. Left weakness is also contributing to oropharyngeal dysphagia however pt has been responsive to NMES via Vital Stim to improve oropharyngeal function. Pt appears to be tolerating dysphagia diet with introduction of water protocol and trials of thin liquids. Plan for ongoing trials of thin liquids and advanced solids prior to diet upgrade. Recommend speech language, cognitive linguistic, and dysphagia therapy to address deficits and facilitate safe return to prior level of function.    Body mass index is 34.7 kg/m².    Assessment:    This patient continues to require an ARU level of care from all disciplines to address the following issues:    Patient Active Problem List   Diagnosis    Localized edema    Diabetic polyneuropathy (HCC)    Acute CVA (cerebrovascular accident) ([facility])    Left hemiplegia ([facility])    Dysphagia    CAD (coronary artery disease)    DM2 (diabetes mellitus, type 2) ([facility])    HTN (hypertension), benign    Obesity (BMI 30-39.9)    Acute left-sided weakness    Arterial ischemic stroke, ICA, right, acute (HCC)    DM (diabetes mellitus), secondary, with complications ([facility])    Dyslipidemia    Closed displaced fracture of lateral malleolus of left fibula    Acute cerebrovascular accident (CVA) due to ischemia ([facility])       Functional progress: No weekend updates.     Plan:  #. Acute R MCA territory ischemic stroke  - thromoembolic vs cardioembolic  - MRI Brain w/ acute R MCA infarct  - Echo with LVEF 55-60%, indeterminate diastolic dysfunction, RV dilation, negative bubble study  - ASA, clopidigrel, statin  - cardiac event monior x 30 days     #. Spasticity, related to above  - PROM 3-4 times daily  - Will assess the need for resting wrist/elbow splint  - Will defer pharmacotherapy at this time, but low threshold to initiate

## 2024-02-25 NOTE — PROGRESS NOTES
PM assessment complete. VSS. Medications given per MAR. Fall precautions in place, hourly rounding, call light and belongings in reach, bed in lowest position, wheels locked in place, side rails up x 2, walkways free of clutter

## 2024-02-25 NOTE — PLAN OF CARE
Problem: Safety - Adult  Goal: Free from fall injury  2/24/2024 2328 by Willa Lopez, RN  Outcome: Progressing     Problem: Skin/Tissue Integrity  Goal: Absence of new skin breakdown  Description: 1.  Monitor for areas of redness and/or skin breakdown  2.  Assess vascular access sites hourly  3.  Every 4-6 hours minimum:  Change oxygen saturation probe site  4.  Every 4-6 hours:  If on nasal continuous positive airway pressure, respiratory therapy assess nares and determine need for appliance change or resting period.  2/24/2024 2328 by Willa Lopez, RN  Outcome: Progressing     Problem: ABCDS Injury Assessment  Goal: Absence of physical injury  2/24/2024 2328 by Willa Lopez, RN  Outcome: Progressing     Problem: Chronic Conditions and Co-morbidities  Goal: Patient's chronic conditions and co-morbidity symptoms are monitored and maintained or improved  2/24/2024 2328 by Willa Lopez, RN  Outcome: Progressing

## 2024-02-26 LAB
ANION GAP SERPL CALCULATED.3IONS-SCNC: 10 MMOL/L (ref 3–16)
BUN SERPL-MCNC: 31 MG/DL (ref 7–20)
CALCIUM SERPL-MCNC: 9.4 MG/DL (ref 8.3–10.6)
CHLORIDE SERPL-SCNC: 103 MMOL/L (ref 99–110)
CO2 SERPL-SCNC: 25 MMOL/L (ref 21–32)
CREAT SERPL-MCNC: 0.6 MG/DL (ref 0.6–1.2)
DEPRECATED RDW RBC AUTO: 14.1 % (ref 12.4–15.4)
GFR SERPLBLD CREATININE-BSD FMLA CKD-EPI: >60 ML/MIN/{1.73_M2}
GLUCOSE BLD-MCNC: 128 MG/DL (ref 70–99)
GLUCOSE BLD-MCNC: 136 MG/DL (ref 70–99)
GLUCOSE BLD-MCNC: 151 MG/DL (ref 70–99)
GLUCOSE BLD-MCNC: 171 MG/DL (ref 70–99)
GLUCOSE BLD-MCNC: 96 MG/DL (ref 70–99)
GLUCOSE SERPL-MCNC: 112 MG/DL (ref 70–99)
HCT VFR BLD AUTO: 36.2 % (ref 36–48)
HGB BLD-MCNC: 11.9 G/DL (ref 12–16)
MCH RBC QN AUTO: 29.9 PG (ref 26–34)
MCHC RBC AUTO-ENTMCNC: 33 G/DL (ref 31–36)
MCV RBC AUTO: 90.6 FL (ref 80–100)
PERFORMED ON: ABNORMAL
PERFORMED ON: NORMAL
PLATELET # BLD AUTO: 436 K/UL (ref 135–450)
PMV BLD AUTO: 6.9 FL (ref 5–10.5)
POTASSIUM SERPL-SCNC: 4.5 MMOL/L (ref 3.5–5.1)
RBC # BLD AUTO: 4 M/UL (ref 4–5.2)
SODIUM SERPL-SCNC: 138 MMOL/L (ref 136–145)
WBC # BLD AUTO: 9 K/UL (ref 4–11)

## 2024-02-26 PROCEDURE — 97130 THER IVNTJ EA ADDL 15 MIN: CPT

## 2024-02-26 PROCEDURE — 6370000000 HC RX 637 (ALT 250 FOR IP): Performed by: NURSE PRACTITIONER

## 2024-02-26 PROCEDURE — 6360000002 HC RX W HCPCS: Performed by: STUDENT IN AN ORGANIZED HEALTH CARE EDUCATION/TRAINING PROGRAM

## 2024-02-26 PROCEDURE — 6370000000 HC RX 637 (ALT 250 FOR IP): Performed by: STUDENT IN AN ORGANIZED HEALTH CARE EDUCATION/TRAINING PROGRAM

## 2024-02-26 PROCEDURE — 92507 TX SP LANG VOICE COMM INDIV: CPT

## 2024-02-26 PROCEDURE — 92526 ORAL FUNCTION THERAPY: CPT

## 2024-02-26 PROCEDURE — 97535 SELF CARE MNGMENT TRAINING: CPT

## 2024-02-26 PROCEDURE — 97530 THERAPEUTIC ACTIVITIES: CPT

## 2024-02-26 PROCEDURE — 97112 NEUROMUSCULAR REEDUCATION: CPT

## 2024-02-26 PROCEDURE — 85027 COMPLETE CBC AUTOMATED: CPT

## 2024-02-26 PROCEDURE — 36415 COLL VENOUS BLD VENIPUNCTURE: CPT

## 2024-02-26 PROCEDURE — 1280000000 HC REHAB R&B

## 2024-02-26 PROCEDURE — 97129 THER IVNTJ 1ST 15 MIN: CPT

## 2024-02-26 PROCEDURE — 80048 BASIC METABOLIC PNL TOTAL CA: CPT

## 2024-02-26 RX ORDER — DULOXETIN HYDROCHLORIDE 30 MG/1
30 CAPSULE, DELAYED RELEASE ORAL DAILY
Status: DISCONTINUED | OUTPATIENT
Start: 2024-02-27 | End: 2024-03-07

## 2024-02-26 RX ORDER — FAMOTIDINE 20 MG/1
20 TABLET, FILM COATED ORAL 2 TIMES DAILY PRN
Status: DISCONTINUED | OUTPATIENT
Start: 2024-02-26 | End: 2024-03-08 | Stop reason: HOSPADM

## 2024-02-26 RX ORDER — NYSTATIN 100000 U/G
OINTMENT TOPICAL 2 TIMES DAILY
Status: DISCONTINUED | OUTPATIENT
Start: 2024-02-26 | End: 2024-03-08 | Stop reason: HOSPADM

## 2024-02-26 RX ORDER — TAMSULOSIN HYDROCHLORIDE 0.4 MG/1
0.4 CAPSULE ORAL DAILY
Status: DISCONTINUED | OUTPATIENT
Start: 2024-02-26 | End: 2024-02-27

## 2024-02-26 RX ADMIN — Medication 1 TABLET: at 09:57

## 2024-02-26 RX ADMIN — NYSTATIN OINTMENT: 100000 OINTMENT TOPICAL at 13:49

## 2024-02-26 RX ADMIN — DULOXETINE HYDROCHLORIDE 20 MG: 20 CAPSULE, DELAYED RELEASE ORAL at 09:57

## 2024-02-26 RX ADMIN — TAMSULOSIN HYDROCHLORIDE 0.4 MG: 0.4 CAPSULE ORAL at 10:16

## 2024-02-26 RX ADMIN — INSULIN GLARGINE 36 UNITS: 100 INJECTION, SOLUTION SUBCUTANEOUS at 09:56

## 2024-02-26 RX ADMIN — CARVEDILOL 12.5 MG: 6.25 TABLET, FILM COATED ORAL at 16:52

## 2024-02-26 RX ADMIN — CARVEDILOL 12.5 MG: 6.25 TABLET, FILM COATED ORAL at 09:56

## 2024-02-26 RX ADMIN — METFORMIN HYDROCHLORIDE 1000 MG: 500 TABLET ORAL at 09:56

## 2024-02-26 RX ADMIN — Medication 2 CAPSULE: at 09:56

## 2024-02-26 RX ADMIN — CLOPIDOGREL 75 MG: 75 TABLET, FILM COATED ORAL at 09:56

## 2024-02-26 RX ADMIN — ATORVASTATIN CALCIUM 80 MG: 80 TABLET, FILM COATED ORAL at 21:46

## 2024-02-26 RX ADMIN — ENOXAPARIN SODIUM 40 MG: 100 INJECTION SUBCUTANEOUS at 09:55

## 2024-02-26 RX ADMIN — NYSTATIN OINTMENT: 100000 OINTMENT TOPICAL at 21:47

## 2024-02-26 RX ADMIN — CETIRIZINE HYDROCHLORIDE 10 MG: 10 TABLET, FILM COATED ORAL at 09:57

## 2024-02-26 RX ADMIN — SACUBITRIL AND VALSARTAN 1 TABLET: 24; 26 TABLET, FILM COATED ORAL at 09:57

## 2024-02-26 RX ADMIN — SACUBITRIL AND VALSARTAN 1 TABLET: 24; 26 TABLET, FILM COATED ORAL at 21:46

## 2024-02-26 RX ADMIN — ASPIRIN 81 MG: 81 TABLET, COATED ORAL at 09:57

## 2024-02-26 RX ADMIN — FAMOTIDINE 20 MG: 20 TABLET, FILM COATED ORAL at 16:52

## 2024-02-26 RX ADMIN — FUROSEMIDE 20 MG: 20 TABLET ORAL at 09:57

## 2024-02-26 RX ADMIN — METFORMIN HYDROCHLORIDE 1000 MG: 500 TABLET ORAL at 16:52

## 2024-02-26 RX ADMIN — SPIRONOLACTONE 12.5 MG: 25 TABLET ORAL at 09:57

## 2024-02-26 RX ADMIN — CHOLECALCIFEROL TAB 25 MCG (1000 UNIT) 1000 UNITS: 25 TAB at 09:57

## 2024-02-26 ASSESSMENT — PAIN SCALES - GENERAL
PAINLEVEL_OUTOF10: 0
PAINLEVEL_OUTOF10: 0

## 2024-02-26 NOTE — PLAN OF CARE
Problem: Discharge Planning  Goal: Discharge to home or other facility with appropriate resources  2/25/2024 2157 by Julienne Emanuel RN  Outcome: Progressing  2/25/2024 1547 by Maryuri Knapp RN  Outcome: Progressing  Flowsheets (Taken 2/25/2024 1547)  Discharge to home or other facility with appropriate resources:   Identify barriers to discharge with patient and caregiver   Arrange for needed discharge resources and transportation as appropriate   Identify discharge learning needs (meds, wound care, etc)   Arrange for interpreters to assist at discharge as needed     Problem: Safety - Adult  Goal: Free from fall injury  2/25/2024 2157 by Julienne Emanuel RN  Outcome: Progressing  2/25/2024 1547 by Maryuri Knapp RN  Outcome: Progressing  Flowsheets (Taken 2/25/2024 1547)  Free From Fall Injury: Instruct family/caregiver on patient safety     Problem: Skin/Tissue Integrity  Goal: Absence of new skin breakdown  Description: 1.  Monitor for areas of redness and/or skin breakdown  2.  Assess vascular access sites hourly  3.  Every 4-6 hours minimum:  Change oxygen saturation probe site  4.  Every 4-6 hours:  If on nasal continuous positive airway pressure, respiratory therapy assess nares and determine need for appliance change or resting period.  2/25/2024 2157 by Julienne Emanuel RN  Outcome: Progressing  2/25/2024 1547 by Maryuri Knapp RN  Outcome: Progressing     Problem: ABCDS Injury Assessment  Goal: Absence of physical injury  2/25/2024 2157 by Julienne Emanuel RN  Outcome: Progressing  2/25/2024 1547 by Maryuri Knapp RN  Outcome: Progressing  Flowsheets (Taken 2/25/2024 1547)  Absence of Physical Injury: Implement safety measures based on patient assessment     Problem: Pain  Goal: Verbalizes/displays adequate comfort level or baseline comfort level  2/25/2024 2157 by Julienne Emanuel RN  Outcome: Progressing  2/25/2024 1547 by Maryuri Knapp

## 2024-02-26 NOTE — CONSULTS
appears unremarkable.  Lymph: no palpable adenopathy in supraclavicular, or axillary lymph nodes.  Cardiovascular: Regular rate. No peripheral edema.  ABDOMEN: Abdomen soft, non-tender, non-distended. No enlarged liver or spleen. No hernias noted. Stool occult blood not indicated.  Respiratory: Respirations are even and non-labored. No audible breath sounds.  Genitourinary: rubio in placer draining CYU  Psychiatric: A + O x 3, normal affect. Insight appears intact.  Muskuloskeletal: WALDO x 4  Skin: Pink, warm and dry.  No rashes on face and arms.      Intake/Output Summary (Last 24 hours) at 2/26/2024 0958  Last data filed at 2/25/2024 2341  Gross per 24 hour   Intake --   Output 1250 ml   Net -1250 ml       LABS     CBC:   Lab Results   Component Value Date/Time    WBC 9.0 02/26/2024 05:40 AM    RBC 4.00 02/26/2024 05:40 AM    HGB 11.9 02/26/2024 05:40 AM    HCT 36.2 02/26/2024 05:40 AM    MCV 90.6 02/26/2024 05:40 AM    MCH 29.9 02/26/2024 05:40 AM    MCHC 33.0 02/26/2024 05:40 AM    RDW 14.1 02/26/2024 05:40 AM     02/26/2024 05:40 AM    MPV 6.9 02/26/2024 05:40 AM     BMP:   Lab Results   Component Value Date/Time     02/26/2024 05:40 AM    K 4.5 02/26/2024 05:40 AM     02/26/2024 05:40 AM    CO2 25 02/26/2024 05:40 AM    BUN 31 02/26/2024 05:40 AM    CREATININE 0.6 02/26/2024 05:40 AM    GLUCOSE 112 02/26/2024 05:40 AM    CALCIUM 9.4 02/26/2024 05:40 AM     Urinalysis:   Lab Results   Component Value Date/Time    COLORU Yellow 02/15/2024 01:30 PM    GLUCOSEU Negative 02/15/2024 01:30 PM    BLOODU TRACE 02/15/2024 01:30 PM    NITRU Negative 02/15/2024 01:30 PM    LEUKOCYTESUR Negative 02/15/2024 01:30 PM     Urine culture: No results for input(s): \"LABURIN\" in the last 72 hours.     IMAGING     CT ABDOMEN PELVIS W IV CONTRAST Additional Contrast? None    Result Date: 2/18/2024  EXAMINATION: CT OF THE ABDOMEN AND PELVIS WITH CONTRAST 2/18/2024 10:52 am TECHNIQUE: CT of the abdomen and pelvis was  atherosclerotic disease of the distal vertebral arteries with no significant narrowing of the distal vertebral arteries.  The basilar artery is patent.  The right posterior cerebral artery is patent.  There is occlusion of the P1 segment of the left posterior cerebral artery. OTHER: No dural venous sinus thrombosis on this non-dedicated study. BRAIN: Refer to the CT head of the same date. These findings were discussed with Dr. Yanez 01/12/2024 1:44 p.m.     No significant abnormality of the neck vessels.  Approximately 30% stenosis of the left internal carotid artery in the region of the bulb. Occlusion of the left posterior cerebral artery in its P1 segment.  There is tapering of the distal M2 and M3 segments in the right middle cerebral artery territory with decreased visualization of vessels in the right parietal region.  No other significant abnormality of the intracranial circulation.     XR ANKLE LEFT (MIN 3 VIEWS)    Result Date: 2/12/2024  EXAMINATION: THREE XRAY VIEWS OF THE LEFT ANKLE 2/12/2024 1:30 pm COMPARISON: None. HISTORY: ORDERING SYSTEM PROVIDED HISTORY: left ankle pain and swelling TECHNOLOGIST PROVIDED HISTORY: Reason for exam:->left ankle pain and swelling Reason for Exam: Cerebrovascular Accident (Pt brought in per [facility] from home, pt was taken by family to [facility] on Friday for slurred speech, dx with a CVA, then seen by a telehealth neurologist and discharged on Saturday.  Per family pts symptoms have progressed daily since discharge.  Pt has left sided facial droop, left arm flaccid.  Stroke alert called in the field) FINDINGS: Minimally displaced oblique fracture is seen through the lateral malleolus. There is surrounding soft tissue swelling. Medial malleolus appears intact.  Severe degenerative change is seen at the tibiotalar joint Spurring is seen in the plantar fascia insertion.  There is atherosclerosis.     Minimally displaced oblique fracture is seen through the

## 2024-02-26 NOTE — PROGRESS NOTES
Rissa Becerra  2/26/2024  [id number]    Chief Complaint: Acute cerebrovascular accident (CVA) due to ischemia (HCC)    Subjective:   Patient reports she is doing well today. Ongoing neuralgic pain involving the left arm and leg. Intermittent muscle spasm of the left arm and leg. Family reports some concerns with skin irritation under patient's breast.     Last BM: Stool Occurrence: 1 (02/24/24 0625)  PRNs administered past 24h: Tylenol, Simethicone    Objective:  Patient Vitals for the past 24 hrs:   BP Temp Temp src Pulse Resp SpO2 Weight   02/26/24 1610 106/61 -- -- 72 -- -- --   02/26/24 0949 115/68 97.9 °F (36.6 °C) Oral 75 18 95 % --   02/26/24 0600 -- -- -- -- -- -- 85.3 kg (188 lb 1.6 oz)   02/25/24 1930 109/64 98 °F (36.7 °C) Oral 68 14 93 % --   02/25/24 1716 123/72 -- -- 76 -- -- --       Gen: No distress, pleasant.   HEENT: Normocephalic, atraumatic.   CV: Regular rate and rhythm. Extremities warm, well perfused.   Resp: No respiratory distress. CTAB.  Abd: Soft, nontender.  Ext: Left lower leg in splint with ACE wrap.  Neuro: Alert, oriented, appropriately interactive. Left hemiparesis, some intermittent movement of the LLE now noted. MAS 1 to 1+ LUE elbow flexors, wrist flexors.     Laboratory data: Available via EMR.     Therapy progress:       PT    Supine to Sit: Dependent  Sit to Supine: Dependent   Sit to Stand:    Chair/Bed to Chair Transfer: Dependent  Car Transfer:    Ambulation 10 ft:    Ambulation 50 ft:    Ambulation 150 ft:    Stairs - 1 Step:    Stairs - 4 Step:    Stairs - 12 Step:      OT    Eating: Substantial/maximal assistance  Oral Hygiene: Dependent  Bathing: Dependent  Upper Body Dressing: Substantial/maximal assistance  Lower Body Dressing: Dependent  Toilet Transfer: Dependent  Toilet Hygiene: Dependent    Speech Therapy    Pt presents with mild/moderate cognitive decline characterized by deficits in thought organization, attention, memory, and problem solving in addition to  Spasticity, related to above  - PROM 3-4 times daily  - Will assess the need for resting wrist/elbow splint, not indicated at present  - Will defer pharmacotherapy at this time, but low threshold to initiate baclofen.     #. Neurogenic bladder  - Urology following  - Tamsulosin 0.4 mg daily  - Repeat void trial in ~3 days.     #. L ankle fx  - Managed non-op per Ortho  - NWB LLE    #. Leukocytosis, resolving  - WBCs 12.1 -> 11.5  - Afrebrile, no tachycardia  - CT A/P w/ contrast unremarkable for acute infectious process    #. Oropharyngeal Dysphagia  - SLP   - ADULT DIET; Dysphagia - Minced and Moist; 5 carb choices (75 gm/meal); No Drinking Straws; no scrambles eggs  ADULT ORAL NUTRITION SUPPLEMENT; Breakfast, Lunch, Dinner; Diabetic Oral Supplement     #. Chronic combined systolic/diastolic heart failure  - BB, Entresto, spironolactone  - decrease Lasix to 20 mg daily, below her home dry weight, no overt findings of volume overload, azotemia present on recent BMP  - daily weights  - BUN improving on BMP today     #. Aspiration PNA, resolved     #. DM2, last A1c  6.1%, insulin dependent  - Lantus 36 units daily w/ breakfast  - med intensity SSI TID ac + hs  - continue home metformin 1000 mg BID  - On Trulicity at home, family may bring in for pharmacy verification.      Bowels: Per protocol.  Simethicone as needed for cramping, bloating, flatulence.  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  While all precautions were taken to ensure accuracy, errors may have occurred.  Please disregard any typographical errors.

## 2024-02-26 NOTE — PROGRESS NOTES
syndrome presentation in both seated and standing position. Mildly improving trunk balance with R side proprioceptive input.  Prior to hospitalization patient was independent in home and community.    Safety Interventions: patient left in bed, bed alarm in place, call light within reach, gait belt, and family/caregiver present    Plan  Frequency: 5 x/week, 60 min/day  Current Treatment Recommendations: strengthening, ROM, balance training, functional mobility training, transfer training, gait training, stair training, endurance training, neuromuscular re-education, wheelchair mobility training, manual therapy - soft tissue massage, modalities, patient/caregiver education, ADL/self-care training, cognitive/perceptual training, and pain management    Goals  Patient Goals: go to the bathroom on the toilet   Short Term Goals:  Time Frame: two weeks  Patient will complete toileting at maximum assistance   Patient will complete functional transfers at maximum assistance   Patient will increase functional sitting balance to CGA for improved ADL completion  Patient will complete bed mobility at minimal assistance   Patient will attend to L visual field with min verbal cues during functional activity.  Long Term Goals:  Time Frame: three weeks  Patient will complete upper body ADL at minimal assistance   Patient will complete lower body ADL at minimal assistance   Patient will complete toileting at minimal assistance   Patient will complete functional transfers at minimal assistance   Patient will complete functional mobility at stand by assistance, in LRAD      Above goals reviewed on 2/26/2024.  All goals are ongoing at this time unless indicated above.      Therapy Session Time      Individual Group Co-treatment   Time In      0830   Time Out      0937   Minutes      67       Second Session Therapy Time:     Individual Group Co-treatment   Time In     1245   Time Out     1523   Minutes     38     Timed Code minutes:   67+38  Total Treatment Minutes:   105  Electronically Signed By: NOE FARAH, PT, [id number]    2/28/2024:  Time grid changed to reflect appropriate treatment time.  All objective data documented by treating therapist.  Jim Avitia, DPT [id number]

## 2024-02-26 NOTE — PLAN OF CARE
Problem: Discharge Planning  Goal: Discharge to home or other facility with appropriate resources  2/26/2024 1055 by Briana Anderson RN  Outcome: Progressing     Problem: Safety - Adult  Goal: Free from fall injury  2/26/2024 1055 by Briana Anderson RN  Outcome: Progressing     Problem: Skin/Tissue Integrity  Goal: Absence of new skin breakdown  Description: 1.  Monitor for areas of redness and/or skin breakdown  2.  Assess vascular access sites hourly  3.  Every 4-6 hours minimum:  Change oxygen saturation probe site  4.  Every 4-6 hours:  If on nasal continuous positive airway pressure, respiratory therapy assess nares and determine need for appliance change or resting period.  2/26/2024 1055 by Briana Anderson, RN  Outcome: Progressing

## 2024-02-26 NOTE — PROGRESS NOTES
modifications to improve intake and endurance: feeding assistance as necessary, verbal cues, dividing meals, using smaller frequent meals and spreading protein drinks throughout the day   Will continue to consult with RD to monitor intake.      Tolerance of thin liquid    - pt and daughter denied any difficulty with thin liquids since upgrade on Friday   - demonstrated good oral containment with thin liquids   - tolerated 7/7 trials without overt s/s of aspiration     Trials of soft regular solids (1/2 blueberry muffin)   - encouraged mastication on L side to promote movement with use of NMES   - moderately to significantly prolonged mastication with reduced rotary chew  - reported biting of her left cheek   - provided moderate verbal cues for bolus propulsion and initiation   - improved timing with increased verbal cues and feeding assistance      Expressive Language: Severity: Mild   Not directly targeted     Motor Speech: Severity: Mild  and Moderate   Not directly targeted.     Cognition: Severity: Moderate   Fx Recall  - recalled telling people about speech tx on Friday in which she was able to trial a [employer] candy       Left visual attention   - required mod-max cues to sustain midline visual attention   - required mod-max verbal cues to attenuate to utensil/cup and food items from left    - mod-max cues to sustain attention to the task of eating, required verbal cues with each bite to initiate the next bite     Additional Interventions:   Pt's daughter reported completion of mentally engaging activities over the weekend, reported pt continued to struggle with attending to the left side of tasks.     Session 2:   Dysphagia: Severity: Moderate   Not directly targeted     Expressive Language: Severity: Mild   Not directly targeted     Motor Speech: Severity: Mild  and Moderate   Speech exercises   - Subjective speech rating: Utilized open ended description (personal history/ family). Pt rated that her voice  education   Discharge Recommendations: Home with assistance and 24 hour supervision  Continued SLP at Discharge: Yes     Goals  Patient Goals: \"everything\"   Time Frame: 3 weeks    Pt will tolerate recommended diet and advanced trials with no overt s/s of aspiration.   Ongoing  Patient will demonstrate comprehension and appropriate implementation of Kramer water protocol.   GOAL MET; upgraded to thin liquids  Pt will improve oral motor strength and ROM for coordinated and alternating motions, via graded tasks to improve speech back to baseline level as subjectively rated by SLP/pt and family.    Ongoing  Patient will complete functional problem-solving tasks for daily situations with 85% accuracy or given min cues.   Ongoing  Pt will improve attention to detail for graded complex information to 80%, for improved recall and retention of newly learned information   Ongoing  Pt will complete word associative tasks to improve mental flexibility, complex thinking, and working memory skills with 80% accuracy.     Ongoing  Pt will improve functional visual scanning for reading comprehension and functional visual task completion with incorporation of compensatory strategies, to mild cues    Ongoing      Therapy Session Time      Session 1 Session 2   Time In 0800 1030   Time Out 0835 1100   Time Code Minutes 15 20   Individual Minutes 35 30     Timed Code Treatment Minutes: 35   Total Treatment Minutes:  65    Electronically Signed By:   SONYA Cox  Speech-Language Pathology Graduate Clinician    The speech-language pathologist was present, directed the patient's care, made skilled judgment and was responsible for assessment and treatment.   Glenny Canseco M.A. [facility] [place] [id number]  Speech-Language Pathologist   2/26/2024 9:06 AM

## 2024-02-26 NOTE — PROGRESS NOTES
Morning assessment completed, vss, denies pain, schedule meds given, The care plan and education has been reviewed and mutually agreed upon with the patient.  Pt remains free from falls. Fall precautions in place--bed in lowest position, call light within reach, bed alarm in use. Pt aware to call for assistance before getting up.

## 2024-02-27 LAB
GLUCOSE BLD-MCNC: 145 MG/DL (ref 70–99)
GLUCOSE BLD-MCNC: 150 MG/DL (ref 70–99)
GLUCOSE BLD-MCNC: 163 MG/DL (ref 70–99)
GLUCOSE BLD-MCNC: 180 MG/DL (ref 70–99)
PERFORMED ON: ABNORMAL

## 2024-02-27 PROCEDURE — 92507 TX SP LANG VOICE COMM INDIV: CPT

## 2024-02-27 PROCEDURE — 92526 ORAL FUNCTION THERAPY: CPT

## 2024-02-27 PROCEDURE — 1280000000 HC REHAB R&B

## 2024-02-27 PROCEDURE — 6360000002 HC RX W HCPCS: Performed by: STUDENT IN AN ORGANIZED HEALTH CARE EDUCATION/TRAINING PROGRAM

## 2024-02-27 PROCEDURE — 97130 THER IVNTJ EA ADDL 15 MIN: CPT

## 2024-02-27 PROCEDURE — 97112 NEUROMUSCULAR REEDUCATION: CPT

## 2024-02-27 PROCEDURE — 97535 SELF CARE MNGMENT TRAINING: CPT

## 2024-02-27 PROCEDURE — 6370000000 HC RX 637 (ALT 250 FOR IP): Performed by: STUDENT IN AN ORGANIZED HEALTH CARE EDUCATION/TRAINING PROGRAM

## 2024-02-27 PROCEDURE — 97530 THERAPEUTIC ACTIVITIES: CPT

## 2024-02-27 PROCEDURE — 97129 THER IVNTJ 1ST 15 MIN: CPT

## 2024-02-27 RX ORDER — TAMSULOSIN HYDROCHLORIDE 0.4 MG/1
0.4 CAPSULE ORAL
Status: DISCONTINUED | OUTPATIENT
Start: 2024-02-27 | End: 2024-03-08 | Stop reason: HOSPADM

## 2024-02-27 RX ADMIN — NYSTATIN OINTMENT: 100000 OINTMENT TOPICAL at 10:01

## 2024-02-27 RX ADMIN — Medication 2 CAPSULE: at 09:58

## 2024-02-27 RX ADMIN — CARVEDILOL 12.5 MG: 6.25 TABLET, FILM COATED ORAL at 09:57

## 2024-02-27 RX ADMIN — SACUBITRIL AND VALSARTAN 1 TABLET: 24; 26 TABLET, FILM COATED ORAL at 10:00

## 2024-02-27 RX ADMIN — CARVEDILOL 12.5 MG: 6.25 TABLET, FILM COATED ORAL at 16:58

## 2024-02-27 RX ADMIN — ATORVASTATIN CALCIUM 80 MG: 80 TABLET, FILM COATED ORAL at 20:10

## 2024-02-27 RX ADMIN — NYSTATIN OINTMENT: 100000 OINTMENT TOPICAL at 20:10

## 2024-02-27 RX ADMIN — ENOXAPARIN SODIUM 40 MG: 100 INJECTION SUBCUTANEOUS at 09:58

## 2024-02-27 RX ADMIN — METFORMIN HYDROCHLORIDE 1000 MG: 500 TABLET ORAL at 10:03

## 2024-02-27 RX ADMIN — METFORMIN HYDROCHLORIDE 1000 MG: 500 TABLET ORAL at 16:58

## 2024-02-27 RX ADMIN — SACUBITRIL AND VALSARTAN 1 TABLET: 24; 26 TABLET, FILM COATED ORAL at 20:10

## 2024-02-27 RX ADMIN — CHOLECALCIFEROL TAB 25 MCG (1000 UNIT) 1000 UNITS: 25 TAB at 09:57

## 2024-02-27 RX ADMIN — INSULIN GLARGINE 36 UNITS: 100 INJECTION, SOLUTION SUBCUTANEOUS at 09:56

## 2024-02-27 RX ADMIN — CETIRIZINE HYDROCHLORIDE 10 MG: 10 TABLET, FILM COATED ORAL at 09:57

## 2024-02-27 RX ADMIN — TAMSULOSIN HYDROCHLORIDE 0.4 MG: 0.4 CAPSULE ORAL at 20:10

## 2024-02-27 RX ADMIN — DULOXETINE HYDROCHLORIDE 30 MG: 30 CAPSULE, DELAYED RELEASE ORAL at 09:58

## 2024-02-27 RX ADMIN — CLOPIDOGREL 75 MG: 75 TABLET, FILM COATED ORAL at 09:57

## 2024-02-27 RX ADMIN — Medication 1 TABLET: at 10:01

## 2024-02-27 RX ADMIN — SPIRONOLACTONE 12.5 MG: 25 TABLET ORAL at 09:58

## 2024-02-27 RX ADMIN — ASPIRIN 81 MG: 81 TABLET, COATED ORAL at 09:59

## 2024-02-27 ASSESSMENT — PAIN SCALES - GENERAL: PAINLEVEL_OUTOF10: 0

## 2024-02-27 NOTE — PLAN OF CARE
Problem: Discharge Planning  Goal: Discharge to home or other facility with appropriate resources  2/26/2024 2350 by Katia Cyr RN  Outcome: Progressing     Problem: Safety - Adult  Goal: Free from fall injury  2/26/2024 2350 by Katia Cyr RN  Outcome: Progressing     Problem: Skin/Tissue Integrity  Goal: Absence of new skin breakdown  Description: 1.  Monitor for areas of redness and/or skin breakdown  2.  Assess vascular access sites hourly  3.  Every 4-6 hours minimum:  Change oxygen saturation probe site  4.  Every 4-6 hours:  If on nasal continuous positive airway pressure, respiratory therapy assess nares and determine need for appliance change or resting period.  2/26/2024 2350 by Katia Cyr RN  Outcome: Progressing     Problem: ABCDS Injury Assessment  Goal: Absence of physical injury  Outcome: Progressing     Problem: Pain  Goal: Verbalizes/displays adequate comfort level or baseline comfort level  Outcome: Progressing     Problem: Chronic Conditions and Co-morbidities  Goal: Patient's chronic conditions and co-morbidity symptoms are monitored and maintained or improved  Outcome: Progressing

## 2024-02-27 NOTE — PROGRESS NOTES
presents with oropharyngeal dysphagia secondary to impaired mastication, decreased labial seal, reduced bolus control, prolonged/impaired A-P bolus transport, pharyngeal pooling with delayed swallow initiation, decreased anterior hyoid movement, delayed epiglottic inversion, decreased laryngeal elevation and reduced tongue base retraction, complicated by new CVA, advanced age resulting in observed laryngeal penetration of thin and Mildly Thick (Nectar) Liquids, anterior bolus loss, impaired oral clearing with oral residue and collection of pharyngeal residue after the swallow. With all liquid consistencies pt demonstrated anterior bolus loss. With thin and Mildly Thick (Nectar) Liquids laryngeal penetration was viewed during the swallow this appeared to be due pharyngeal pooling with delayed swallow initiation and decreased airway closure. Material entered the airway largely remained above the vocal folds (intermittent contact with the vocal folds noted with thin liquids) and was ejected from the airway. No aspiration was definitively viewed during the study. With puree and Moderately Thick (honey) Liquids pooling to the underside of the epiglottis was noted with delayed swallow initiation and decreased airway closure however, no airway invasion was viewed during the swallow. With solids pt demonstrated prolonged/disorganized mastication with solid pieces unchewed. After the swallow, left side buccal pocketing was assessed with pt requiring manual removal of bolus after the study. Strategies of double swallow and liquid wash were not effective in clearing hard solid textures. Only minimal pharyngeal residue was viewed after the swallow that pt was able to clear with secondary swallow. Pt demonstrates increased risk for aspiration due to dysphagia therefore recommend close monitoring or respiratory status and following safe swallow strategies. See below for recommendations.   Precautions/Restrictions: high fall risk,  to improve intake and endurance: feeding assistance as necessary, verbal cues, dividing meals, using smaller frequent meals and spreading protein drinks throughout the day      Tolerance of thin liquid    - pt and daughter denied any difficulty with thin liquids since upgrade on Friday   - demonstrated good oral containment with thin liquids   - tolerated 7/7 trials without overt s/s of aspiration     Trials of soft regular solids (1/2 portion of biscuit and gravy, 1/4 portion of soft and bite sized sausage)   - encouraged mastication on L side to promote movement with use of NMES   - moderately prolonged mastication with reduced rotary chew, increasing movement and use of lingual sweep to clear minimal anterior bolus loss   - reported she is still biting of her left cheek   - provided moderate verbal cues for bolus propulsion and initiation   - improved timing with increased verbal cues and feeding assistance      Expressive Language: Severity: Mild   Not directly targeted     Motor Speech: Severity: Mild  and Moderate   Oral motor range of motion   - sustained vowels in isolation x5 each   - utilized her baseline singing warm ups   Required verbal cues to increase awareness to left, improved with sustained movements     Cognition: Severity: Moderate   Fx Recall  - Referred to association strategy for name recall   - Recalled association after 10 minute interval     Left visual attention   - required min-mod cues to sustain midline visual attention   - required mod verbal cues to attenuate to utensil/cup and food items from left    - mod cues to sustain attention to the task of eating, required verbal cues with each bite to initiate the next bite   - pt reported not being able to find her call light during the night; provided strategies to find if placed on the left (identifying cord connection)     Additional Interventions:   .     Session 2:   Dysphagia: Severity: Moderate   Not directly targeted   Per pt's

## 2024-02-27 NOTE — PLAN OF CARE
Problem: Discharge Planning  Goal: Discharge to home or other facility with appropriate resources  2/27/2024 1021 by Briana Anderson, RN  Outcome: Progressing     Problem: Safety - Adult  Goal: Free from fall injury  2/27/2024 1021 by Briana Anderson, RN  Outcome: Progressing     Problem: Skin/Tissue Integrity  Goal: Absence of new skin breakdown  Description: 1.  Monitor for areas of redness and/or skin breakdown  2.  Assess vascular access sites hourly  3.  Every 4-6 hours minimum:  Change oxygen saturation probe site  4.  Every 4-6 hours:  If on nasal continuous positive airway pressure, respiratory therapy assess nares and determine need for appliance change or resting period.  2/27/2024 1021 by Briana Anderson, RN  Outcome: Progressing

## 2024-02-27 NOTE — PROGRESS NOTES
Morning assessment completed, vss, alert and oriented, The care plan and education has been reviewed and mutually agreed upon with the patient.  Pt remains free from falls. Fall precautions in place--bed in lowest position, call light within reach, bed alarm in use. Pt aware to call for assistance before getting up.

## 2024-02-27 NOTE — PROGRESS NOTES
[facility] - Inpatient Rehabilitation Department   Phone: [phone]    Occupational Therapy    [] Initial Evaluation            [x] Daily Treatment Note         [] Discharge Summary      Patient: Rissa Becerra   : 1950   MRN: [id number]   Date of Service:  2024    Admitting Diagnosis:  Acute cerebrovascular accident (CVA) due to ischemia (HCC)  Current Admission Summary: Rissa Becerra is a 73 y.o. female with PMHx notable for HTN, HLD, type 2 diabetes mellitus, CHF, recent R MCA stroke (seen at OSH, started on DAPT and discharged home), who presented on 24 with worsening facial droop, left visual field loss, left sided weakness. She was ambulating with device, with minimal left sided weakness at time of prior hospital discharge, gradually developed worsening weakness resulting in several falls, including one causing L ankle fracture. She returned to [facility] ED. CT Head showed R frontal hypoattenuation. CTA Head/Neck showed L PCA occlusion, R M2 and M3 segmental narrowing. MRI Brain showed R MCA territory infarct. Echo with LVEF 55-60%, negative bubble study. Neurology was consulted, recommending DAPT, statin, cardiac event monitor. Orthopedics Concomitant injuries included: L ankle fracture, for which Ortho was consulted, recommending NWB LLE and non-op management in the context of recent stroke. Hospital course complicated by: aspiration pneumonia, L shoulder pain (CT L shoulder negative for fracture), urinary retention.      Patient reports that she is doing well today. She denies any fevers, chills, chest pain, shortness of breath. Endorses stable L visual field deficits, dysarthria, dysphagia, left sided weakness. Endorses left ankle pain. Endorses urinary retention, requiring straight cath since Kilgore catheter discontinued.      Past Medical History:  has a past medical history of Arthropathy, Asthma, Bell's palsy, Bronchitis, Diabetes mellitus (HCC), Diverticulitis,  and gaze holds at midline, and grasp/stack of cones, cones held HOHA in (L) and grasped with (R) (cones then stacked outside JEREMY to R on chair). Assist of 2 throughout for task facilitation, L UE management and sitting balance corrections.         Second Session:     Pt in recliner at entry, agreeable to session. Pt's spouse present throughout. Pt reports no pain and declined BADL needs.     Use of maxi-yoseph for recliner >> w/c and w/c >> supine in bed.     Seated at table w/c level pt completes ROM arch in order to facilitate anterior weight shifting/trunk flexion, visual tracking cross midline, WB on L UE and midline awareness- 1st rep completed with 6 rings moving R >> L, 2nd rep with 6 rings and short extension faye L >> R side. L UE placed in supportive WB position, yoga blocks utilized as R side external cues to facilitate regaining midline posture following each ring. Intermittent Assist on L for WB and balance, intermittent VC for visual tracking to (L) field.     Pt left in bed at EOS, pt rolls to assist in david pad removal with same assist as AM session. Bed alarm on, call light and needs in reach, pt's spouse in room.         Cognition  Overall Cognitive Status: Impaired  Arousal/Alertness: delayed responses to stimuli  Following Commands: follows one step commands consistently, follows multi step commands with repetition, follows multi step commands with increased time  Attention Span: attends with cues to redirect, difficulty attending to directions  Memory: decreased recall of recent events, decreased short term memory  Insights: decreased awareness of deficits  Initiation: requires cues for some  Sequencing: requires cues for some  Comments: easily distracted in multi-stim environments; continues to progress with ability to attend to L with TC/VC   Orientation:    oriented to person  Command Following:   accurately follows one step commands     Education  Barriers To Learning: cognition, language, and

## 2024-02-27 NOTE — PROGRESS NOTES
Rissa Becerra  2/27/2024  [id number]    Chief Complaint: Acute cerebrovascular accident (CVA) due to ischemia (HCC)    Subjective:   Patient reports she is doing well today.  Left-sided arm and leg pain are stable.  Denies any fevers, chills, chest pain, shortness of breath.    Last BM: Stool Occurrence: 1 (02/24/24 0625)  PRNs administered past 24h: Pepcid    Objective:  Patient Vitals for the past 24 hrs:   BP Temp Temp src Pulse Resp SpO2 Weight   02/27/24 0938 101/66 97.9 °F (36.6 °C) Oral 73 16 -- --   02/27/24 0524 -- -- -- -- -- -- 85.1 kg (187 lb 11.2 oz)   02/26/24 2135 99/61 98.2 °F (36.8 °C) Oral 68 18 93 % --   02/26/24 1610 106/61 -- -- 72 -- -- --       Gen: No distress, pleasant.   HEENT: Normocephalic, atraumatic.   CV: Regular rate and rhythm. Extremities warm, well perfused.   Resp: No respiratory distress. CTAB.  Abd: Soft, nontender.  Ext: Left lower leg in splint with ACE wrap.  Neuro: Alert, oriented, appropriately interactive. Left hemiparesis, some intermittent movement of the LLE now noted. MAS 1 to 1+ LUE elbow flexors, wrist flexors.     Laboratory data: Available via EMR.     Therapy progress:       PT    Supine to Sit: Dependent  Sit to Supine: Dependent   Sit to Stand:    Chair/Bed to Chair Transfer: Dependent  Car Transfer:    Ambulation 10 ft:    Ambulation 50 ft:    Ambulation 150 ft:    Stairs - 1 Step:    Stairs - 4 Step:    Stairs - 12 Step:      OT    Eating: Substantial/maximal assistance  Oral Hygiene: Dependent  Bathing: Dependent  Upper Body Dressing: Substantial/maximal assistance  Lower Body Dressing: Dependent  Toilet Transfer: Dependent  Toilet Hygiene: Dependent    Speech Therapy    Pt presents with mild/moderate cognitive decline characterized by deficits in thought organization, attention, memory, and problem solving in addition to left visual neglect. The pt also presents with mild/moderate motor speech deficits related to impaired left labial weakness and

## 2024-02-27 NOTE — PROGRESS NOTES
[facility] - Inpatient Rehabilitation Department   Phone: [phone]    Physical Therapy    [] Initial Evaluation            [x] Daily Treatment Note         [] Discharge Summary      Patient: Rissa Becerra   : 1950   MRN: [id number]   Date of Service:  2024  Admitting Diagnosis: Acute cerebrovascular accident (CVA) due to ischemia (HCC)  Current Admission Summary: Rissa Becerra is a 73 y.o. female with PMHx notable for HTN, HLD, type 2 diabetes mellitus, CHF, recent R MCA stroke (seen at OSH, started on DAPT and discharged home), who presented on 24 with worsening facial droop, left visual field loss, left sided weakness. She was ambulating with device, with minimal left sided weakness at time of prior hospital discharge, gradually developed worsening weakness resulting in several falls, including one causing L ankle fracture. She returned to [facility] ED. CT Head showed R frontal hypoattenuation. CTA Head/Neck showed L PCA occlusion, R M2 and M3 segmental narrowing. MRI Brain showed R MCA territory infarct. Neurology was consulted, recommending DAPT, statin, cardiac event monitor. Orthopedics Concomitant injuries included: L ankle fracture, for which Ortho was consulted, recommending NWB LLE and non-op management in the context of recent stroke. Hospital course complicated by: aspiration pneumonia.   Past Medical History:  has a past medical history of Arthropathy, Asthma, Bell's palsy, Bronchitis, Diabetes mellitus (HCC), Diverticulitis, Hyperlipidemia, Hypertension, Migraine, Polyneuropathy in diabetes ([facility]), and Vertigo.  Past Surgical History:  has a past surgical history that includes hernia repair; Carpal tunnel release; Total knee arthroplasty; Rotator cuff repair; and Percutaneous Transluminal Coronary Angio (2023).  Discharge Recommendations: To be determined with progress   DME Required For Discharge: DME to be determined pending patient

## 2024-02-28 LAB
GLUCOSE BLD-MCNC: 130 MG/DL (ref 70–99)
GLUCOSE BLD-MCNC: 171 MG/DL (ref 70–99)
GLUCOSE BLD-MCNC: 202 MG/DL (ref 70–99)
GLUCOSE BLD-MCNC: 205 MG/DL (ref 70–99)
PERFORMED ON: ABNORMAL

## 2024-02-28 PROCEDURE — 51798 US URINE CAPACITY MEASURE: CPT

## 2024-02-28 PROCEDURE — 97130 THER IVNTJ EA ADDL 15 MIN: CPT

## 2024-02-28 PROCEDURE — 1280000000 HC REHAB R&B

## 2024-02-28 PROCEDURE — 6370000000 HC RX 637 (ALT 250 FOR IP): Performed by: STUDENT IN AN ORGANIZED HEALTH CARE EDUCATION/TRAINING PROGRAM

## 2024-02-28 PROCEDURE — 92507 TX SP LANG VOICE COMM INDIV: CPT

## 2024-02-28 PROCEDURE — 97112 NEUROMUSCULAR REEDUCATION: CPT

## 2024-02-28 PROCEDURE — 97129 THER IVNTJ 1ST 15 MIN: CPT

## 2024-02-28 PROCEDURE — 97530 THERAPEUTIC ACTIVITIES: CPT

## 2024-02-28 PROCEDURE — 92526 ORAL FUNCTION THERAPY: CPT

## 2024-02-28 PROCEDURE — 97535 SELF CARE MNGMENT TRAINING: CPT

## 2024-02-28 PROCEDURE — 6360000002 HC RX W HCPCS: Performed by: STUDENT IN AN ORGANIZED HEALTH CARE EDUCATION/TRAINING PROGRAM

## 2024-02-28 PROCEDURE — 51701 INSERT BLADDER CATHETER: CPT

## 2024-02-28 RX ADMIN — CETIRIZINE HYDROCHLORIDE 10 MG: 10 TABLET, FILM COATED ORAL at 09:15

## 2024-02-28 RX ADMIN — ENOXAPARIN SODIUM 40 MG: 100 INJECTION SUBCUTANEOUS at 09:15

## 2024-02-28 RX ADMIN — NYSTATIN OINTMENT: 100000 OINTMENT TOPICAL at 09:16

## 2024-02-28 RX ADMIN — TAMSULOSIN HYDROCHLORIDE 0.4 MG: 0.4 CAPSULE ORAL at 20:58

## 2024-02-28 RX ADMIN — ATORVASTATIN CALCIUM 80 MG: 80 TABLET, FILM COATED ORAL at 20:58

## 2024-02-28 RX ADMIN — METFORMIN HYDROCHLORIDE 1000 MG: 500 TABLET ORAL at 17:46

## 2024-02-28 RX ADMIN — NYSTATIN OINTMENT: 100000 OINTMENT TOPICAL at 20:57

## 2024-02-28 RX ADMIN — INSULIN GLARGINE 36 UNITS: 100 INJECTION, SOLUTION SUBCUTANEOUS at 09:14

## 2024-02-28 RX ADMIN — CLOPIDOGREL 75 MG: 75 TABLET, FILM COATED ORAL at 09:15

## 2024-02-28 RX ADMIN — CARVEDILOL 12.5 MG: 6.25 TABLET, FILM COATED ORAL at 09:14

## 2024-02-28 RX ADMIN — SPIRONOLACTONE 12.5 MG: 25 TABLET ORAL at 09:15

## 2024-02-28 RX ADMIN — CHOLECALCIFEROL TAB 25 MCG (1000 UNIT) 1000 UNITS: 25 TAB at 09:15

## 2024-02-28 RX ADMIN — CARVEDILOL 12.5 MG: 6.25 TABLET, FILM COATED ORAL at 17:46

## 2024-02-28 RX ADMIN — DULOXETINE HYDROCHLORIDE 30 MG: 30 CAPSULE, DELAYED RELEASE ORAL at 09:15

## 2024-02-28 RX ADMIN — Medication 2 CAPSULE: at 09:14

## 2024-02-28 RX ADMIN — METFORMIN HYDROCHLORIDE 1000 MG: 500 TABLET ORAL at 09:15

## 2024-02-28 RX ADMIN — SACUBITRIL AND VALSARTAN 1 TABLET: 24; 26 TABLET, FILM COATED ORAL at 09:15

## 2024-02-28 RX ADMIN — INSULIN LISPRO 2 UNITS: 100 INJECTION, SOLUTION INTRAVENOUS; SUBCUTANEOUS at 12:01

## 2024-02-28 RX ADMIN — SACUBITRIL AND VALSARTAN 1 TABLET: 24; 26 TABLET, FILM COATED ORAL at 20:58

## 2024-02-28 RX ADMIN — ASPIRIN 81 MG: 81 TABLET, COATED ORAL at 09:15

## 2024-02-28 RX ADMIN — ACETAMINOPHEN 650 MG: 325 TABLET ORAL at 00:19

## 2024-02-28 RX ADMIN — Medication 1 TABLET: at 12:01

## 2024-02-28 ASSESSMENT — PAIN - FUNCTIONAL ASSESSMENT: PAIN_FUNCTIONAL_ASSESSMENT: ACTIVITIES ARE NOT PREVENTED

## 2024-02-28 ASSESSMENT — PAIN DESCRIPTION - DESCRIPTORS: DESCRIPTORS: ACHING;DULL

## 2024-02-28 ASSESSMENT — PAIN SCALES - GENERAL
PAINLEVEL_OUTOF10: 0
PAINLEVEL_OUTOF10: 7

## 2024-02-28 ASSESSMENT — PAIN SCALES - WONG BAKER: WONGBAKER_NUMERICALRESPONSE: 0

## 2024-02-28 ASSESSMENT — PAIN DESCRIPTION - ORIENTATION: ORIENTATION: POSTERIOR

## 2024-02-28 ASSESSMENT — PAIN DESCRIPTION - LOCATION: LOCATION: HEAD

## 2024-02-28 NOTE — PROGRESS NOTES
facilitate safe return to prior level of function.     Diet Solids Recommendation:   Liquid Consistency Recommendation:   Recommended Form of Meds:   Dysphagia III Soft and bite sized     Thin liquids     Meds crushed as able in puree      Continue to provide intermittent feeding assistance for verbal cues to encourage intake and provide assistance for visual neglect.     Recommended Compensatory Swallowing Strategies: Alternate solids/liquids , Check for pocketing of food L, Upright as possible with all PO intake , No straws , Assist Feed , Small bites/sips , Lingual Sweeps , Eat/feed slowly, Remain upright 30-45 min , Aspiration Precautions , oral care following meals as appropriate (pt at risk for pocketing on L)      Plan  Frequency: 60 minutes/day; 5 days per week, as tolerated, until goals met, or discharged from ARU.  Therapeutic Interventions: Kramer Water Protocol, Oral Care, Diet Tolerance Monitoring , Patient/Family Education , Therapeutic Trials with SLP , Instrumental assessment of swallow function (Modified Barium Swallow Study)  Speech / Motor Planning / Voice intervention , Cognitive-Linguistic intervention , and Patient/ Family education   Discharge Recommendations: Home with assistance and 24 hour supervision  Continued SLP at Discharge: Yes     Goals  Patient Goals: \"everything\"   Time Frame: 3 weeks    Pt will tolerate recommended diet and advanced trials with no overt s/s of aspiration.   Ongoing  Patient will demonstrate comprehension and appropriate implementation of Kramer water protocol.   GOAL MET; upgraded to thin liquids  Pt will improve oral motor strength and ROM for coordinated and alternating motions, via graded tasks to improve speech back to baseline level as subjectively rated by SLP/pt and family.    Ongoing  Patient will complete functional problem-solving tasks for daily situations with 85% accuracy or given min cues.   Ongoing  Pt will improve attention to detail for graded  complex information to 80%, for improved recall and retention of newly learned information   Ongoing  Pt will complete word associative tasks to improve mental flexibility, complex thinking, and working memory skills with 80% accuracy.     Ongoing  Pt will improve functional visual scanning for reading comprehension and functional visual task completion with incorporation of compensatory strategies, to mild cues    Ongoing      Therapy Session Time      Session 1 Session 2   Time In 0800 1133   Time Out 0845 1203   Time Code Minutes 10 30   Individual Minutes 45 30     Timed Code Treatment Minutes:  40  Total Treatment Minutes:  75    Electronically Signed By:   SONYA Cox  Speech-Language Pathology Graduate Clinician    The speech-language pathologist was present, directed the patient's care, made skilled judgment and was responsible for assessment and treatment.   Glenny Canseco M.A. [facility] [id number]  Speech-Language Pathologist   2/28/2024 9:04 AM

## 2024-02-28 NOTE — PROGRESS NOTES
Nutrition Note    RECOMMENDATIONS  PO Diet: CCC, Dysphagia soft & bite sized  ONS: Glucerna TID, no strawberry     NUTRITION ASSESSMENT   Nutrition intervention for f/u assessment.  Pt receives a CCC, dysphagia soft & bite sized diet & reports appetite slowly improving.  Per Staff, pt needs cues to feed self adequately, especially d/t low endurance.  May also require assistance with feeding if pt becomes too tired.  Pt likes GLucerna, but only vanilla & chocolate.  Continues to receive strawberry, which she will not drink. Reviewed flavor preference with dietary staff.  Will con't to monitor & encourage po/supplement intake.     Nutrition Related Findings: gluc 163; LBM 2/27; no edema  Wounds: None  Nutrition Education:  Education not indicated   Nutrition Goals: PO intake 50% or greater, prior to discharge     MALNUTRITION ASSESSMENT   Acute Illness  Malnutrition Status: No malnutrition    NUTRITION DIAGNOSIS   Increased nutrient needs related to increase demand for energy/nutrients as evidenced by other (comment) (in ARU for strength & conditioning)      CURRENT NUTRITION THERAPIES  ADULT ORAL NUTRITION SUPPLEMENT; Breakfast, Lunch, Dinner; Diabetic Oral Supplement  ADULT DIET; Dysphagia - Soft and Bite Sized; 5 carb choices (75 gm/meal); No Drinking Straws; no scrambles eggs; SEND SALT/PEPPER & yellow or blue sweetener with all meals.     PO Intake: 26-50%   PO Supplement Intake:51-75%    ANTHROPOMETRICS  Current Height: 157.5 cm (5' 2\")  Current Weight - Scale: 87.3 kg (192 lb 6.4 oz)    Ideal Body Weight (IBW): 110 lbs  (50 kg)        BMI: 35.1      COMPARATIVE STANDARDS  Total Energy Requirements (kcals/day): 1313 - 1575     Protein (g):  60 -100       Fluid (mL/day):  1575    The patient will be monitored per nutrition standards of care. Consult dietitian if additional nutrition interventions are needed prior to RD reassessment.     Adrianne Mehta, SERGIO, LD    Contact: [phone]

## 2024-02-28 NOTE — PLAN OF CARE
Problem: Safety - Adult  Goal: Free from fall injury  2/28/2024 0958 by Iveth Mix RN  Outcome: Progressing  Note: Pt remains free from falls.  Safety precautions in place.  Bed in lowest position, bed/chair wheels locked, call light with in reach, bedside table in reach, bed/chair alarm on, fall risk wrist band on.

## 2024-02-28 NOTE — PROGRESS NOTES
Urology Progress Note  [facility]     Patient: Rissa Becerra MRN: [id number]  Room/Bed: [facility]-4905/4905-01   YOB: 1950  Age/Sex: 73 y.o.female  Admission Date: 2/16/2024     Date of Service:  2/28/2024    ASSESSMENT/PLAN     No diagnosis found.74 yo male with acute R MCA, currently in rehab. Urology consulted for urinary retention. Has failed one other voiding trial inpatient. Has had the rubio since 02/16/2024.    ==  Rubio removed this morning, still has yet to void     Recommendations:  Continue flomax  Give the patient some time this morning/early afternoon to void. Bladder scan this afternoon. Str cath residuals >450 ccs. Replace rubio per protocol   Will follow call with questions     All patient questions were answered. She understands the plan as listed above.    SUBJECTIVE     Chief Complaint: No chief complaint on file.      24 Hour Events: Today patient reports not voiding yet.  Otherwise symptoms are overall improved and pain is adequately controlled.  Denies nausea/vomiting.      OBJECTIVE     Hospital Problem List:  Principal Problem:    Acute cerebrovascular accident (CVA) due to ischemia (HCC)  Resolved Problems:    * No resolved hospital problems. *      Physical Exam:  Vitals:    02/28/24 0845   BP: (!) 148/78   Pulse: 66   Resp: 16   Temp: 97.4 °F (36.3 °C)   SpO2: 98%     CONSTITUTIONAL: The patient is well nourished/developed, with no distress noted.   NEUROLOGICAL/PSYCHIATRIC: Oriented to place and time, normal affected noted.   CARDIOVASCULAR: Regular rate and rhythm, no evidence of swelling noted.   RESPIRATORY: Normal respiratory effort with no wheezing noted.   ABDOMEN: Abdomen soft, non-tender, non-distended. No enlarged liver or spleen. No hernias noted.   GENITOURINARY: Deferred    Ins/Outs:    Intake/Output Summary (Last 24 hours) at 2/28/2024 1118  Last data filed at 2/28/2024 0720  Gross per 24 hour   Intake --   Output 825 ml   Net -825 ml  Radiation Exposure Index: Kerma mGy, 11.464 COMPARISON: None HISTORY: ORDERING SYSTEM PROVIDED HISTORY: new CVA, concern for aspiration TECHNOLOGIST PROVIDED HISTORY: Reason for exam:->new CVA, concern for aspiration Reason for Exam: concern for aspiration FINDINGS: Moderate residual material within the oropharynx with thicker substances. Recurrent laryngeal penetration was observed with thin barium and nectar thick barium.  No subglottic tracheal aspiration.  The patient did not demonstrate penetration or aspiration with thicker barium substances.     Penetration with thin and nectar thick barium.  No subglottic tracheal aspiration. Please see separate speech pathology report for full discussion of findings and recommendations.     XR SHOULDER LEFT (MIN 2 VIEWS)    Result Date: 2/13/2024  EXAMINATION: XRAY VIEWS OF THE LEFT SHOULDER 2/13/2024 11:49 am COMPARISON: None. HISTORY: ORDERING SYSTEM PROVIDED HISTORY: fall/bruising TECHNOLOGIST PROVIDED HISTORY: Reason for exam:->fall/bruising Reason for Exam: Fall/bruising FINDINGS: Three views of the left shoulder demonstrates malalignment on the Y-view only there is minimal internal and external rotation.  No obvious fracture.  The visualized lungs and ribs are normal.     Malalignment on the Y-view is concerning for dislocation/subluxation although none is identified on the AP views.  Consider axillary Y-view and/or CT scan to further evaluate..     XR ANKLE LEFT (2 VIEWS)    Result Date: 2/13/2024  EXAMINATION: XRAY VIEWS OF THE LEFT ANKLE 2/13/2024 11:49 am COMPARISON: 12 February 2024 HISTORY: ORDERING SYSTEM PROVIDED HISTORY: 1 view; stress view please to eval syndesmosis TECHNOLOGIST PROVIDED HISTORY: Reason for exam:->1 view; stress view please to eval syndesmosis Reason for Exam: Fall/bruising FINDINGS: Overlying fiberglass splint posteriorly.  Oblique fracture of the distal fibula.  Slight asymmetry of the joint space.  Osteochondral lesion along the medial

## 2024-02-28 NOTE — PROGRESS NOTES
[facility] - Inpatient Rehabilitation Department   Phone: [phone]    Occupational Therapy    [] Initial Evaluation            [x] Daily Treatment Note         [] Discharge Summary      Patient: Rissa Becerra   : 1950   MRN: [id number]   Date of Service:  2024    Admitting Diagnosis:  Acute cerebrovascular accident (CVA) due to ischemia (HCC)  Current Admission Summary: Rissa Becerra is a 73 y.o. female with PMHx notable for HTN, HLD, type 2 diabetes mellitus, CHF, recent R MCA stroke (seen at OSH, started on DAPT and discharged home), who presented on 24 with worsening facial droop, left visual field loss, left sided weakness. She was ambulating with device, with minimal left sided weakness at time of prior hospital discharge, gradually developed worsening weakness resulting in several falls, including one causing L ankle fracture. She returned to [facility] ED. CT Head showed R frontal hypoattenuation. CTA Head/Neck showed L PCA occlusion, R M2 and M3 segmental narrowing. MRI Brain showed R MCA territory infarct. Echo with LVEF 55-60%, negative bubble study. Neurology was consulted, recommending DAPT, statin, cardiac event monitor. Orthopedics Concomitant injuries included: L ankle fracture, for which Ortho was consulted, recommending NWB LLE and non-op management in the context of recent stroke. Hospital course complicated by: aspiration pneumonia, L shoulder pain (CT L shoulder negative for fracture), urinary retention.      Patient reports that she is doing well today. She denies any fevers, chills, chest pain, shortness of breath. Endorses stable L visual field deficits, dysarthria, dysphagia, left sided weakness. Endorses left ankle pain. Endorses urinary retention, requiring straight cath since Kilgore catheter discontinued.      Past Medical History:  has a past medical history of Arthropathy, Asthma, Bell's palsy, Bronchitis, Diabetes mellitus (HCC), Diverticulitis,

## 2024-02-28 NOTE — PROGRESS NOTES
Removed rubio cath.  Removed 7 cc of fluid from bag.  Tolerated procedure without difficulty.  Expressed no c/o

## 2024-02-28 NOTE — PROGRESS NOTES
Rissa Becerra  2/28/2024  [id number]    Chief Complaint: Acute cerebrovascular accident (CVA) due to ischemia (HCC)    Subjective:   Patient reports she is doing well today. Kilgore catheter removed. Feeling bladder fullness, but yet to void. Reports buttock/sacral pain. Per patient's daughter, no skin breakdown on last skin check with nursing. Interested in a bariatric recliner, and tilt-in-space wheelchair for better pressure relief.     Last BM: Stool Occurrence: 1 (02/27/24 2011)  PRNs administered past 24h: Pepcid    Objective:  Patient Vitals for the past 24 hrs:   BP Temp Temp src Pulse Resp SpO2 Weight   02/28/24 0845 (!) 148/78 97.4 °F (36.3 °C) Oral 66 16 98 % --   02/28/24 0600 -- -- -- -- -- -- 87.3 kg (192 lb 6.4 oz)   02/27/24 2000 115/68 98.2 °F (36.8 °C) Oral 70 18 91 % --   02/27/24 1658 128/76 -- -- 70 -- -- --       Gen: No distress, pleasant.   HEENT: Normocephalic, atraumatic.   CV: Regular rate and rhythm. Extremities warm, well perfused.   Resp: No respiratory distress. CTAB.  Abd: Soft, nontender.  Ext: Left lower leg in splint with ACE wrap.  Neuro: Alert, oriented, appropriately interactive. Left upper extremity 0/5. Left lower extremity 1/5 in hip extension, hip adduction. MAS 1 to 1+ LUE elbow flexors, wrist flexors.     Laboratory data: Available via EMR.     Therapy progress:       PT    Supine to Sit: Dependent  Sit to Supine: Dependent   Sit to Stand:    Chair/Bed to Chair Transfer: Dependent  Car Transfer:    Ambulation 10 ft:    Ambulation 50 ft:    Ambulation 150 ft:    Stairs - 1 Step:    Stairs - 4 Step:    Stairs - 12 Step:      OT    Eating: Substantial/maximal assistance  Oral Hygiene: Dependent  Bathing: Dependent  Upper Body Dressing: Substantial/maximal assistance  Lower Body Dressing: Dependent  Toilet Transfer: Dependent  Toilet Hygiene: Dependent    Speech Therapy    Pt presents with mild/moderate cognitive decline characterized by deficits in thought organization,  attention, memory, and problem solving in addition to left visual neglect. The pt also presents with mild/moderate motor speech deficits related to impaired left labial weakness and incoordination resulting in decreased rate, blended word boundaries, decreased breath support, and reduced phonation/respiration coordination and impacting speech intelligibility. Left weakness is also contributing to oropharyngeal dysphagia however pt has been responsive to NMES via Vital Stim to improve oropharyngeal function. Pt appears to be tolerating dysphagia diet with introduction of water protocol and trials of thin liquids. Plan for ongoing trials of thin liquids and advanced solids prior to diet upgrade. Recommend speech language, cognitive linguistic, and dysphagia therapy to address deficits and facilitate safe return to prior level of function.    Body mass index is 35.19 kg/m².    Assessment:    This patient continues to require an ARU level of care from all disciplines to address the following issues:    Patient Active Problem List   Diagnosis    Localized edema    Diabetic polyneuropathy (HCC)    Acute CVA (cerebrovascular accident) ([facility])    Left hemiplegia (HCC)    Dysphagia    CAD (coronary artery disease)    DM2 (diabetes mellitus, type 2) ([facility])    HTN (hypertension), benign    Obesity (BMI 30-39.9)    Acute left-sided weakness    Arterial ischemic stroke, ICA, right, acute (HCC)    DM (diabetes mellitus), secondary, with complications ([facility])    Dyslipidemia    Closed displaced fracture of lateral malleolus of left fibula    Acute cerebrovascular accident (CVA) due to ischemia ([facility])       Functional progress: Still requiring 2 person assist with transfers.  Mild improvements noted in sitting balance, tendency to push towards her paretic side.    Plan:  #. Acute R MCA territory ischemic stroke  - thromoembolic vs cardioembolic  - MRI Brain w/ acute R MCA infarct  - Echo with LVEF 55-60%, indeterminate diastolic

## 2024-02-28 NOTE — PROGRESS NOTES
[facility] - Inpatient Rehabilitation Department   Phone: [phone]    Physical Therapy    [] Initial Evaluation            [x] Daily Treatment Note         [] Discharge Summary      Patient: Rissa Becerra   : 1950   MRN: [id number]   Date of Service:  2024  Admitting Diagnosis: Acute cerebrovascular accident (CVA) due to ischemia (HCC)  Current Admission Summary: Rissa Becerra is a 73 y.o. female with PMHx notable for HTN, HLD, type 2 diabetes mellitus, CHF, recent R MCA stroke (seen at OSH, started on DAPT and discharged home), who presented on 24 with worsening facial droop, left visual field loss, left sided weakness. She was ambulating with device, with minimal left sided weakness at time of prior hospital discharge, gradually developed worsening weakness resulting in several falls, including one causing L ankle fracture. She returned to [facility] ED. CT Head showed R frontal hypoattenuation. CTA Head/Neck showed L PCA occlusion, R M2 and M3 segmental narrowing. MRI Brain showed R MCA territory infarct. Neurology was consulted, recommending DAPT, statin, cardiac event monitor. Orthopedics Concomitant injuries included: L ankle fracture, for which Ortho was consulted, recommending NWB LLE and non-op management in the context of recent stroke. Hospital course complicated by: aspiration pneumonia.   Past Medical History:  has a past medical history of Arthropathy, Asthma, Bell's palsy, Bronchitis, Diabetes mellitus (HCC), Diverticulitis, Hyperlipidemia, Hypertension, Migraine, Polyneuropathy in diabetes ([facility]), and Vertigo.  Past Surgical History:  has a past surgical history that includes hernia repair; Carpal tunnel release; Total knee arthroplasty; Rotator cuff repair; and Percutaneous Transluminal Coronary Angio (2023).  Discharge Recommendations: To be determined with progress   DME Required For Discharge: DME to be determined pending patient

## 2024-02-28 NOTE — PATIENT CARE CONFERENCE
[facility]  Inpatient Rehabilitation  Weekly Team Conference Note    Patient Name: Rissa Becerra        MRN: [id number]    : 1950  (73 y.o.)  Gender: female           The team conference for this patient was held on 24 at 1100 am and led by:  Ottoniel Greene MD     CASE MANAGEMENT:  Assessment:   Patient is a 73 year old female who admitted to ARU on 2024 with the admitting diagnosis of  Acute cerebrovascular accident (CVA) due to ischemia (HCC)Patient lives with her spouse and they reside in a one story home. Spouse is able to assist the patient in the home. Patient has 7 children.  Children provide support and care. 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 Patient also gives support in clerical/office needs.  Patient has not been active with any community support programs but is interested in being referred to recommended resources. Patient continues to benefit from skilled PT/OT/SLP to promote increased safety and independence in order to return to her prior level of functioning. Patient's discharge plan will be determined with patient's progress.      PHYSICAL THERAPY:    Bed Mobility:  Rolling Left: minimal assistance  Rolling Right: moderate assistance  Comments: Completed on flat bed with use of HR.  Completed multiple times at start of session for LB clothing management, pericare, and lift pad placement    Transfers:  Slide board transfer: 2 person assistance with mod (A) of 2 with completion to (R) from w/c <=> therapy mat   Dependent transfer completed with mechanical maxisky/maximove lift system from bed to  w/c and w/c to recliner chair.  Comments: Patient requires dependent assistance for placement of slide board.  Improved postural control and attempted initiation of scooting.    Ambulation:  Ambulation not tested on this date secondary to extensive assist required to maintain static

## 2024-02-29 LAB
ANION GAP SERPL CALCULATED.3IONS-SCNC: 11 MMOL/L (ref 3–16)
BUN SERPL-MCNC: 27 MG/DL (ref 7–20)
CALCIUM SERPL-MCNC: 9.4 MG/DL (ref 8.3–10.6)
CHLORIDE SERPL-SCNC: 101 MMOL/L (ref 99–110)
CO2 SERPL-SCNC: 24 MMOL/L (ref 21–32)
CREAT SERPL-MCNC: 0.5 MG/DL (ref 0.6–1.2)
GFR SERPLBLD CREATININE-BSD FMLA CKD-EPI: >60 ML/MIN/{1.73_M2}
GLUCOSE BLD-MCNC: 121 MG/DL (ref 70–99)
GLUCOSE BLD-MCNC: 147 MG/DL (ref 70–99)
GLUCOSE BLD-MCNC: 181 MG/DL (ref 70–99)
GLUCOSE BLD-MCNC: 255 MG/DL (ref 70–99)
GLUCOSE SERPL-MCNC: 142 MG/DL (ref 70–99)
PERFORMED ON: ABNORMAL
POTASSIUM SERPL-SCNC: 4.9 MMOL/L (ref 3.5–5.1)
SODIUM SERPL-SCNC: 136 MMOL/L (ref 136–145)

## 2024-02-29 PROCEDURE — 51798 US URINE CAPACITY MEASURE: CPT

## 2024-02-29 PROCEDURE — 97530 THERAPEUTIC ACTIVITIES: CPT

## 2024-02-29 PROCEDURE — 94761 N-INVAS EAR/PLS OXIMETRY MLT: CPT

## 2024-02-29 PROCEDURE — 1280000000 HC REHAB R&B

## 2024-02-29 PROCEDURE — 6370000000 HC RX 637 (ALT 250 FOR IP): Performed by: STUDENT IN AN ORGANIZED HEALTH CARE EDUCATION/TRAINING PROGRAM

## 2024-02-29 PROCEDURE — 97112 NEUROMUSCULAR REEDUCATION: CPT

## 2024-02-29 PROCEDURE — 80048 BASIC METABOLIC PNL TOTAL CA: CPT

## 2024-02-29 PROCEDURE — 97535 SELF CARE MNGMENT TRAINING: CPT

## 2024-02-29 PROCEDURE — 97129 THER IVNTJ 1ST 15 MIN: CPT

## 2024-02-29 PROCEDURE — 92507 TX SP LANG VOICE COMM INDIV: CPT

## 2024-02-29 PROCEDURE — 36415 COLL VENOUS BLD VENIPUNCTURE: CPT

## 2024-02-29 PROCEDURE — 97130 THER IVNTJ EA ADDL 15 MIN: CPT

## 2024-02-29 PROCEDURE — 51701 INSERT BLADDER CATHETER: CPT

## 2024-02-29 PROCEDURE — 6360000002 HC RX W HCPCS: Performed by: STUDENT IN AN ORGANIZED HEALTH CARE EDUCATION/TRAINING PROGRAM

## 2024-02-29 PROCEDURE — 92526 ORAL FUNCTION THERAPY: CPT

## 2024-02-29 RX ORDER — LIDOCAINE HYDROCHLORIDE 20 MG/ML
JELLY TOPICAL PRN
Status: DISCONTINUED | OUTPATIENT
Start: 2024-02-29 | End: 2024-03-08 | Stop reason: HOSPADM

## 2024-02-29 RX ADMIN — DULOXETINE HYDROCHLORIDE 30 MG: 30 CAPSULE, DELAYED RELEASE ORAL at 09:09

## 2024-02-29 RX ADMIN — ASPIRIN 81 MG: 81 TABLET, COATED ORAL at 09:09

## 2024-02-29 RX ADMIN — INSULIN LISPRO 4 UNITS: 100 INJECTION, SOLUTION INTRAVENOUS; SUBCUTANEOUS at 12:20

## 2024-02-29 RX ADMIN — ACETAMINOPHEN 650 MG: 325 TABLET ORAL at 02:19

## 2024-02-29 RX ADMIN — SACUBITRIL AND VALSARTAN 1 TABLET: 24; 26 TABLET, FILM COATED ORAL at 09:09

## 2024-02-29 RX ADMIN — ACETAMINOPHEN 650 MG: 325 TABLET ORAL at 21:01

## 2024-02-29 RX ADMIN — CLOPIDOGREL 75 MG: 75 TABLET, FILM COATED ORAL at 09:09

## 2024-02-29 RX ADMIN — TAMSULOSIN HYDROCHLORIDE 0.4 MG: 0.4 CAPSULE ORAL at 20:31

## 2024-02-29 RX ADMIN — SACUBITRIL AND VALSARTAN 1 TABLET: 24; 26 TABLET, FILM COATED ORAL at 20:31

## 2024-02-29 RX ADMIN — ATORVASTATIN CALCIUM 80 MG: 80 TABLET, FILM COATED ORAL at 20:31

## 2024-02-29 RX ADMIN — SPIRONOLACTONE 12.5 MG: 25 TABLET ORAL at 09:09

## 2024-02-29 RX ADMIN — ACETAMINOPHEN 650 MG: 325 TABLET ORAL at 17:04

## 2024-02-29 RX ADMIN — CARVEDILOL 12.5 MG: 6.25 TABLET, FILM COATED ORAL at 17:00

## 2024-02-29 RX ADMIN — Medication 1 TABLET: at 09:07

## 2024-02-29 RX ADMIN — FUROSEMIDE 20 MG: 20 TABLET ORAL at 09:09

## 2024-02-29 RX ADMIN — LIDOCAINE HYDROCHLORIDE: 20 JELLY TOPICAL at 13:40

## 2024-02-29 RX ADMIN — NYSTATIN OINTMENT: 100000 OINTMENT TOPICAL at 10:33

## 2024-02-29 RX ADMIN — ENOXAPARIN SODIUM 40 MG: 100 INJECTION SUBCUTANEOUS at 09:21

## 2024-02-29 RX ADMIN — METFORMIN HYDROCHLORIDE 1000 MG: 500 TABLET ORAL at 17:00

## 2024-02-29 RX ADMIN — CHOLECALCIFEROL TAB 25 MCG (1000 UNIT) 1000 UNITS: 25 TAB at 09:09

## 2024-02-29 RX ADMIN — INSULIN GLARGINE 36 UNITS: 100 INJECTION, SOLUTION SUBCUTANEOUS at 09:07

## 2024-02-29 RX ADMIN — Medication 2 CAPSULE: at 09:08

## 2024-02-29 RX ADMIN — CARVEDILOL 12.5 MG: 6.25 TABLET, FILM COATED ORAL at 09:08

## 2024-02-29 RX ADMIN — NYSTATIN OINTMENT: 100000 OINTMENT TOPICAL at 20:33

## 2024-02-29 RX ADMIN — CETIRIZINE HYDROCHLORIDE 10 MG: 10 TABLET, FILM COATED ORAL at 09:33

## 2024-02-29 RX ADMIN — METFORMIN HYDROCHLORIDE 1000 MG: 500 TABLET ORAL at 09:21

## 2024-02-29 ASSESSMENT — PAIN DESCRIPTION - ORIENTATION
ORIENTATION: LEFT
ORIENTATION: MID
ORIENTATION: MID

## 2024-02-29 ASSESSMENT — PAIN SCALES - GENERAL
PAINLEVEL_OUTOF10: 3
PAINLEVEL_OUTOF10: 4
PAINLEVEL_OUTOF10: 4
PAINLEVEL_OUTOF10: 2
PAINLEVEL_OUTOF10: 4
PAINLEVEL_OUTOF10: 3
PAINLEVEL_OUTOF10: 3
PAINLEVEL_OUTOF10: 4
PAINLEVEL_OUTOF10: 0

## 2024-02-29 ASSESSMENT — PAIN DESCRIPTION - DESCRIPTORS
DESCRIPTORS: ACHING
DESCRIPTORS: ACHING;DISCOMFORT;HEAVINESS
DESCRIPTORS: ACHING
DESCRIPTORS: ACHING

## 2024-02-29 ASSESSMENT — PAIN DESCRIPTION - LOCATION
LOCATION: LEG;HEAD
LOCATION: HEAD

## 2024-02-29 ASSESSMENT — PAIN DESCRIPTION - INTENSITY: RATING_2: 4

## 2024-02-29 ASSESSMENT — PAIN SCALES - WONG BAKER
WONGBAKER_NUMERICALRESPONSE: 2
WONGBAKER_NUMERICALRESPONSE: 2
WONGBAKER_NUMERICALRESPONSE: 0

## 2024-02-29 ASSESSMENT — PAIN DESCRIPTION - PAIN TYPE: TYPE: ACUTE PAIN

## 2024-02-29 ASSESSMENT — PAIN DESCRIPTION - FREQUENCY
FREQUENCY: INTERMITTENT
FREQUENCY: INTERMITTENT

## 2024-02-29 ASSESSMENT — PAIN DESCRIPTION - ONSET: ONSET: GRADUAL

## 2024-02-29 ASSESSMENT — PAIN - FUNCTIONAL ASSESSMENT
PAIN_FUNCTIONAL_ASSESSMENT: PREVENTS OR INTERFERES SOME ACTIVE ACTIVITIES AND ADLS
PAIN_FUNCTIONAL_ASSESSMENT: ACTIVITIES ARE NOT PREVENTED

## 2024-02-29 NOTE — PLAN OF CARE
Problem: Discharge Planning  Goal: Discharge to home or other facility with appropriate resources  2/29/2024 1013 by Briana Anderson, RN  Outcome: Progressing     Problem: Safety - Adult  Goal: Free from fall injury  2/29/2024 1013 by Briana Anderson RN  Outcome: Progressing     Problem: Skin/Tissue Integrity  Goal: Absence of new skin breakdown  Description: 1.  Monitor for areas of redness and/or skin breakdown  2.  Assess vascular access sites hourly  3.  Every 4-6 hours minimum:  Change oxygen saturation probe site  4.  Every 4-6 hours:  If on nasal continuous positive airway pressure, respiratory therapy assess nares and determine need for appliance change or resting period.  2/29/2024 1013 by Briana Anderson, RN  Outcome: Progressing

## 2024-02-29 NOTE — PROGRESS NOTES
[facility] - Inpatient Rehabilitation Department   Phone: [phone]    Speech Therapy    [] Initial Evaluation            [x] Daily Treatment Note         [] Discharge Summary      Patient: Rissa Becerra   : 1950   MRN: [id number]   Date of Service:  2024  Admitting Diagnosis: Acute cerebrovascular accident (CVA) due to ischemia (HCC)  Current Admission Summary: Rissa Becerra is a 73 y.o. female with history of DM 2, CAD, chronic combined CHF, HTN, recent diagnosis of stroke at [facility] a few days came to ER with complaints of worsening L sided weakness and falls.  Patient has slurred speech and L facial droop at [facility].  She was discharged to home with home care.  Family is very supportive.  Noted she had progressing weakness and falls.  Today, noted to have flaccid LUE/LLE.  Brought to ER for evaluation.  No CP, SOB, HA or dizziness.  Patient is awake and answering questions.  , daughters and son are at bedside with her today.  Otherwise complete ROS is negative unless listed above.   Past Medical History:  has a past medical history of Arthropathy, Asthma, Bell's palsy, Bronchitis, Diabetes mellitus (HCC), Diverticulitis, Hyperlipidemia, Hypertension, Migraine, Polyneuropathy in diabetes ([facility]), and Vertigo.  Past Surgical History:  has a past surgical history that includes hernia repair; Carpal tunnel release; Total knee arthroplasty; Rotator cuff repair; and Percutaneous Transluminal Coronary Angio (2023).  Recent Chest xray: Decreased perihilar airspace disease on the right   Recent MRI Brain: 1. Scattered areas of acute infarct within the right MCA territory.  No  significant mass effect or midline shift.  2. Otherwise, no acute intracranial abnormality.  3. Mild to moderate global parenchymal volume loss with mild chronic  microvascular ischemic changes.  Instrumental Swallow Study: Modified Barium Swallow evaluation completed on 2024.Patient  problem-solving tasks for daily situations with 85% accuracy or given min cues.   Ongoing  Pt will improve attention to detail for graded complex information to 80%, for improved recall and retention of newly learned information. Progressing, continue   Pt will complete word associative tasks to improve mental flexibility, complex thinking, and working memory skills with 80% accuracy.  Ongoing  Pt will improve functional visual scanning for reading comprehension and functional visual task completion with incorporation of compensatory strategies, to mild cues. Progressing, continue       Therapy Session Time      Session 1 Session 2   Time In 0800    Time Out 0900    Time Code Minutes 24    Individual Minutes 60      Timed Code Treatment Minutes: 24  Total Treatment Minutes:  60    Electronically Signed By:   SONYA Cox  Speech-Language Pathology Graduate Clinician    The speech-language pathologist was present, directed the patient's care, made skilled judgment and was responsible for assessment and treatment.     Suzanne Rogers M.A. CCC-SLP #[id number] 2/29/2024 10:14 AM  Speech-Language Pathologist

## 2024-02-29 NOTE — PROGRESS NOTES
Shift assessment done. All night time medications given and patient tolerated well. All fall precautions implemented. All needs attended. Electronically signed by Cathie Hampton RN on 2/28/2024 at 9:40 PM

## 2024-02-29 NOTE — PROGRESS NOTES
[facility] - Inpatient Rehabilitation Department   Phone: [phone]    Occupational Therapy    [] Initial Evaluation            [x] Daily Treatment Note         [] Discharge Summary      Patient: Rissa Becerra   : 1950   MRN: [id number]   Date of Service:  2024    Admitting Diagnosis:  Acute cerebrovascular accident (CVA) due to ischemia (HCC)  Current Admission Summary: Rissa Becerra is a 73 y.o. female with PMHx notable for HTN, HLD, type 2 diabetes mellitus, CHF, recent R MCA stroke (seen at OSH, started on DAPT and discharged home), who presented on 24 with worsening facial droop, left visual field loss, left sided weakness. She was ambulating with device, with minimal left sided weakness at time of prior hospital discharge, gradually developed worsening weakness resulting in several falls, including one causing L ankle fracture. She returned to [facility] ED. CT Head showed R frontal hypoattenuation. CTA Head/Neck showed L PCA occlusion, R M2 and M3 segmental narrowing. MRI Brain showed R MCA territory infarct. Echo with LVEF 55-60%, negative bubble study. Neurology was consulted, recommending DAPT, statin, cardiac event monitor. Orthopedics Concomitant injuries included: L ankle fracture, for which Ortho was consulted, recommending NWB LLE and non-op management in the context of recent stroke. Hospital course complicated by: aspiration pneumonia, L shoulder pain (CT L shoulder negative for fracture), urinary retention.      Patient reports that she is doing well today. She denies any fevers, chills, chest pain, shortness of breath. Endorses stable L visual field deficits, dysarthria, dysphagia, left sided weakness. Endorses left ankle pain. Endorses urinary retention, requiring straight cath since Kilgore catheter discontinued.      Past Medical History:  has a past medical history of Arthropathy, Asthma, Bell's palsy, Bronchitis, Diabetes mellitus (HCC), Diverticulitis,  Balance: poor (+): requires min (A) to maintain balance  Dynamic Sitting Balance: poor: requires mod (A) to maintain balance  Comments: CGA to Viviana for static; min to ModA for dynamic during BADL completion    Additional Activity:         Second Session:   Patient in bed upon arrival, pleasant and agreeable to OT/PT session. No reports of pain, only reporting mild headache. Son present for session.     Patient rolled with min-modA in order to completed LB dressing and to place maxi yoseph lift pad. Total A for LB dressing. Patient transferred from bed to  with maxisky.     Slide board transfer from  to mat table with modA of 2 (going to R). Patient participated in seated activities edge of mat table in order to facilitate upright posture, find midline and promote weight bearing through LUE. Patient required CGA-Viviana for static sitting balance and mod-maxA of 1 + modA of 1 for dynamic sitting balance. Activities included: static sitting, lateral forearm props, forward swiss ball rollouts, alternating diagonal swiss ball roll outs, and modified sit ups. Patient required verbal and tactile cueing throughout.     maxA progressing to dependent of 2 for slide board from mat table to  (going to L). Maxi yoseph from  to recliner. Patient left in recliner with alarm on, call button in reach and all needs met.     Cognition  Overall Cognitive Status: Impaired  Arousal/Alertness: delayed responses to stimuli  Following Commands: follows one step commands consistently, follows multi step commands with repetition, follows multi step commands with increased time  Attention Span: attends with cues to redirect, difficulty attending to directions  Memory: decreased recall of recent events, decreased short term memory  Insights: decreased awareness of deficits  Initiation: requires cues for some  Sequencing: requires cues for some  Comments: easily distracted in multi-stim environments; continues to progress with ability to attend

## 2024-02-29 NOTE — CARE COORDINATION
PAUL met with patient and her daughter (Brianna [phone]) regarding patient's discharge plan. PT/OT/SLP recommending that patient be discharged to a SNF for continued rehab.  Patient/family requesting to be referred to :    [facility] and  [facility]/DOUG referred patient.    [facility]ANDO/DOUG will continue to follow up for support and discharge planning.    Electronically signed by ERI BRYSON on 2/29/2024 at 2:47 PM

## 2024-02-29 NOTE — PROGRESS NOTES
[facility] - Inpatient Rehabilitation Department   Phone: [phone]    Physical Therapy    [] Initial Evaluation            [x] Daily Treatment Note         [] Discharge Summary      Patient: Rissa Becerra   : 1950   MRN: [id number]   Date of Service:  2024  Admitting Diagnosis: Acute cerebrovascular accident (CVA) due to ischemia (HCC)  Current Admission Summary: Rissa Becerra is a 73 y.o. female with PMHx notable for HTN, HLD, type 2 diabetes mellitus, CHF, recent R MCA stroke (seen at OSH, started on DAPT and discharged home), who presented on 24 with worsening facial droop, left visual field loss, left sided weakness. She was ambulating with device, with minimal left sided weakness at time of prior hospital discharge, gradually developed worsening weakness resulting in several falls, including one causing L ankle fracture. She returned to [facility] ED. CT Head showed R frontal hypoattenuation. CTA Head/Neck showed L PCA occlusion, R M2 and M3 segmental narrowing. MRI Brain showed R MCA territory infarct. Neurology was consulted, recommending DAPT, statin, cardiac event monitor. Orthopedics Concomitant injuries included: L ankle fracture, for which Ortho was consulted, recommending NWB LLE and non-op management in the context of recent stroke. Hospital course complicated by: aspiration pneumonia.   Past Medical History:  has a past medical history of Arthropathy, Asthma, Bell's palsy, Bronchitis, Diabetes mellitus (HCC), Diverticulitis, Hyperlipidemia, Hypertension, Migraine, Polyneuropathy in diabetes ([facility]), and Vertigo.  Past Surgical History:  has a past surgical history that includes hernia repair; Carpal tunnel release; Total knee arthroplasty; Rotator cuff repair; and Percutaneous Transluminal Coronary Angio (2023).  Discharge Recommendations: To be determined with progress   DME Required For Discharge: DME to be determined pending patient  complete functional mobility at stand by assistance, in LRAD      Above goals reviewed on 2/29/2024.  All goals are ongoing at this time unless indicated above.      Therapy Session Time      Individual Group Co-treatment   Time In      0935   Time Out      1045   Minutes      70       Second Session Therapy Time:     Individual Group Co-treatment   Time In     1322   Time Out     1415   Minutes     53      Total Treatment Minutes:   123    Electronically Signed By: Josh Avitia PT, DPT - [id number], 2/29/2024 2:20 PM

## 2024-02-29 NOTE — CARE COORDINATION
Team conference held today. Spoke with patient and her daughter to discuss progress with therapy, barriers to discharge, and plans to return home. Estimated discharge date set for 3/08/2024. Patient anticipates discharging to a SNF.     SW/DOUG will continue to follow for support and discharge planning.     Electronically signed by ERI BRYSON on 2/29/2024 at 2:17 PM

## 2024-02-29 NOTE — PROGRESS NOTES
Bladder scan done showed 500 ml, straight cath done per Urology notes if > 450 ml from bladder scan. Straight cath done aseptically , output 350 ml  , post void cath residual 27 ml . Will monitor. Electronically signed by Cathie Hampton RN on 2/29/2024 at 2:31 AM

## 2024-02-29 NOTE — PROGRESS NOTES
Rissa Becerra  2/29/2024  [id number]    Chief Complaint: Acute cerebrovascular accident (CVA) due to ischemia (HCC)    Subjective:   Patient reports she is doing well today. Required multiple straight caths for volumes of 350-375 cc. No volitional voiding yet, continues to feel sensation of bladder fullness. Complains of discomfort with straight caths. Reports intermittent headaches, worse in the evenings and overnight. Partially relieved with PRN Tylenol. Denies any fevers, chills, chest pain, shortness of breath.     Last BM: Stool Occurrence: 1 (x-small) (02/28/24 1659)  PRNs administered past 24h: Tylenol    Objective:  Patient Vitals for the past 24 hrs:   BP Temp Temp src Pulse Resp SpO2 Weight   02/29/24 1041 -- -- -- -- 18 98 % --   02/29/24 0908 (!) 114/57 98.1 °F (36.7 °C) Oral 66 18 100 % --   02/29/24 0500 -- -- -- -- -- -- 87.6 kg (193 lb 3.2 oz)   02/28/24 2053 109/65 99 °F (37.2 °C) Oral 63 17 94 % --   02/28/24 1745 111/69 -- -- 64 -- -- --       Gen: No distress, pleasant.   HEENT: Normocephalic, atraumatic.   CV: Regular rate and rhythm. Extremities warm, well perfused.   Resp: No respiratory distress. CTAB.  Abd: Soft, nontender.  Ext: Left lower leg in splint with ACE wrap.  Neuro: Alert, oriented, appropriately interactive. Left upper extremity 0/5. Left lower extremity 1/5 in hip extension, hip adduction. MAS 1 to 1+ LUE elbow flexors, wrist flexors.     Laboratory data: Available via EMR.     Therapy progress:       PT    Supine to Sit: Dependent  Sit to Supine: Dependent   Sit to Stand:    Chair/Bed to Chair Transfer: Dependent  Car Transfer:    Ambulation 10 ft:    Ambulation 50 ft:    Ambulation 150 ft:    Stairs - 1 Step:    Stairs - 4 Step:    Stairs - 12 Step:      OT    Eating: Substantial/maximal assistance  Oral Hygiene: Dependent  Bathing: Dependent  Upper Body Dressing: Substantial/maximal assistance  Lower Body Dressing: Dependent  Toilet Transfer: Dependent  Toilet Hygiene:  Dependent    Speech Therapy    Pt continues with mild/moderate cognitive decline characterized by deficits in thought organization, attention, memory, and problem solving in addition to moderate to severe left visual neglect. Pt making improvements with her speech with improved labial coordination/ breath support improving her overall intelligibility within structured conversation. Pt's goals are to continue to work on pitch variation for singing. Pt improving with oral endurance and oral prep phase for tolerating advanced solids with ongoing encouragement and assistance with eating. Recently upgraded to a soft and bite sized diet. Continue to provide pt with more advanced solid trials with the goal for pt to return to a least restrictive diet. Continue with speech therapy for language, cognitive linguistic, and dysphagia therapy to address deficits and to enhance return to prior level of function.    Body mass index is 35.34 kg/m².    Assessment:    This patient continues to require an ARU level of care from all disciplines to address the following issues:    Patient Active Problem List   Diagnosis    Localized edema    Diabetic polyneuropathy (HCC)    Acute CVA (cerebrovascular accident) ([facility])    Left hemiplegia ([facility])    Dysphagia    CAD (coronary artery disease)    DM2 (diabetes mellitus, type 2) ([facility])    HTN (hypertension), benign    Obesity (BMI 30-39.9)    Acute left-sided weakness    Arterial ischemic stroke, ICA, right, acute (HCC)    DM (diabetes mellitus), secondary, with complications ([facility])    Dyslipidemia    Closed displaced fracture of lateral malleolus of left fibula    Acute cerebrovascular accident (CVA) due to ischemia ([facility])       Functional progress: Improving initiation of scooting during slide board transfers.     Plan:  #. Acute R MCA territory ischemic stroke  - thromoembolic vs cardioembolic  - MRI Brain w/ acute R MCA infarct  - Echo with LVEF 55-60%, indeterminate diastolic dysfunction, RV

## 2024-02-29 NOTE — PLAN OF CARE
Problem: Safety - Adult  Goal: Free from fall injury  2/28/2024 2139 by Cathie Hampton, RN  Outcome: Progressing  2/28/2024 0958 by Iveth Mix, RN  Outcome: Progressing  Note: Pt remains free from falls.  Safety precautions in place.  Bed in lowest position, bed/chair wheels locked, call light with in reach, bedside table in reach, bed/chair alarm on, fall risk wrist band on.       Problem: Skin/Tissue Integrity  Goal: Absence of new skin breakdown  Description: 1.  Monitor for areas of redness and/or skin breakdown  2.  Assess vascular access sites hourly  3.  Every 4-6 hours minimum:  Change oxygen saturation probe site  4.  Every 4-6 hours:  If on nasal continuous positive airway pressure, respiratory therapy assess nares and determine need for appliance change or resting period.  Outcome: Progressing     Problem: ABCDS Injury Assessment  Goal: Absence of physical injury  Outcome: Progressing     Problem: Pain  Goal: Verbalizes/displays adequate comfort level or baseline comfort level  Outcome: Progressing     Problem: Nutrition Deficit:  Goal: Optimize nutritional status  Outcome: Progressing  Flowsheets (Taken 2/28/2024 1221 by Adrianne Mehta, RD, LD)  Nutrient intake appropriate for improving, restoring, or maintaining nutritional needs:   Monitor oral intake, labs, and treatment plans   Recommend appropriate diets, oral nutritional supplements, and vitamin/mineral supplements     Problem: Discharge Planning  Goal: Discharge to home or other facility with appropriate resources  Outcome: Progressing  Flowsheets (Taken 2/28/2024 0845 by Iveth Mix, RN)  Discharge to home or other facility with appropriate resources: Identify barriers to discharge with patient and caregiver

## 2024-03-01 LAB
GLUCOSE BLD-MCNC: 119 MG/DL (ref 70–99)
GLUCOSE BLD-MCNC: 138 MG/DL (ref 70–99)
GLUCOSE BLD-MCNC: 152 MG/DL (ref 70–99)
GLUCOSE BLD-MCNC: 233 MG/DL (ref 70–99)
PERFORMED ON: ABNORMAL

## 2024-03-01 PROCEDURE — 6370000000 HC RX 637 (ALT 250 FOR IP): Performed by: STUDENT IN AN ORGANIZED HEALTH CARE EDUCATION/TRAINING PROGRAM

## 2024-03-01 PROCEDURE — 97110 THERAPEUTIC EXERCISES: CPT

## 2024-03-01 PROCEDURE — 51798 US URINE CAPACITY MEASURE: CPT

## 2024-03-01 PROCEDURE — 6370000000 HC RX 637 (ALT 250 FOR IP)

## 2024-03-01 PROCEDURE — 97530 THERAPEUTIC ACTIVITIES: CPT

## 2024-03-01 PROCEDURE — 1280000000 HC REHAB R&B

## 2024-03-01 PROCEDURE — 92507 TX SP LANG VOICE COMM INDIV: CPT

## 2024-03-01 PROCEDURE — 92508 TX SP LANG VOICE COMM GROUP: CPT

## 2024-03-01 PROCEDURE — 51701 INSERT BLADDER CATHETER: CPT

## 2024-03-01 PROCEDURE — 97130 THER IVNTJ EA ADDL 15 MIN: CPT

## 2024-03-01 PROCEDURE — 6360000002 HC RX W HCPCS: Performed by: STUDENT IN AN ORGANIZED HEALTH CARE EDUCATION/TRAINING PROGRAM

## 2024-03-01 PROCEDURE — 94762 N-INVAS EAR/PLS OXIMTRY CONT: CPT

## 2024-03-01 PROCEDURE — 97112 NEUROMUSCULAR REEDUCATION: CPT

## 2024-03-01 PROCEDURE — 97129 THER IVNTJ 1ST 15 MIN: CPT

## 2024-03-01 PROCEDURE — 92526 ORAL FUNCTION THERAPY: CPT

## 2024-03-01 RX ORDER — INSULIN GLARGINE 100 [IU]/ML
38 INJECTION, SOLUTION SUBCUTANEOUS
Status: DISCONTINUED | OUTPATIENT
Start: 2024-03-02 | End: 2024-03-08 | Stop reason: HOSPADM

## 2024-03-01 RX ORDER — BETHANECHOL CHLORIDE 25 MG/1
50 TABLET ORAL 3 TIMES DAILY
Status: DISCONTINUED | OUTPATIENT
Start: 2024-03-01 | End: 2024-03-08 | Stop reason: HOSPADM

## 2024-03-01 RX ADMIN — CETIRIZINE HYDROCHLORIDE 10 MG: 10 TABLET, FILM COATED ORAL at 08:53

## 2024-03-01 RX ADMIN — Medication 2 CAPSULE: at 08:54

## 2024-03-01 RX ADMIN — BETHANECHOL CHLORIDE 50 MG: 25 TABLET ORAL at 14:48

## 2024-03-01 RX ADMIN — SACUBITRIL AND VALSARTAN 1 TABLET: 24; 26 TABLET, FILM COATED ORAL at 21:25

## 2024-03-01 RX ADMIN — FUROSEMIDE 20 MG: 20 TABLET ORAL at 08:54

## 2024-03-01 RX ADMIN — INSULIN GLARGINE 36 UNITS: 100 INJECTION, SOLUTION SUBCUTANEOUS at 08:53

## 2024-03-01 RX ADMIN — INSULIN LISPRO 2 UNITS: 100 INJECTION, SOLUTION INTRAVENOUS; SUBCUTANEOUS at 12:42

## 2024-03-01 RX ADMIN — BETHANECHOL CHLORIDE 50 MG: 25 TABLET ORAL at 21:24

## 2024-03-01 RX ADMIN — LIDOCAINE HYDROCHLORIDE: 20 JELLY TOPICAL at 11:59

## 2024-03-01 RX ADMIN — ATORVASTATIN CALCIUM 80 MG: 80 TABLET, FILM COATED ORAL at 21:24

## 2024-03-01 RX ADMIN — CARVEDILOL 12.5 MG: 6.25 TABLET, FILM COATED ORAL at 08:53

## 2024-03-01 RX ADMIN — BETHANECHOL CHLORIDE 50 MG: 25 TABLET ORAL at 08:56

## 2024-03-01 RX ADMIN — CARVEDILOL 12.5 MG: 6.25 TABLET, FILM COATED ORAL at 17:14

## 2024-03-01 RX ADMIN — Medication 1 TABLET: at 08:54

## 2024-03-01 RX ADMIN — METFORMIN HYDROCHLORIDE 1000 MG: 500 TABLET ORAL at 17:14

## 2024-03-01 RX ADMIN — ENOXAPARIN SODIUM 40 MG: 100 INJECTION SUBCUTANEOUS at 08:54

## 2024-03-01 RX ADMIN — NYSTATIN OINTMENT: 100000 OINTMENT TOPICAL at 23:08

## 2024-03-01 RX ADMIN — NYSTATIN OINTMENT: 100000 OINTMENT TOPICAL at 08:56

## 2024-03-01 RX ADMIN — CLOPIDOGREL 75 MG: 75 TABLET, FILM COATED ORAL at 08:54

## 2024-03-01 RX ADMIN — SACUBITRIL AND VALSARTAN 1 TABLET: 24; 26 TABLET, FILM COATED ORAL at 08:53

## 2024-03-01 RX ADMIN — CHOLECALCIFEROL TAB 25 MCG (1000 UNIT) 1000 UNITS: 25 TAB at 08:54

## 2024-03-01 RX ADMIN — TAMSULOSIN HYDROCHLORIDE 0.4 MG: 0.4 CAPSULE ORAL at 21:23

## 2024-03-01 RX ADMIN — ASPIRIN 81 MG: 81 TABLET, COATED ORAL at 08:54

## 2024-03-01 RX ADMIN — DULOXETINE HYDROCHLORIDE 30 MG: 30 CAPSULE, DELAYED RELEASE ORAL at 08:54

## 2024-03-01 RX ADMIN — SPIRONOLACTONE 12.5 MG: 25 TABLET ORAL at 08:54

## 2024-03-01 RX ADMIN — METFORMIN HYDROCHLORIDE 1000 MG: 500 TABLET ORAL at 08:54

## 2024-03-01 ASSESSMENT — PAIN SCALES - GENERAL
PAINLEVEL_OUTOF10: 0
PAINLEVEL_OUTOF10: 6

## 2024-03-01 ASSESSMENT — PAIN DESCRIPTION - PAIN TYPE: TYPE: ACUTE PAIN

## 2024-03-01 ASSESSMENT — PAIN DESCRIPTION - ORIENTATION: ORIENTATION: MID

## 2024-03-01 ASSESSMENT — PAIN DESCRIPTION - FREQUENCY: FREQUENCY: INTERMITTENT

## 2024-03-01 ASSESSMENT — PAIN DESCRIPTION - LOCATION: LOCATION: COCCYX

## 2024-03-01 ASSESSMENT — PAIN DESCRIPTION - ONSET: ONSET: ON-GOING

## 2024-03-01 ASSESSMENT — PAIN DESCRIPTION - DESCRIPTORS: DESCRIPTORS: ACHING

## 2024-03-01 NOTE — PROGRESS NOTES
through the nose and exhale through her mouth. Coordination of breathing increased compared to last attempt. Benefiting from verbal and visual cues for increased coordination.  -pt unable to sustain longer than a 1-3 second average phonation time. Given max verbal cues to breathe in through her nose and phonate on the exhale. Pt difficulty coordinating each step. Each attempt with a strained voice quality and low vocal intensity.    Pitch variation trials   -Pt attempted to sing \"Happy Birthday\" to SLP. Increased pitch variation when cued to match SLP's pitch.    Cognition: Severity: Moderate   Fx Recall / Insight  - accurately oriented to day of week and date   - improving recall and orientation to deficits s/p CVA (specifically related to her pitch, visual impairment, attention and memory)     Fx Left-Visual Attention Tasks  - pt given a nutritional label (left side of page) and comprehension questions based on the information provided. Pt needed moderate verbal (look left/keeping scanning) and visual (yellow piece of paper on left side, highlights, bold underlines) cues for completion of the task. Once pt found information, she completed the questions with 100% accuracy given min verbal cues.   -Functional phone use during session. Pt found calculator and input information from the math problems to complete the calculations with mod verbal and visual cues. Pt unaware of errors made when typing due to decreased scanning/visual attention on left side. Phone print is small, pt may benefit from finding accessibility features on phone to bold text/make visuals (text) audible.    Additional Interventions:       Education  Barriers To Learning: cognition and visual  Patient Education: Provided education regarding role of SLP, results of assessment, recommendations and general speech pathology plan of care.   Learning Assessment: Pt verbalized understanding   Pt requires ongoing learning     Assessment  Impairments  Kramer Water Protocol, Oral Care, Diet Tolerance Monitoring , Patient/Family Education , Therapeutic Trials with SLP , Instrumental assessment of swallow function (Modified Barium Swallow Study)  Speech / Motor Planning / Voice intervention , Cognitive-Linguistic intervention , and Patient/ Family education   Discharge Recommendations: Home with assistance and 24 hour supervision  Continued SLP at Discharge: Yes     Goals  Patient Goals: \"everything\"   Time Frame: 3 weeks    Pt will tolerate recommended diet and advanced trials with no overt s/s of aspiration. Progressing, continue (recently advanced to a soft and bite sized diet 2/28)  Patient will demonstrate comprehension and appropriate implementation of Kramer water protocol. GOAL MET; upgraded to thin liquids  Pt will improve oral motor strength and ROM for coordinated and alternating motions, via graded tasks to improve speech back to baseline level as subjectively rated by SLP/pt and family. Progressing, continue   Patient will complete functional problem-solving tasks for daily situations with 85% accuracy or given min cues.   Ongoing  Pt will improve attention to detail for graded complex information to 80%, for improved recall and retention of newly learned information. Progressing, continue   Pt will complete word associative tasks to improve mental flexibility, complex thinking, and working memory skills with 80% accuracy.  Ongoing  Pt will improve functional visual scanning for reading comprehension and functional visual task completion with incorporation of compensatory strategies, to mild cues. Progressing, continue       Therapy Session Time      Session 1 Session 2   Time In 0800 1035   Time Out 0845 1105   Time Code Minutes 12 20   Individual Minutes 45 30     Timed Code Treatment Minutes: 32  Total Treatment Minutes:  75    Electronically Signed By:   SONYA Cox  Speech-Language Pathology Graduate Clinician    The speech-language

## 2024-03-01 NOTE — PROGRESS NOTES
effusion.  No intra-articular bodies.  Although this is a CT scan the rotator cuff does appear to be intact.  There is underlying chondrocalcinosis. Soft Tissue: No significant soft tissue edema or fluid collections.     Moderate glenohumeral and acromioclavicular joint osteoarthritis. Underlying chondrocalcinosis suggesting pyrophosphate deposition disease.     XR ABDOMEN (KUB) (SINGLE AP VIEW)    Result Date: 2/13/2024  EXAMINATION: ONE SUPINE XRAY VIEW OF THE ABDOMEN 2/13/2024 11:49 am COMPARISON: CT abdomen and pelvis 08/30/2007 HISTORY: ORDERING SYSTEM PROVIDED HISTORY: Constipation FINDINGS: 3 images are presented.  Unremarkable bowel gas pattern.  No unusual abdominal or pelvic soft tissue or calcific density is seen.  Visualized osseous structures appear unremarkable.     1. Unremarkable fecal burden. 2. Unremarkable bowel gas pattern. 3. No radiopaque urinary collecting system calculus evident.     XR ELBOW LEFT (MIN 3 VIEWS)    Result Date: 2/13/2024  EXAMINATION: THREE XRAY VIEWS OF THE LEFT ELBOW 2/13/2024 11:49 am COMPARISON: None. HISTORY: Fall/bruising FINDINGS: Osseous structures of the elbow are intact and align normally.  Joint spaces are well maintained.  No retained radiopaque foreign body is evident.  No anterior or posterior fat pad displacement is demonstrated to suggest joint effusion.  There appears to be laceration/contusion dorsal aspect of the elbow joint partially obscured by overlying dressing.     No acute osseous abnormality.     Fluoroscopy modified barium swallow with video    Result Date: 2/13/2024  EXAMINATION: MODIFIED BARIUM SWALLOW WAS PERFORMED IN CONJUNCTION WITH SPEECH PATHOLOGY SERVICES TECHNIQUE: Under fluoroscopic evaluation cineradiography/videoradiography recordings were performed in conjunction with the speech-language pathologist (SLP). Various liquid, solid and/or semi-solid barium preparations were used to assess swallowing function. FLUOROSCOPY DOSE AND TYPE:  signed by: Patrick Perrin PA-C, 3/1/2024 8:20 AM  [facility]  Office Contact: [phone]

## 2024-03-01 NOTE — PROGRESS NOTES
[facility] - Inpatient Rehabilitation Department   Phone: [phone]    Physical Therapy    [] Initial Evaluation            [x] Daily Treatment Note         [] Discharge Summary      Patient: Rissa Becerra   : 1950   MRN: [id number]   Date of Service:  3/1/2024  Admitting Diagnosis: Acute cerebrovascular accident (CVA) due to ischemia (HCC)  Current Admission Summary: Rissa Becerra is a 73 y.o. female with PMHx notable for HTN, HLD, type 2 diabetes mellitus, CHF, recent R MCA stroke (seen at OSH, started on DAPT and discharged home), who presented on 24 with worsening facial droop, left visual field loss, left sided weakness. She was ambulating with device, with minimal left sided weakness at time of prior hospital discharge, gradually developed worsening weakness resulting in several falls, including one causing L ankle fracture. She returned to [facility] ED. CT Head showed R frontal hypoattenuation. CTA Head/Neck showed L PCA occlusion, R M2 and M3 segmental narrowing. MRI Brain showed R MCA territory infarct. Neurology was consulted, recommending DAPT, statin, cardiac event monitor. Orthopedics Concomitant injuries included: L ankle fracture, for which Ortho was consulted, recommending NWB LLE and non-op management in the context of recent stroke. Hospital course complicated by: aspiration pneumonia.   Past Medical History:  has a past medical history of Arthropathy, Asthma, Bell's palsy, Bronchitis, Diabetes mellitus (HCC), Diverticulitis, Hyperlipidemia, Hypertension, Migraine, Polyneuropathy in diabetes ([facility]), and Vertigo.  Past Surgical History:  has a past surgical history that includes hernia repair; Carpal tunnel release; Total knee arthroplasty; Rotator cuff repair; and Percutaneous Transluminal Coronary Angio (2023).  Discharge Recommendations: To be determined with progress   DME Required For Discharge: DME to be determined pending patient

## 2024-03-01 NOTE — PROGRESS NOTES
03/01/24 0430   Oxygen Therapy/Pulse Ox   O2 Device None (Room air)   $Pulse Oximeter $Overnight     Sleep Study Completed on RA.  Report print out and placed in patient's chart.

## 2024-03-01 NOTE — PLAN OF CARE
Problem: Discharge Planning  Goal: Discharge to home or other facility with appropriate resources  Outcome: Progressing  Flowsheets (Taken 3/1/2024 1317)  Discharge to home or other facility with appropriate resources:   Identify barriers to discharge with patient and caregiver   Identify discharge learning needs (meds, wound care, etc)   Refer to discharge planning if patient needs post-hospital services based on physician order or complex needs related to functional status, cognitive ability or social support system   Arrange for needed discharge resources and transportation as appropriate     Problem: Safety - Adult  Goal: Free from fall injury  Outcome: Progressing  Flowsheets (Taken 3/1/2024 1317)  Free From Fall Injury: Instruct family/caregiver on patient safety     Problem: Skin/Tissue Integrity  Goal: Absence of new skin breakdown  Description: 1.  Monitor for areas of redness and/or skin breakdown  2.  Assess vascular access sites hourly  3.  Every 4-6 hours minimum:  Change oxygen saturation probe site  4.  Every 4-6 hours:  If on nasal continuous positive airway pressure, respiratory therapy assess nares and determine need for appliance change or resting period.  Outcome: Progressing     Problem: ABCDS Injury Assessment  Goal: Absence of physical injury  Outcome: Progressing  Flowsheets (Taken 3/1/2024 1317)  Absence of Physical Injury: Implement safety measures based on patient assessment     Problem: Pain  Goal: Verbalizes/displays adequate comfort level or baseline comfort level  Outcome: Progressing  Flowsheets (Taken 3/1/2024 1317)  Verbalizes/displays adequate comfort level or baseline comfort level:   Encourage patient to monitor pain and request assistance   Administer analgesics based on type and severity of pain and evaluate response   Consider cultural and social influences on pain and pain management   Assess pain using appropriate pain scale     Problem: Nutrition Deficit:  Goal: Optimize

## 2024-03-01 NOTE — PROGRESS NOTES
Acute Rehab Unit  Stroke Education      Patient participated in stroke education focused on topics of:  Stroke overview   Signs and symptoms of stroke   Stroke risk factors   Rehabilitation team   Caregiver information   Diet and exercise   Home modification   Post Stroke Depression Management   Bowel and Bladder Management   Driving/Travel   Sexuality   Resources   Medication Log   Weekly Schedule Planning Sheet   Monthly Planning Sheet      Patient:    [x] was socially appropriate and engaged in conversation    [] requires reinforcement of topics covered    [x] caregiver was present, appropriate and engaged in coversation                Name/relationship: Daughter in law, April      Materials Provided:  - [facility] Acute Rehabilitation Unit Guide to Stroke Recovery     Time:  Time In: 1300  Time Out: 1330  Total Minutes: 30  [] Individual Session   [x] Group Session     Signature:   Glenny Canseco M.A. [facility]-SLP S.P. [id number]  Speech-Language Pathologist   3/1/2024 1:41 PM

## 2024-03-01 NOTE — PROGRESS NOTES
Rissa Becerra  3/1/2024  [id number]    Chief Complaint: Acute cerebrovascular accident (CVA) due to ischemia (HCC)    Subjective:   Patient reports that she is doing well today.  Had a headache last evening, not completely resolved by as needed Tylenol.  Nursing placed a cool washcloth on her head which was helpful.  Discussed results of overnight pulse oximetry with patient, findings concerning for obstructive sleep apnea.  She is agreeable to a trial of CPAP this evening.  Remains unable to void as of yet.  Topical lidocaine is helping with her tolerance of straight caths.    Last BM: Stool Occurrence: 1 (03/01/24 1145)  PRNs administered past 24h: Tylenol, Urojet    Objective:  Patient Vitals for the past 24 hrs:   BP Temp Temp src Pulse Resp SpO2 Weight   03/01/24 0849 117/72 97.6 °F (36.4 °C) Oral 69 18 94 % --   03/01/24 0517 -- -- -- -- -- -- 87 kg (191 lb 12.8 oz)   02/29/24 2015 108/65 98.1 °F (36.7 °C) Oral 73 18 98 % --   02/29/24 1700 116/77 -- -- 79 -- -- --     Gen: No distress, pleasant.   HEENT: Normocephalic, atraumatic.   CV: Regular rate and rhythm. Extremities warm, well perfused.   Resp: No respiratory distress. CTAB.  Abd: Soft, nontender.  Ext: Left lower leg in splint with ACE wrap.  Neuro: Alert, oriented, appropriately interactive. Left upper extremity 0/5. Left lower extremity 1/5 in hip extension, hip adduction. MAS 1 to 1+ LUE elbow flexors, wrist flexors.     Laboratory data: Available via EMR.     Therapy progress:       PT    Supine to Sit: Dependent  Sit to Supine: Dependent   Sit to Stand:    Chair/Bed to Chair Transfer: Dependent  Car Transfer:    Ambulation 10 ft:    Ambulation 50 ft:    Ambulation 150 ft:    Stairs - 1 Step:    Stairs - 4 Step:    Stairs - 12 Step:      OT    Eating: Substantial/maximal assistance  Oral Hygiene: Dependent  Bathing: Dependent  Upper Body Dressing: Substantial/maximal assistance  Lower Body Dressing: Dependent  Toilet Transfer: Dependent  Toilet  Cardiology, PCP  ELOS: 22 days    Ottoniel Greene MD 3/1/2024, 12:54 PM    * This document was created using dictation software.  While all precautions were taken to ensure accuracy, errors may have occurred.  Please disregard any typographical errors.

## 2024-03-01 NOTE — PROGRESS NOTES
[facility] - Inpatient Rehabilitation Department   Phone: [phone]    Occupational Therapy    [] Initial Evaluation            [x] Daily Treatment Note         [] Discharge Summary      Patient: Rissa Becerra   : 1950   MRN: [id number]   Date of Service:  3/1/2024    Admitting Diagnosis:  Acute cerebrovascular accident (CVA) due to ischemia (HCC)  Current Admission Summary: Rissa Becerra is a 73 y.o. female with PMHx notable for HTN, HLD, type 2 diabetes mellitus, CHF, recent R MCA stroke (seen at OSH, started on DAPT and discharged home), who presented on 24 with worsening facial droop, left visual field loss, left sided weakness. She was ambulating with device, with minimal left sided weakness at time of prior hospital discharge, gradually developed worsening weakness resulting in several falls, including one causing L ankle fracture. She returned to [facility] ED. CT Head showed R frontal hypoattenuation. CTA Head/Neck showed L PCA occlusion, R M2 and M3 segmental narrowing. MRI Brain showed R MCA territory infarct. Echo with LVEF 55-60%, negative bubble study. Neurology was consulted, recommending DAPT, statin, cardiac event monitor. Orthopedics Concomitant injuries included: L ankle fracture, for which Ortho was consulted, recommending NWB LLE and non-op management in the context of recent stroke. Hospital course complicated by: aspiration pneumonia, L shoulder pain (CT L shoulder negative for fracture), urinary retention.      Patient reports that she is doing well today. She denies any fevers, chills, chest pain, shortness of breath. Endorses stable L visual field deficits, dysarthria, dysphagia, left sided weakness. Endorses left ankle pain. Endorses urinary retention, requiring straight cath since Kilgore catheter discontinued.      Past Medical History:  has a past medical history of Arthropathy, Asthma, Bell's palsy, Bronchitis, Diabetes mellitus (HCC), Diverticulitis,  11:47 AM

## 2024-03-02 LAB
GLUCOSE BLD-MCNC: 142 MG/DL (ref 70–99)
GLUCOSE BLD-MCNC: 144 MG/DL (ref 70–99)
GLUCOSE BLD-MCNC: 175 MG/DL (ref 70–99)
GLUCOSE BLD-MCNC: 197 MG/DL (ref 70–99)
PERFORMED ON: ABNORMAL

## 2024-03-02 PROCEDURE — 94660 CPAP INITIATION&MGMT: CPT

## 2024-03-02 PROCEDURE — 1280000000 HC REHAB R&B

## 2024-03-02 PROCEDURE — 6370000000 HC RX 637 (ALT 250 FOR IP): Performed by: STUDENT IN AN ORGANIZED HEALTH CARE EDUCATION/TRAINING PROGRAM

## 2024-03-02 PROCEDURE — 2700000000 HC OXYGEN THERAPY PER DAY

## 2024-03-02 PROCEDURE — 6370000000 HC RX 637 (ALT 250 FOR IP)

## 2024-03-02 PROCEDURE — 94761 N-INVAS EAR/PLS OXIMETRY MLT: CPT

## 2024-03-02 PROCEDURE — 51798 US URINE CAPACITY MEASURE: CPT

## 2024-03-02 PROCEDURE — 6360000002 HC RX W HCPCS: Performed by: STUDENT IN AN ORGANIZED HEALTH CARE EDUCATION/TRAINING PROGRAM

## 2024-03-02 RX ADMIN — ENOXAPARIN SODIUM 40 MG: 100 INJECTION SUBCUTANEOUS at 09:08

## 2024-03-02 RX ADMIN — SACUBITRIL AND VALSARTAN 1 TABLET: 24; 26 TABLET, FILM COATED ORAL at 09:09

## 2024-03-02 RX ADMIN — METFORMIN HYDROCHLORIDE 1000 MG: 500 TABLET ORAL at 09:09

## 2024-03-02 RX ADMIN — SPIRONOLACTONE 12.5 MG: 25 TABLET ORAL at 09:09

## 2024-03-02 RX ADMIN — FAMOTIDINE 20 MG: 20 TABLET, FILM COATED ORAL at 14:00

## 2024-03-02 RX ADMIN — TAMSULOSIN HYDROCHLORIDE 0.4 MG: 0.4 CAPSULE ORAL at 22:24

## 2024-03-02 RX ADMIN — DULOXETINE HYDROCHLORIDE 30 MG: 30 CAPSULE, DELAYED RELEASE ORAL at 09:09

## 2024-03-02 RX ADMIN — BETHANECHOL CHLORIDE 50 MG: 25 TABLET ORAL at 22:18

## 2024-03-02 RX ADMIN — CLOPIDOGREL 75 MG: 75 TABLET, FILM COATED ORAL at 09:09

## 2024-03-02 RX ADMIN — SACUBITRIL AND VALSARTAN 1 TABLET: 24; 26 TABLET, FILM COATED ORAL at 22:24

## 2024-03-02 RX ADMIN — CETIRIZINE HYDROCHLORIDE 10 MG: 10 TABLET, FILM COATED ORAL at 09:09

## 2024-03-02 RX ADMIN — CARVEDILOL 12.5 MG: 6.25 TABLET, FILM COATED ORAL at 09:08

## 2024-03-02 RX ADMIN — NYSTATIN OINTMENT: 100000 OINTMENT TOPICAL at 22:17

## 2024-03-02 RX ADMIN — BETHANECHOL CHLORIDE 50 MG: 25 TABLET ORAL at 13:59

## 2024-03-02 RX ADMIN — ATORVASTATIN CALCIUM 80 MG: 80 TABLET, FILM COATED ORAL at 22:18

## 2024-03-02 RX ADMIN — ASPIRIN 81 MG: 81 TABLET, COATED ORAL at 09:09

## 2024-03-02 RX ADMIN — NYSTATIN OINTMENT: 100000 OINTMENT TOPICAL at 09:08

## 2024-03-02 RX ADMIN — Medication 2 CAPSULE: at 09:09

## 2024-03-02 RX ADMIN — CHOLECALCIFEROL TAB 25 MCG (1000 UNIT) 1000 UNITS: 25 TAB at 09:09

## 2024-03-02 RX ADMIN — METFORMIN HYDROCHLORIDE 1000 MG: 500 TABLET ORAL at 17:10

## 2024-03-02 RX ADMIN — FUROSEMIDE 20 MG: 20 TABLET ORAL at 09:09

## 2024-03-02 RX ADMIN — INSULIN GLARGINE 38 UNITS: 100 INJECTION, SOLUTION SUBCUTANEOUS at 09:10

## 2024-03-02 RX ADMIN — CARVEDILOL 12.5 MG: 6.25 TABLET, FILM COATED ORAL at 17:08

## 2024-03-02 RX ADMIN — Medication 1 TABLET: at 09:09

## 2024-03-02 RX ADMIN — BETHANECHOL CHLORIDE 50 MG: 25 TABLET ORAL at 09:09

## 2024-03-02 ASSESSMENT — PAIN SCALES - GENERAL: PAINLEVEL_OUTOF10: 0

## 2024-03-02 NOTE — PLAN OF CARE
Problem: Safety - Adult  Goal: Free from fall injury  Outcome: Progressing  Note: Pt remains free from falls.  Safety precautions in place.  Bed in lowest position, bed/chair wheels locked, call light with in reach, bedside table in reach, bed/chair alarm on, fall risk wrist band on.

## 2024-03-02 NOTE — PROGRESS NOTES
Urology Progress Note  [facility]     Patient: Rissa Becerra MRN: [id number]  Room/Bed: [facility]-4905/4905-01   YOB: 1950  Age/Sex: 73 y.o.female  Admission Date: 2/16/2024     Date of Service:  3/2/2024    ASSESSMENT/PLAN     74 yo female with acute R MCA, currently in rehab. Urology consulted for urinary retention. Has failed one other voiding trial inpatient. Has had the rubio since 02/16/2024.     Recommendations:  Rubio removed 2/28. Feels like she is voiding okay but pvrs remain elevated last one 112  Continue flomax, add urecholine   Bladder scan TID and str cath residuals > 450 ccs. She does well with topical lidocaine.   Will follow call with questions     All patient questions were answered. She understands the plan as listed above.    SUBJECTIVE     Chief Complaint: Follow-up urinary retention    24 Hour Events: Today patient reports doing okay no  pain.  Otherwise symptoms are overall improved and pain is adequately controlled.  Denies nausea/vomiting.  No other genitourinary symptoms.    OBJECTIVE     Hospital Problem List:  Principal Problem:    Acute cerebrovascular accident (CVA) due to ischemia (HCC)  Resolved Problems:    * No resolved hospital problems. *      Physical Exam:  Vitals:    03/02/24 0040   BP:    Pulse:    Resp:    Temp:    SpO2: 97%     CONSTITUTIONAL: The patient is well nourished/developed, with no distress noted.   NEUROLOGICAL/PSYCHIATRIC: Oriented to place and time, normal affected noted.   CARDIOVASCULAR: Regular rate and rhythm, no evidence of swelling noted.   RESPIRATORY: Normal respiratory effort with no wheezing noted.   ABDOMEN: Abdomen soft, non-tender, non-distended. No enlarged liver or spleen. No hernias noted.   GENITOURINARY: Deferred    Ins/Outs:    Intake/Output Summary (Last 24 hours) at 3/2/2024 0807  Last data filed at 3/1/2024 1230  Gross per 24 hour   Intake 840 ml   Output 550 ml   Net 290 ml         Labs:  CBC   Lab Results  3/2/2024 8:07 AM  [facility]  Office Contact: [phone]

## 2024-03-02 NOTE — PROGRESS NOTES
Alert, oriented x4. Patient reports urinary incontinence. Large amount of urine noted in diaper. Post-void bladder scan revealed 263 mL in bladder. Cleaned, changed, repositioned. The care plan and education has been reviewed and mutually agreed upon with the patient. In bed, alarm on, bed in lowest position, call light and table within reach. No further needs expressed at this time.    0035  Patient felt like she might have had urge to void. Placed on bedpan. Unable to void. Bladder scanned for 325 mL. Purewick initiated per patient request.

## 2024-03-03 VITALS
WEIGHT: 193.1 LBS | BODY MASS INDEX: 35.53 KG/M2 | HEIGHT: 62 IN | RESPIRATION RATE: 16 BRPM | SYSTOLIC BLOOD PRESSURE: 102 MMHG | HEART RATE: 64 BPM | OXYGEN SATURATION: 93 % | DIASTOLIC BLOOD PRESSURE: 65 MMHG | TEMPERATURE: 98.1 F

## 2024-03-03 LAB
GLUCOSE BLD-MCNC: 127 MG/DL (ref 70–99)
GLUCOSE BLD-MCNC: 140 MG/DL (ref 70–99)
GLUCOSE BLD-MCNC: 163 MG/DL (ref 70–99)
GLUCOSE BLD-MCNC: 174 MG/DL (ref 70–99)
PERFORMED ON: ABNORMAL

## 2024-03-03 PROCEDURE — 6370000000 HC RX 637 (ALT 250 FOR IP)

## 2024-03-03 PROCEDURE — 6360000002 HC RX W HCPCS: Performed by: STUDENT IN AN ORGANIZED HEALTH CARE EDUCATION/TRAINING PROGRAM

## 2024-03-03 PROCEDURE — 1280000000 HC REHAB R&B

## 2024-03-03 PROCEDURE — 6370000000 HC RX 637 (ALT 250 FOR IP): Performed by: STUDENT IN AN ORGANIZED HEALTH CARE EDUCATION/TRAINING PROGRAM

## 2024-03-03 PROCEDURE — 51798 US URINE CAPACITY MEASURE: CPT

## 2024-03-03 RX ADMIN — NYSTATIN OINTMENT: 100000 OINTMENT TOPICAL at 09:38

## 2024-03-03 RX ADMIN — BETHANECHOL CHLORIDE 50 MG: 25 TABLET ORAL at 20:08

## 2024-03-03 RX ADMIN — METFORMIN HYDROCHLORIDE 1000 MG: 500 TABLET ORAL at 09:39

## 2024-03-03 RX ADMIN — ENOXAPARIN SODIUM 40 MG: 100 INJECTION SUBCUTANEOUS at 09:38

## 2024-03-03 RX ADMIN — INSULIN GLARGINE 38 UNITS: 100 INJECTION, SOLUTION SUBCUTANEOUS at 09:39

## 2024-03-03 RX ADMIN — NYSTATIN OINTMENT: 100000 OINTMENT TOPICAL at 20:09

## 2024-03-03 RX ADMIN — CLOPIDOGREL 75 MG: 75 TABLET, FILM COATED ORAL at 09:39

## 2024-03-03 RX ADMIN — SACUBITRIL AND VALSARTAN 1 TABLET: 24; 26 TABLET, FILM COATED ORAL at 09:38

## 2024-03-03 RX ADMIN — CARVEDILOL 12.5 MG: 6.25 TABLET, FILM COATED ORAL at 17:59

## 2024-03-03 RX ADMIN — SPIRONOLACTONE 12.5 MG: 25 TABLET ORAL at 09:38

## 2024-03-03 RX ADMIN — CARVEDILOL 12.5 MG: 6.25 TABLET, FILM COATED ORAL at 09:38

## 2024-03-03 RX ADMIN — ATORVASTATIN CALCIUM 80 MG: 80 TABLET, FILM COATED ORAL at 20:08

## 2024-03-03 RX ADMIN — SIMETHICONE 80 MG: 80 TABLET, CHEWABLE ORAL at 10:51

## 2024-03-03 RX ADMIN — CHOLECALCIFEROL TAB 25 MCG (1000 UNIT) 1000 UNITS: 25 TAB at 09:39

## 2024-03-03 RX ADMIN — BETHANECHOL CHLORIDE 50 MG: 25 TABLET ORAL at 09:39

## 2024-03-03 RX ADMIN — ACETAMINOPHEN 650 MG: 325 TABLET ORAL at 17:59

## 2024-03-03 RX ADMIN — BETHANECHOL CHLORIDE 50 MG: 25 TABLET ORAL at 13:47

## 2024-03-03 RX ADMIN — DULOXETINE HYDROCHLORIDE 30 MG: 30 CAPSULE, DELAYED RELEASE ORAL at 09:39

## 2024-03-03 RX ADMIN — METFORMIN HYDROCHLORIDE 1000 MG: 500 TABLET ORAL at 17:59

## 2024-03-03 RX ADMIN — ASPIRIN 81 MG: 81 TABLET, COATED ORAL at 09:39

## 2024-03-03 RX ADMIN — CETIRIZINE HYDROCHLORIDE 10 MG: 10 TABLET, FILM COATED ORAL at 09:39

## 2024-03-03 RX ADMIN — FUROSEMIDE 20 MG: 20 TABLET ORAL at 09:39

## 2024-03-03 RX ADMIN — TAMSULOSIN HYDROCHLORIDE 0.4 MG: 0.4 CAPSULE ORAL at 20:08

## 2024-03-03 RX ADMIN — Medication 1 TABLET: at 09:38

## 2024-03-03 RX ADMIN — Medication 2 CAPSULE: at 09:39

## 2024-03-03 RX ADMIN — SACUBITRIL AND VALSARTAN 1 TABLET: 24; 26 TABLET, FILM COATED ORAL at 20:08

## 2024-03-03 ASSESSMENT — PAIN DESCRIPTION - LOCATION: LOCATION: HEAD

## 2024-03-03 ASSESSMENT — PAIN SCALES - GENERAL
PAINLEVEL_OUTOF10: 0
PAINLEVEL_OUTOF10: 4

## 2024-03-03 ASSESSMENT — PAIN - FUNCTIONAL ASSESSMENT: PAIN_FUNCTIONAL_ASSESSMENT: ACTIVITIES ARE NOT PREVENTED

## 2024-03-03 ASSESSMENT — PAIN DESCRIPTION - ORIENTATION: ORIENTATION: MID

## 2024-03-03 ASSESSMENT — PAIN DESCRIPTION - DESCRIPTORS: DESCRIPTORS: ACHING

## 2024-03-03 NOTE — PROGRESS NOTES
intra-articular bodies.  Although this is a CT scan the rotator cuff does appear to be intact.  There is underlying chondrocalcinosis. Soft Tissue: No significant soft tissue edema or fluid collections.     Moderate glenohumeral and acromioclavicular joint osteoarthritis. Underlying chondrocalcinosis suggesting pyrophosphate deposition disease.     XR ABDOMEN (KUB) (SINGLE AP VIEW)    Result Date: 2/13/2024  EXAMINATION: ONE SUPINE XRAY VIEW OF THE ABDOMEN 2/13/2024 11:49 am COMPARISON: CT abdomen and pelvis 08/30/2007 HISTORY: ORDERING SYSTEM PROVIDED HISTORY: Constipation FINDINGS: 3 images are presented.  Unremarkable bowel gas pattern.  No unusual abdominal or pelvic soft tissue or calcific density is seen.  Visualized osseous structures appear unremarkable.     1. Unremarkable fecal burden. 2. Unremarkable bowel gas pattern. 3. No radiopaque urinary collecting system calculus evident.     XR ELBOW LEFT (MIN 3 VIEWS)    Result Date: 2/13/2024  EXAMINATION: THREE XRAY VIEWS OF THE LEFT ELBOW 2/13/2024 11:49 am COMPARISON: None. HISTORY: Fall/bruising FINDINGS: Osseous structures of the elbow are intact and align normally.  Joint spaces are well maintained.  No retained radiopaque foreign body is evident.  No anterior or posterior fat pad displacement is demonstrated to suggest joint effusion.  There appears to be laceration/contusion dorsal aspect of the elbow joint partially obscured by overlying dressing.     No acute osseous abnormality.     Fluoroscopy modified barium swallow with video    Result Date: 2/13/2024  EXAMINATION: MODIFIED BARIUM SWALLOW WAS PERFORMED IN CONJUNCTION WITH SPEECH PATHOLOGY SERVICES TECHNIQUE: Under fluoroscopic evaluation cineradiography/videoradiography recordings were performed in conjunction with the speech-language pathologist (SLP). Various liquid, solid and/or semi-solid barium preparations were used to assess swallowing function. FLUOROSCOPY DOSE AND TYPE: Radiation Exposure

## 2024-03-03 NOTE — PROGRESS NOTES
improved labial coordination/ breath support improving her overall intelligibility within structured conversation. Pt's goals are to continue to work on pitch variation for singing. Pt improving with oral endurance and oral prep phase for tolerating advanced solids with ongoing encouragement and assistance with eating. Recently upgraded to a soft and bite sized diet. Continue to provide pt with more advanced solid trials with the goal for pt to return to a least restrictive diet. Continue with speech therapy for language, cognitive linguistic, and dysphagia therapy to address deficits and to enhance return to prior level of function.    Body mass index is 35.15 kg/m².    Assessment:    This patient continues to require an ARU level of care from all disciplines to address the following issues:    Patient Active Problem List   Diagnosis    Localized edema    Diabetic polyneuropathy (HCC)    Acute CVA (cerebrovascular accident) ([facility])    Left hemiplegia (HCC)    Dysphagia    CAD (coronary artery disease)    DM2 (diabetes mellitus, type 2) (HCC)    HTN (hypertension), benign    Obesity (BMI 30-39.9)    Acute left-sided weakness    Arterial ischemic stroke, ICA, right, acute (HCC)    DM (diabetes mellitus), secondary, with complications ([facility])    Dyslipidemia    Closed displaced fracture of lateral malleolus of left fibula    Acute cerebrovascular accident (CVA) due to ischemia (HCC)       Functional progress: Improving initiation of scooting during slide board transfers.     Plan:  #. Acute R MCA territory ischemic stroke  - thromoembolic vs cardioembolic  - MRI Brain w/ acute R MCA infarct  - Echo with LVEF 55-60%, indeterminate diastolic dysfunction, RV dilation, negative bubble study  - ASA, clopidigrel, statin  - cardiac event monior x 30 days     #. Suspected MALINDA  - Likely contributing to headaches.   - STOP-BANG at least 5 points, high risk.  - Nocturnal pulse oximetry with average of ~25 significant desaturations

## 2024-03-03 NOTE — PROGRESS NOTES
Pt refuses to wear cpap.  She says it is suffocating.  The nurse says her oxygen drops into 80's I placed on 2l/m with a continuous pulse oximetry.

## 2024-03-03 NOTE — PLAN OF CARE
Problem: Safety - Adult  Goal: Free from fall injury  3/3/2024 1103 by Iveth Mix RN  Outcome: Progressing  Note: Pt remains free from falls.  Safety precautions in place.  Bed in lowest position, bed/chair wheels locked, call light with in reach, bedside table in reach, bed/chair alarm on, fall risk wrist band on.

## 2024-03-03 NOTE — PLAN OF CARE
Problem: Safety - Adult  Goal: Free from fall injury  Outcome: Progressing     Problem: Skin/Tissue Integrity  Goal: Absence of new skin breakdown  Description: 1.  Monitor for areas of redness and/or skin breakdown  2.  Assess vascular access sites hourly  3.  Every 4-6 hours minimum:  Change oxygen saturation probe site  4.  Every 4-6 hours:  If on nasal continuous positive airway pressure, respiratory therapy assess nares and determine need for appliance change or resting period.  Outcome: Progressing     Problem: ABCDS Injury Assessment  Goal: Absence of physical injury  Outcome: Progressing  Flowsheets (Taken 3/2/2024 2300)  Absence of Physical Injury: Implement safety measures based on patient assessment     Problem: Pain  Goal: Verbalizes/displays adequate comfort level or baseline comfort level  Outcome: Progressing  Flowsheets (Taken 3/2/2024 2213)  Verbalizes/displays adequate comfort level or baseline comfort level: Encourage patient to monitor pain and request assistance     Problem: Chronic Conditions and Co-morbidities  Goal: Patient's chronic conditions and co-morbidity symptoms are monitored and maintained or improved  Outcome: Progressing  Flowsheets (Taken 3/2/2024 2213)  Care Plan - Patient's Chronic Conditions and Co-Morbidity Symptoms are Monitored and Maintained or Improved:   Monitor and assess patient's chronic conditions and comorbid symptoms for stability, deterioration, or improvement   Collaborate with multidisciplinary team to address chronic and comorbid conditions and prevent exacerbation or deterioration   Update acute care plan with appropriate goals if chronic or comorbid symptoms are exacerbated and prevent overall improvement and discharge

## 2024-03-03 NOTE — PROGRESS NOTES
Assessment complete. Sleeping upon entry to room. Easily roused. Oriented x4. Denies pain. SpO2 ranging 88% to 95% while patient resting. Patient states that she does not want to wear the CPAP tonight as it feels like she is suffocating when she wears it. Respiratory therapist contacted. Noted to have 200 mL urine in Purewick cannister. Bladder scanned post-void for 160 mL. The care plan and education has been reviewed and mutually agreed upon with the patient. In bed, alarm on, bed in lowest position, call light and table within reach. No further needs expressed at this time.    0230  Patient has not voided since occurrence earlier this shift. Bladder scanned for 222 mL. SpO2 greater than 90% each time this RN has checked on patient since 2L per nasal cannula and continuous pulse oximetry initated by RT.    0430  Patient has not voided since occurrence earlier this shift. Bladder scanned for 310 mL. SpO2 continues to be greater than 90% each time this RN has checked on patient on 2L per nasal cannula.

## 2024-03-04 LAB
ANION GAP SERPL CALCULATED.3IONS-SCNC: 10 MMOL/L (ref 3–16)
BACTERIA URNS QL MICRO: ABNORMAL /HPF
BILIRUB UR QL STRIP.AUTO: NEGATIVE
BUN SERPL-MCNC: 30 MG/DL (ref 7–20)
CALCIUM SERPL-MCNC: 9.6 MG/DL (ref 8.3–10.6)
CHARACTER UR: ABNORMAL
CHLORIDE SERPL-SCNC: 99 MMOL/L (ref 99–110)
CLARITY UR: ABNORMAL
CO2 SERPL-SCNC: 26 MMOL/L (ref 21–32)
COLOR UR: YELLOW
CREAT SERPL-MCNC: 0.7 MG/DL (ref 0.6–1.2)
DEPRECATED RDW RBC AUTO: 14.6 % (ref 12.4–15.4)
EPI CELLS #/AREA URNS AUTO: 20 /HPF (ref 0–5)
GFR SERPLBLD CREATININE-BSD FMLA CKD-EPI: >60 ML/MIN/{1.73_M2}
GLUCOSE BLD-MCNC: 121 MG/DL (ref 70–99)
GLUCOSE BLD-MCNC: 149 MG/DL (ref 70–99)
GLUCOSE BLD-MCNC: 156 MG/DL (ref 70–99)
GLUCOSE BLD-MCNC: 158 MG/DL (ref 70–99)
GLUCOSE SERPL-MCNC: 156 MG/DL (ref 70–99)
GLUCOSE UR STRIP.AUTO-MCNC: NEGATIVE MG/DL
HCT VFR BLD AUTO: 37.8 % (ref 36–48)
HGB BLD-MCNC: 12.5 G/DL (ref 12–16)
HGB UR QL STRIP.AUTO: ABNORMAL
KETONES UR STRIP.AUTO-MCNC: NEGATIVE MG/DL
LEUKOCYTE ESTERASE UR QL STRIP.AUTO: ABNORMAL
MCH RBC QN AUTO: 30.4 PG (ref 26–34)
MCHC RBC AUTO-ENTMCNC: 33.1 G/DL (ref 31–36)
MCV RBC AUTO: 91.7 FL (ref 80–100)
NITRITE UR QL STRIP.AUTO: POSITIVE
PERFORMED ON: ABNORMAL
PH UR STRIP.AUTO: 5.5 [PH] (ref 5–8)
PLATELET # BLD AUTO: 349 K/UL (ref 135–450)
PMV BLD AUTO: 7 FL (ref 5–10.5)
POTASSIUM SERPL-SCNC: 5.1 MMOL/L (ref 3.5–5.1)
PROT UR STRIP.AUTO-MCNC: 30 MG/DL
RBC # BLD AUTO: 4.12 M/UL (ref 4–5.2)
RBC CLUMPS #/AREA URNS AUTO: 7 /HPF (ref 0–4)
SODIUM SERPL-SCNC: 135 MMOL/L (ref 136–145)
SP GR UR STRIP.AUTO: 1.01 (ref 1–1.03)
UA COMPLETE W REFLEX CULTURE PNL UR: YES
UA DIPSTICK W REFLEX MICRO PNL UR: YES
URN SPEC COLLECT METH UR: ABNORMAL
UROBILINOGEN UR STRIP-ACNC: 1 E.U./DL
WBC # BLD AUTO: 8.3 K/UL (ref 4–11)
WBC #/AREA URNS AUTO: 2832 /HPF (ref 0–5)

## 2024-03-04 PROCEDURE — 81001 URINALYSIS AUTO W/SCOPE: CPT

## 2024-03-04 PROCEDURE — 97129 THER IVNTJ 1ST 15 MIN: CPT

## 2024-03-04 PROCEDURE — 87086 URINE CULTURE/COLONY COUNT: CPT

## 2024-03-04 PROCEDURE — 87186 SC STD MICRODIL/AGAR DIL: CPT

## 2024-03-04 PROCEDURE — 85027 COMPLETE CBC AUTOMATED: CPT

## 2024-03-04 PROCEDURE — 97535 SELF CARE MNGMENT TRAINING: CPT

## 2024-03-04 PROCEDURE — 51701 INSERT BLADDER CATHETER: CPT

## 2024-03-04 PROCEDURE — 94660 CPAP INITIATION&MGMT: CPT

## 2024-03-04 PROCEDURE — 6370000000 HC RX 637 (ALT 250 FOR IP): Performed by: STUDENT IN AN ORGANIZED HEALTH CARE EDUCATION/TRAINING PROGRAM

## 2024-03-04 PROCEDURE — 97130 THER IVNTJ EA ADDL 15 MIN: CPT

## 2024-03-04 PROCEDURE — 6370000000 HC RX 637 (ALT 250 FOR IP)

## 2024-03-04 PROCEDURE — 87077 CULTURE AEROBIC IDENTIFY: CPT

## 2024-03-04 PROCEDURE — 36415 COLL VENOUS BLD VENIPUNCTURE: CPT

## 2024-03-04 PROCEDURE — 92507 TX SP LANG VOICE COMM INDIV: CPT

## 2024-03-04 PROCEDURE — 6360000002 HC RX W HCPCS: Performed by: STUDENT IN AN ORGANIZED HEALTH CARE EDUCATION/TRAINING PROGRAM

## 2024-03-04 PROCEDURE — 94760 N-INVAS EAR/PLS OXIMETRY 1: CPT

## 2024-03-04 PROCEDURE — 92526 ORAL FUNCTION THERAPY: CPT

## 2024-03-04 PROCEDURE — 1280000000 HC REHAB R&B

## 2024-03-04 PROCEDURE — 51798 US URINE CAPACITY MEASURE: CPT

## 2024-03-04 PROCEDURE — 97530 THERAPEUTIC ACTIVITIES: CPT

## 2024-03-04 PROCEDURE — 80048 BASIC METABOLIC PNL TOTAL CA: CPT

## 2024-03-04 RX ORDER — NITROFURANTOIN 25; 75 MG/1; MG/1
100 CAPSULE ORAL EVERY 12 HOURS SCHEDULED
Status: DISCONTINUED | OUTPATIENT
Start: 2024-03-04 | End: 2024-03-08 | Stop reason: HOSPADM

## 2024-03-04 RX ADMIN — CHOLECALCIFEROL TAB 25 MCG (1000 UNIT) 1000 UNITS: 25 TAB at 08:48

## 2024-03-04 RX ADMIN — SPIRONOLACTONE 12.5 MG: 25 TABLET ORAL at 08:49

## 2024-03-04 RX ADMIN — DULOXETINE HYDROCHLORIDE 30 MG: 30 CAPSULE, DELAYED RELEASE ORAL at 08:49

## 2024-03-04 RX ADMIN — SACUBITRIL AND VALSARTAN 1 TABLET: 24; 26 TABLET, FILM COATED ORAL at 21:55

## 2024-03-04 RX ADMIN — Medication 1 TABLET: at 10:33

## 2024-03-04 RX ADMIN — INSULIN GLARGINE 38 UNITS: 100 INJECTION, SOLUTION SUBCUTANEOUS at 08:58

## 2024-03-04 RX ADMIN — BETHANECHOL CHLORIDE 50 MG: 25 TABLET ORAL at 21:55

## 2024-03-04 RX ADMIN — SACUBITRIL AND VALSARTAN 1 TABLET: 24; 26 TABLET, FILM COATED ORAL at 08:49

## 2024-03-04 RX ADMIN — Medication 2 CAPSULE: at 08:49

## 2024-03-04 RX ADMIN — CARVEDILOL 12.5 MG: 6.25 TABLET, FILM COATED ORAL at 08:50

## 2024-03-04 RX ADMIN — NYSTATIN OINTMENT: 100000 OINTMENT TOPICAL at 09:01

## 2024-03-04 RX ADMIN — NYSTATIN OINTMENT: 100000 OINTMENT TOPICAL at 21:56

## 2024-03-04 RX ADMIN — CARVEDILOL 12.5 MG: 6.25 TABLET, FILM COATED ORAL at 16:35

## 2024-03-04 RX ADMIN — METFORMIN HYDROCHLORIDE 1000 MG: 500 TABLET ORAL at 16:35

## 2024-03-04 RX ADMIN — BETHANECHOL CHLORIDE 50 MG: 25 TABLET ORAL at 08:49

## 2024-03-04 RX ADMIN — ASPIRIN 81 MG: 81 TABLET, COATED ORAL at 08:48

## 2024-03-04 RX ADMIN — NITROFURANTOIN MONOHYDRATE/MACROCRYSTALS 100 MG: 75; 25 CAPSULE ORAL at 10:33

## 2024-03-04 RX ADMIN — CLOPIDOGREL 75 MG: 75 TABLET, FILM COATED ORAL at 08:48

## 2024-03-04 RX ADMIN — CETIRIZINE HYDROCHLORIDE 10 MG: 10 TABLET, FILM COATED ORAL at 08:48

## 2024-03-04 RX ADMIN — ENOXAPARIN SODIUM 40 MG: 100 INJECTION SUBCUTANEOUS at 08:50

## 2024-03-04 RX ADMIN — ACETAMINOPHEN 650 MG: 325 TABLET ORAL at 21:54

## 2024-03-04 RX ADMIN — FUROSEMIDE 20 MG: 20 TABLET ORAL at 08:49

## 2024-03-04 RX ADMIN — NITROFURANTOIN MONOHYDRATE/MACROCRYSTALS 100 MG: 75; 25 CAPSULE ORAL at 21:55

## 2024-03-04 RX ADMIN — BETHANECHOL CHLORIDE 50 MG: 25 TABLET ORAL at 13:49

## 2024-03-04 RX ADMIN — ATORVASTATIN CALCIUM 80 MG: 80 TABLET, FILM COATED ORAL at 21:55

## 2024-03-04 RX ADMIN — METFORMIN HYDROCHLORIDE 1000 MG: 500 TABLET ORAL at 08:48

## 2024-03-04 RX ADMIN — TAMSULOSIN HYDROCHLORIDE 0.4 MG: 0.4 CAPSULE ORAL at 21:55

## 2024-03-04 ASSESSMENT — PAIN SCALES - GENERAL
PAINLEVEL_OUTOF10: 7
PAINLEVEL_OUTOF10: 0

## 2024-03-04 ASSESSMENT — PAIN DESCRIPTION - PAIN TYPE: TYPE: ACUTE PAIN

## 2024-03-04 ASSESSMENT — PAIN - FUNCTIONAL ASSESSMENT: PAIN_FUNCTIONAL_ASSESSMENT: ACTIVITIES ARE NOT PREVENTED

## 2024-03-04 ASSESSMENT — PAIN SCALES - WONG BAKER: WONGBAKER_NUMERICALRESPONSE: 0

## 2024-03-04 ASSESSMENT — PAIN DESCRIPTION - DESCRIPTORS: DESCRIPTORS: DULL;THROBBING

## 2024-03-04 ASSESSMENT — PAIN DESCRIPTION - ORIENTATION: ORIENTATION: ANTERIOR

## 2024-03-04 ASSESSMENT — PAIN DESCRIPTION - LOCATION: LOCATION: HEAD

## 2024-03-04 ASSESSMENT — PAIN DESCRIPTION - FREQUENCY: FREQUENCY: INTERMITTENT

## 2024-03-04 NOTE — PLAN OF CARE
Problem: Discharge Planning  Goal: Discharge to home or other facility with appropriate resources  Outcome: Progressing  Flowsheets (Taken 3/4/2024 0902)  Discharge to home or other facility with appropriate resources:   Identify barriers to discharge with patient and caregiver   Identify discharge learning needs (meds, wound care, etc)     Problem: Safety - Adult  Goal: Free from fall injury  Outcome: Progressing     Problem: Skin/Tissue Integrity  Goal: Absence of new skin breakdown  Description: 1.  Monitor for areas of redness and/or skin breakdown  2.  Assess vascular access sites hourly  3.  Every 4-6 hours minimum:  Change oxygen saturation probe site  4.  Every 4-6 hours:  If on nasal continuous positive airway pressure, respiratory therapy assess nares and determine need for appliance change or resting period.  Outcome: Progressing     Problem: ABCDS Injury Assessment  Goal: Absence of physical injury  Outcome: Progressing     Problem: Pain  Goal: Verbalizes/displays adequate comfort level or baseline comfort level  Outcome: Progressing  Flowsheets (Taken 3/4/2024 0843)  Verbalizes/displays adequate comfort level or baseline comfort level:   Encourage patient to monitor pain and request assistance   Assess pain using appropriate pain scale   Administer analgesics based on type and severity of pain and evaluate response     Problem: Nutrition Deficit:  Goal: Optimize nutritional status  Outcome: Progressing     Problem: Chronic Conditions and Co-morbidities  Goal: Patient's chronic conditions and co-morbidity symptoms are monitored and maintained or improved  Outcome: Progressing  Flowsheets (Taken 3/4/2024 0902)  Care Plan - Patient's Chronic Conditions and Co-Morbidity Symptoms are Monitored and Maintained or Improved: Monitor and assess patient's chronic conditions and comorbid symptoms for stability, deterioration, or improvement

## 2024-03-04 NOTE — PROGRESS NOTES
[facility] - Inpatient Rehabilitation Department   Phone: [phone]    Occupational Therapy    [] Initial Evaluation            [x] Daily Treatment Note         [] Discharge Summary      Patient: Rissa Becerra   : 1950   MRN: [id number]   Date of Service:  3/4/2024    Admitting Diagnosis:  Acute cerebrovascular accident (CVA) due to ischemia (HCC)  Current Admission Summary: Rissa Becerra is a 73 y.o. female with PMHx notable for HTN, HLD, type 2 diabetes mellitus, CHF, recent R MCA stroke (seen at OSH, started on DAPT and discharged home), who presented on 24 with worsening facial droop, left visual field loss, left sided weakness. She was ambulating with device, with minimal left sided weakness at time of prior hospital discharge, gradually developed worsening weakness resulting in several falls, including one causing L ankle fracture. She returned to [facility] ED. CT Head showed R frontal hypoattenuation. CTA Head/Neck showed L PCA occlusion, R M2 and M3 segmental narrowing. MRI Brain showed R MCA territory infarct. Echo with LVEF 55-60%, negative bubble study. Neurology was consulted, recommending DAPT, statin, cardiac event monitor. Orthopedics Concomitant injuries included: L ankle fracture, for which Ortho was consulted, recommending NWB LLE and non-op management in the context of recent stroke. Hospital course complicated by: aspiration pneumonia, L shoulder pain (CT L shoulder negative for fracture), urinary retention.      Patient reports that she is doing well today. She denies any fevers, chills, chest pain, shortness of breath. Endorses stable L visual field deficits, dysarthria, dysphagia, left sided weakness. Endorses left ankle pain. Endorses urinary retention, requiring straight cath since Kilgore catheter discontinued.      Past Medical History:  has a past medical history of Arthropathy, Asthma, Bell's palsy, Bronchitis, Diabetes mellitus (HCC), Diverticulitis,

## 2024-03-04 NOTE — PROGRESS NOTES
03/03/24 2228   NIV Type   NIV Started/Stopped On   Equipment Type V60   Mode CPAP   Mask Type Full face mask   Mask Size Medium   Assessment   Pulse 64   Respirations 16   SpO2 93 %   Breath Sounds   Breath Sounds Bilateral Clear   Settings/Measurements   PIP Observed 11 cm H20   CPAP/EPAP 10 cmH2O   Vt (Measured) 345 mL   FiO2  30 %   Minute Volume (L/min) 5.2 Liters   Mask Leak (lpm) 7 lpm   Alarm Settings   Alarms On Y   Low Pressure (cmH2O) 3 cmH2O   High Pressure (cmH2O) 20 cmH2O   RR Low (bpm) 8   RR High (bpm) 40 br/min   Oxygen Therapy/Pulse Ox   O2 Therapy Oxygen   O2 Device PAP (positive airway pressure)   Pulse Oximeter Device Mode Continuous   Pulse Oximeter Device Location Finger

## 2024-03-04 NOTE — PROGRESS NOTES
assistance  Lower Body Dressing: Dependent  Toilet Transfer: Dependent  Toilet Hygiene: Dependent    Speech Therapy    Pt continues with mild/moderate cognitive decline characterized by deficits in thought organization, attention, memory, and problem solving in addition to moderate to severe left visual neglect. Pt making improvements with her speech with improved labial coordination/ breath support improving her overall intelligibility within structured conversation. Pt's goals are to continue to work on pitch variation for singing. Pt improving with oral endurance and oral prep phase for tolerating advanced solids with ongoing encouragement and assistance with eating. Recently upgraded to a soft and bite sized diet. Continue to provide pt with more advanced solid trials with the goal for pt to return to a least restrictive diet. Continue with speech therapy for language, cognitive linguistic, and dysphagia therapy to address deficits and to enhance return to prior level of function.    Body mass index is 35.52 kg/m².    Assessment:    This patient continues to require an ARU level of care from all disciplines to address the following issues:    Patient Active Problem List   Diagnosis    Localized edema    Diabetic polyneuropathy (HCC)    Acute CVA (cerebrovascular accident) ([facility])    Left hemiplegia ([facility])    Dysphagia    CAD (coronary artery disease)    DM2 (diabetes mellitus, type 2) ([facility])    HTN (hypertension), benign    Obesity (BMI 30-39.9)    Acute left-sided weakness    Arterial ischemic stroke, ICA, right, acute (HCC)    DM (diabetes mellitus), secondary, with complications ([facility])    Dyslipidemia    Closed displaced fracture of lateral malleolus of left fibula    Acute cerebrovascular accident (CVA) due to ischemia ([facility])       Functional progress: No weekend therapy updates.     Plan:  #. Acute R MCA territory ischemic stroke  - thromoembolic vs cardioembolic  - MRI Brain w/ acute R MCA infarct  - Echo with

## 2024-03-04 NOTE — PROGRESS NOTES
Pt. C/o burning when urinates. Bladder scanned for 412 and cathed for 400.  Urinalysis sent at this time from cath specimen.  Urine very cloudy and foul smelling

## 2024-03-04 NOTE — CARE COORDINATION
(DOUG) received a call from [facility] intake department stating that facility had excepted the patient with an admit date of 3/08/2024 pending a [employer] precert.  CM informed patient and patient's daughter.    CM will continue to follow for support and discharge planning.    Electronically signed by ERI BRYSON on 3/4/2024 at 11:05 AM

## 2024-03-04 NOTE — PROGRESS NOTES
memory skills with 80% accuracy.  Ongoing  Pt will improve functional visual scanning for reading comprehension and functional visual task completion with incorporation of compensatory strategies, to mild cues. Progressing, continue       Therapy Session Time      Session 1 Session 2   Time In 0800 1035   Time Out 0845 1105   Time Code Minutes 12 20   Individual Minutes 45 30     Timed Code Treatment Minutes: 32  Total Treatment Minutes:  75    Electronically Signed By:   SONYA Cox  Speech-Language Pathology Graduate Clinician    The speech-language pathologist was present, directed the patient's care, made skilled judgment and was responsible for assessment and treatment.   Glenny Canseco M.A. [facility] [id number]  Speech-Language Pathologist   3/4/2024 10:01 AM

## 2024-03-04 NOTE — PROGRESS NOTES
03/04/24 0522   Oxygen Therapy/Pulse Ox   O2 Therapy Room air   Pulse 64   Respirations 18   SpO2 93 %   Pulse Oximeter Device Location Finger   $Pulse Oximeter $Spot check (single)

## 2024-03-04 NOTE — PROGRESS NOTES
on second attempt x 2 min.  During static standing, patient requires physical assist to achieve standing + assistance for midline positioning to correct lateral lean.  During standing max VC to attend to (L) visual field.  After completion of standing, patient returned to bed utilizing dependent maxisky lift for dependent bed based pericare due to bowel incontinence.  Patient completes rolling to (R) at mod (A), (L) at CGA during dependent pericare and clothing management.  Maxisky lift utilized for dependent transition from bed => recliner at start of session.  Upon return to room, pt remains up in recliner with chair alarm and call light within reach.  No new complaints following session.    Functional Outcomes                 Cognition  Overall Cognitive Status: Impaired  Arousal/Alertness: appropriate responses to stimuli  Following Commands: follows one step commands consistently  Attention Span: attends with cues to redirect  Insights: decreased awareness of deficits  Initiation: requires cues for some  Sequencing: requires cues for some  Command Following:   accurately follows one step commands    Education  Barriers To Learning: cognition and visual  Patient Education: patient educated on goals, PT role and benefits, plan of care, general safety, functional mobility training, low vision education, disease specific education, transfer training, midline orientation, visual attention to (L) visual field  Learning Assessment:  patient verbalizes understanding, would benefit from continued reinforcement    Assessment  Activity Tolerance: Tolerates all activities with no significant change in pain levels or complaints of fatigue  Impairments Requiring Therapeutic Intervention: decreased functional mobility, decreased ADL status, decreased ROM, decreased strength, decreased safety awareness, decreased cognition, decreased endurance, decreased sensation, decreased balance, decreased vision, increased pain, decreased

## 2024-03-05 LAB
GLUCOSE BLD-MCNC: 107 MG/DL (ref 70–99)
GLUCOSE BLD-MCNC: 116 MG/DL (ref 70–99)
GLUCOSE BLD-MCNC: 133 MG/DL (ref 70–99)
GLUCOSE BLD-MCNC: 154 MG/DL (ref 70–99)
PERFORMED ON: ABNORMAL

## 2024-03-05 PROCEDURE — 92526 ORAL FUNCTION THERAPY: CPT

## 2024-03-05 PROCEDURE — 6370000000 HC RX 637 (ALT 250 FOR IP): Performed by: STUDENT IN AN ORGANIZED HEALTH CARE EDUCATION/TRAINING PROGRAM

## 2024-03-05 PROCEDURE — 6370000000 HC RX 637 (ALT 250 FOR IP)

## 2024-03-05 PROCEDURE — 97530 THERAPEUTIC ACTIVITIES: CPT

## 2024-03-05 PROCEDURE — 6360000002 HC RX W HCPCS: Performed by: STUDENT IN AN ORGANIZED HEALTH CARE EDUCATION/TRAINING PROGRAM

## 2024-03-05 PROCEDURE — 97112 NEUROMUSCULAR REEDUCATION: CPT

## 2024-03-05 PROCEDURE — 1280000000 HC REHAB R&B

## 2024-03-05 PROCEDURE — 97129 THER IVNTJ 1ST 15 MIN: CPT

## 2024-03-05 PROCEDURE — 97535 SELF CARE MNGMENT TRAINING: CPT

## 2024-03-05 PROCEDURE — 92507 TX SP LANG VOICE COMM INDIV: CPT

## 2024-03-05 PROCEDURE — 97130 THER IVNTJ EA ADDL 15 MIN: CPT

## 2024-03-05 RX ORDER — NORTRIPTYLINE HYDROCHLORIDE 10 MG/1
10 CAPSULE ORAL NIGHTLY
Status: DISCONTINUED | OUTPATIENT
Start: 2024-03-05 | End: 2024-03-08 | Stop reason: HOSPADM

## 2024-03-05 RX ADMIN — ENOXAPARIN SODIUM 40 MG: 100 INJECTION SUBCUTANEOUS at 08:27

## 2024-03-05 RX ADMIN — CHOLECALCIFEROL TAB 25 MCG (1000 UNIT) 1000 UNITS: 25 TAB at 08:23

## 2024-03-05 RX ADMIN — ATORVASTATIN CALCIUM 80 MG: 80 TABLET, FILM COATED ORAL at 21:40

## 2024-03-05 RX ADMIN — SACUBITRIL AND VALSARTAN 1 TABLET: 24; 26 TABLET, FILM COATED ORAL at 21:40

## 2024-03-05 RX ADMIN — DULOXETINE HYDROCHLORIDE 30 MG: 30 CAPSULE, DELAYED RELEASE ORAL at 08:26

## 2024-03-05 RX ADMIN — FUROSEMIDE 20 MG: 20 TABLET ORAL at 08:27

## 2024-03-05 RX ADMIN — Medication 2 CAPSULE: at 08:27

## 2024-03-05 RX ADMIN — BETHANECHOL CHLORIDE 50 MG: 25 TABLET ORAL at 08:24

## 2024-03-05 RX ADMIN — NORTRIPTYLINE HYDROCHLORIDE 10 MG: 10 CAPSULE ORAL at 21:42

## 2024-03-05 RX ADMIN — BETHANECHOL CHLORIDE 50 MG: 25 TABLET ORAL at 21:40

## 2024-03-05 RX ADMIN — NITROFURANTOIN MONOHYDRATE/MACROCRYSTALS 100 MG: 75; 25 CAPSULE ORAL at 21:41

## 2024-03-05 RX ADMIN — CARVEDILOL 12.5 MG: 6.25 TABLET, FILM COATED ORAL at 17:36

## 2024-03-05 RX ADMIN — NYSTATIN OINTMENT: 100000 OINTMENT TOPICAL at 08:34

## 2024-03-05 RX ADMIN — Medication 1 TABLET: at 08:33

## 2024-03-05 RX ADMIN — ASPIRIN 81 MG: 81 TABLET, COATED ORAL at 08:24

## 2024-03-05 RX ADMIN — NITROFURANTOIN MONOHYDRATE/MACROCRYSTALS 100 MG: 75; 25 CAPSULE ORAL at 08:26

## 2024-03-05 RX ADMIN — CARVEDILOL 12.5 MG: 6.25 TABLET, FILM COATED ORAL at 08:23

## 2024-03-05 RX ADMIN — METFORMIN HYDROCHLORIDE 1000 MG: 500 TABLET ORAL at 08:26

## 2024-03-05 RX ADMIN — INSULIN GLARGINE 38 UNITS: 100 INJECTION, SOLUTION SUBCUTANEOUS at 08:29

## 2024-03-05 RX ADMIN — TAMSULOSIN HYDROCHLORIDE 0.4 MG: 0.4 CAPSULE ORAL at 21:41

## 2024-03-05 RX ADMIN — METFORMIN HYDROCHLORIDE 1000 MG: 500 TABLET ORAL at 17:36

## 2024-03-05 RX ADMIN — CLOPIDOGREL 75 MG: 75 TABLET, FILM COATED ORAL at 08:27

## 2024-03-05 RX ADMIN — CETIRIZINE HYDROCHLORIDE 10 MG: 10 TABLET, FILM COATED ORAL at 08:27

## 2024-03-05 RX ADMIN — BETHANECHOL CHLORIDE 50 MG: 25 TABLET ORAL at 14:50

## 2024-03-05 RX ADMIN — SACUBITRIL AND VALSARTAN 1 TABLET: 24; 26 TABLET, FILM COATED ORAL at 08:26

## 2024-03-05 RX ADMIN — NYSTATIN OINTMENT: 100000 OINTMENT TOPICAL at 21:40

## 2024-03-05 ASSESSMENT — PAIN SCALES - GENERAL: PAINLEVEL_OUTOF10: 0

## 2024-03-05 NOTE — PLAN OF CARE
Problem: Discharge Planning  Goal: Discharge to home or other facility with appropriate resources  Recent Flowsheet Documentation  Taken 3/5/2024 0830 by Mary Ayala RN  Discharge to home or other facility with appropriate resources: Identify barriers to discharge with patient and caregiver  3/5/2024 0334 by Katia Cyr RN  Outcome: Progressing     Problem: Safety - Adult  Goal: Free from fall injury  3/5/2024 0334 by Katia Cyr RN  Outcome: Progressing     Problem: Skin/Tissue Integrity  Goal: Absence of new skin breakdown  Description: 1.  Monitor for areas of redness and/or skin breakdown  2.  Assess vascular access sites hourly  3.  Every 4-6 hours minimum:  Change oxygen saturation probe site  4.  Every 4-6 hours:  If on nasal continuous positive airway pressure, respiratory therapy assess nares and determine need for appliance change or resting period.  3/5/2024 0334 by Katia Cyr RN  Outcome: Progressing     Problem: ABCDS Injury Assessment  Goal: Absence of physical injury  3/5/2024 0334 by Katia Cyr RN  Outcome: Progressing     Problem: Pain  Goal: Verbalizes/displays adequate comfort level or baseline comfort level  3/5/2024 0334 by Katia Cyr RN  Outcome: Progressing     Problem: Chronic Conditions and Co-morbidities  Goal: Patient's chronic conditions and co-morbidity symptoms are monitored and maintained or improved  Recent Flowsheet Documentation  Taken 3/5/2024 0830 by Mary Ayala RN  Care Plan - Patient's Chronic Conditions and Co-Morbidity Symptoms are Monitored and Maintained or Improved: Monitor and assess patient's chronic conditions and comorbid symptoms for stability, deterioration, or improvement  3/5/2024 0334 by Katia Cyr RN  Outcome: Progressing     Problem: Respiratory - Adult  Goal: Achieves optimal ventilation and oxygenation  Outcome: Progressing

## 2024-03-05 NOTE — PROGRESS NOTES
Individual Group Co-treatment   Time In     1405   Time Out     1450   Minutes     45      Total Treatment Minutes:   98 minutes    Electronically Signed By: Josh Avitia PT, DPT - [id number], 3/5/2024 12:18 PM

## 2024-03-05 NOTE — PROGRESS NOTES
decline characterized by deficits in thought organization, attention, memory, and problem solving in addition to moderate to severe left visual neglect. Pt making improvements with her speech with improved labial coordination/ breath support improving her overall intelligibility within structured conversation. Pt's goals are to continue to work on pitch variation for singing. Pt improving with oral endurance and oral prep phase for tolerating advanced solids with ongoing encouragement and assistance with eating. Recently upgraded to a soft and bite sized diet. Continue to provide pt with more advanced solid trials with the goal for pt to return to a least restrictive diet. Continue with speech therapy for language, cognitive linguistic, and dysphagia therapy to address deficits and to enhance return to prior level of function.    Body mass index is 34.66 kg/m².    Assessment:    This patient continues to require an ARU level of care from all disciplines to address the following issues:    Patient Active Problem List   Diagnosis    Localized edema    Diabetic polyneuropathy (HCC)    Acute CVA (cerebrovascular accident) ([facility])    Left hemiplegia (HCC)    Dysphagia    CAD (coronary artery disease)    DM2 (diabetes mellitus, type 2) ([facility])    HTN (hypertension), benign    Obesity (BMI 30-39.9)    Acute left-sided weakness    Arterial ischemic stroke, ICA, right, acute (HCC)    DM (diabetes mellitus), secondary, with complications ([facility])    Dyslipidemia    Closed displaced fracture of lateral malleolus of left fibula    Acute cerebrovascular accident (CVA) due to ischemia ([facility])       Functional progress: No weekend therapy updates.     Plan:  #. Acute R MCA territory ischemic stroke  - thromoembolic vs cardioembolic  - MRI Brain w/ acute R MCA infarct  - Echo with LVEF 55-60%, indeterminate diastolic dysfunction, RV dilation, negative bubble study  - ASA, clopidigrel, statin  - cardiac event monior x 30 days    #. Suspected

## 2024-03-05 NOTE — PROGRESS NOTES
[facility] - Inpatient Rehabilitation Department   Phone: [phone]    Speech Therapy    [] Initial Evaluation            [x] Daily Treatment Note         [] Discharge Summary      Patient: Rissa Becerra   : 1950   MRN: [id number]   Date of Service:  3/5/2024  Admitting Diagnosis: Acute cerebrovascular accident (CVA) due to ischemia (HCC)  Current Admission Summary: Rissa Becerra is a 73 y.o. female with history of DM 2, CAD, chronic combined CHF, HTN, recent diagnosis of stroke at [facility] a few days came to ER with complaints of worsening L sided weakness and falls.  Patient has slurred speech and L facial droop at [facility].  She was discharged to home with home care.  Family is very supportive.  Noted she had progressing weakness and falls.  Today, noted to have flaccid LUE/LLE.  Brought to ER for evaluation.  No CP, SOB, HA or dizziness.  Patient is awake and answering questions.  , daughters and son are at bedside with her today.  Otherwise complete ROS is negative unless listed above.   Past Medical History:  has a past medical history of Arthropathy, Asthma, Bell's palsy, Bronchitis, Diabetes mellitus (HCC), Diverticulitis, Hyperlipidemia, Hypertension, Migraine, Polyneuropathy in diabetes ([facility]), and Vertigo.  Past Surgical History:  has a past surgical history that includes hernia repair; Carpal tunnel release; Total knee arthroplasty; Rotator cuff repair; and Percutaneous Transluminal Coronary Angio (2023).  Recent Chest xray: Decreased perihilar airspace disease on the right   Recent MRI Brain: 1. Scattered areas of acute infarct within the right MCA territory.  No  significant mass effect or midline shift.  2. Otherwise, no acute intracranial abnormality.  3. Mild to moderate global parenchymal volume loss with mild chronic  microvascular ischemic changes.  Instrumental Swallow Study: Modified Barium Swallow evaluation completed on 2024.Patient

## 2024-03-05 NOTE — PROGRESS NOTES
extensive assist of 2 therapist + assist from 3rd person for WB precaution adherence. Pt limited by L hemiplegia, L  inattention, impaired balance, and deficits in cognition. Skilled OT warranted to improve safety and independence in occupational pursuits in order to promote return to PLOF    Safety Interventions: patient left in chair, call light within reach, patient at risk for falls, and family/caregiver present       Plan  Frequency: 5 x/week, 60 min/day  Current Treatment Recommendations: strengthening, ROM, balance training, functional mobility training, transfer training, endurance training, neuromuscular re-education, wheelchair mobility training, modalities, patient/caregiver education, ADL/self-care training, IADL training, pain management, home exercise program, safety education, equipment evaluation/education, and positioning  Goals  Patient Goals: go to the bathroom on the toilet   Short Term Goals:  Time Frame: two weeks  Patient will complete toileting at maximum assistance   Patient will complete functional transfers at maximum assistance   Patient will increase functional sitting balance to CGA for improved ADL completion  Patient will complete bed mobility at minimal assistance   Patient will attend to L visual field with min verbal cues during functional activity.  Long Term Goals:  Time Frame: three weeks  Patient will complete upper body ADL at minimal assistance   Patient will complete lower body ADL at minimal assistance   Patient will complete toileting at minimal assistance   Patient will complete functional transfers at minimal assistance   Patient will complete functional mobility at stand by assistance, in LRAD      Above goals reviewed on 3/5/2024.  All goals are ongoing at this time unless indicated above.       Therapy Session Time     Individual Group Co-treatment   Time In   0915   Time Out   1008   Minutes   53      Second Session Therapy Time:   Individual Concurrent Group

## 2024-03-06 LAB
ANION GAP SERPL CALCULATED.3IONS-SCNC: 11 MMOL/L (ref 3–16)
BACTERIA UR CULT: ABNORMAL
BUN SERPL-MCNC: 33 MG/DL (ref 7–20)
CALCIUM SERPL-MCNC: 9.3 MG/DL (ref 8.3–10.6)
CHLORIDE SERPL-SCNC: 101 MMOL/L (ref 99–110)
CO2 SERPL-SCNC: 25 MMOL/L (ref 21–32)
CREAT SERPL-MCNC: 0.7 MG/DL (ref 0.6–1.2)
GFR SERPLBLD CREATININE-BSD FMLA CKD-EPI: >60 ML/MIN/{1.73_M2}
GLUCOSE BLD-MCNC: 116 MG/DL (ref 70–99)
GLUCOSE BLD-MCNC: 141 MG/DL (ref 70–99)
GLUCOSE BLD-MCNC: 156 MG/DL (ref 70–99)
GLUCOSE BLD-MCNC: 157 MG/DL (ref 70–99)
GLUCOSE BLD-MCNC: 161 MG/DL (ref 70–99)
GLUCOSE SERPL-MCNC: 143 MG/DL (ref 70–99)
ORGANISM: ABNORMAL
PERFORMED ON: ABNORMAL
POTASSIUM SERPL-SCNC: 4.8 MMOL/L (ref 3.5–5.1)
SODIUM SERPL-SCNC: 137 MMOL/L (ref 136–145)

## 2024-03-06 PROCEDURE — 80048 BASIC METABOLIC PNL TOTAL CA: CPT

## 2024-03-06 PROCEDURE — 97530 THERAPEUTIC ACTIVITIES: CPT

## 2024-03-06 PROCEDURE — 6370000000 HC RX 637 (ALT 250 FOR IP)

## 2024-03-06 PROCEDURE — 97130 THER IVNTJ EA ADDL 15 MIN: CPT

## 2024-03-06 PROCEDURE — 51798 US URINE CAPACITY MEASURE: CPT

## 2024-03-06 PROCEDURE — 6360000002 HC RX W HCPCS: Performed by: STUDENT IN AN ORGANIZED HEALTH CARE EDUCATION/TRAINING PROGRAM

## 2024-03-06 PROCEDURE — 97129 THER IVNTJ 1ST 15 MIN: CPT

## 2024-03-06 PROCEDURE — 97535 SELF CARE MNGMENT TRAINING: CPT

## 2024-03-06 PROCEDURE — 36415 COLL VENOUS BLD VENIPUNCTURE: CPT

## 2024-03-06 PROCEDURE — 92526 ORAL FUNCTION THERAPY: CPT

## 2024-03-06 PROCEDURE — 97112 NEUROMUSCULAR REEDUCATION: CPT

## 2024-03-06 PROCEDURE — 1280000000 HC REHAB R&B

## 2024-03-06 PROCEDURE — 6370000000 HC RX 637 (ALT 250 FOR IP): Performed by: STUDENT IN AN ORGANIZED HEALTH CARE EDUCATION/TRAINING PROGRAM

## 2024-03-06 RX ADMIN — NITROFURANTOIN MONOHYDRATE/MACROCRYSTALS 100 MG: 75; 25 CAPSULE ORAL at 08:17

## 2024-03-06 RX ADMIN — BETHANECHOL CHLORIDE 50 MG: 25 TABLET ORAL at 13:48

## 2024-03-06 RX ADMIN — NITROFURANTOIN MONOHYDRATE/MACROCRYSTALS 100 MG: 75; 25 CAPSULE ORAL at 22:43

## 2024-03-06 RX ADMIN — Medication 1 TABLET: at 08:15

## 2024-03-06 RX ADMIN — BETHANECHOL CHLORIDE 50 MG: 25 TABLET ORAL at 22:42

## 2024-03-06 RX ADMIN — INSULIN GLARGINE 38 UNITS: 100 INJECTION, SOLUTION SUBCUTANEOUS at 08:13

## 2024-03-06 RX ADMIN — METFORMIN HYDROCHLORIDE 1000 MG: 500 TABLET ORAL at 08:16

## 2024-03-06 RX ADMIN — CLOPIDOGREL 75 MG: 75 TABLET, FILM COATED ORAL at 08:16

## 2024-03-06 RX ADMIN — NORTRIPTYLINE HYDROCHLORIDE 10 MG: 10 CAPSULE ORAL at 22:42

## 2024-03-06 RX ADMIN — CARVEDILOL 12.5 MG: 6.25 TABLET, FILM COATED ORAL at 18:58

## 2024-03-06 RX ADMIN — NYSTATIN OINTMENT: 100000 OINTMENT TOPICAL at 22:43

## 2024-03-06 RX ADMIN — ATORVASTATIN CALCIUM 80 MG: 80 TABLET, FILM COATED ORAL at 22:42

## 2024-03-06 RX ADMIN — Medication 2 CAPSULE: at 08:15

## 2024-03-06 RX ADMIN — SACUBITRIL AND VALSARTAN 1 TABLET: 24; 26 TABLET, FILM COATED ORAL at 08:15

## 2024-03-06 RX ADMIN — FUROSEMIDE 20 MG: 20 TABLET ORAL at 08:16

## 2024-03-06 RX ADMIN — ASPIRIN 81 MG: 81 TABLET, COATED ORAL at 08:16

## 2024-03-06 RX ADMIN — TAMSULOSIN HYDROCHLORIDE 0.4 MG: 0.4 CAPSULE ORAL at 22:42

## 2024-03-06 RX ADMIN — CARVEDILOL 12.5 MG: 6.25 TABLET, FILM COATED ORAL at 08:15

## 2024-03-06 RX ADMIN — CETIRIZINE HYDROCHLORIDE 10 MG: 10 TABLET, FILM COATED ORAL at 08:16

## 2024-03-06 RX ADMIN — SACUBITRIL AND VALSARTAN 1 TABLET: 24; 26 TABLET, FILM COATED ORAL at 22:42

## 2024-03-06 RX ADMIN — ENOXAPARIN SODIUM 40 MG: 100 INJECTION SUBCUTANEOUS at 08:14

## 2024-03-06 RX ADMIN — DULOXETINE HYDROCHLORIDE 30 MG: 30 CAPSULE, DELAYED RELEASE ORAL at 08:17

## 2024-03-06 RX ADMIN — METFORMIN HYDROCHLORIDE 1000 MG: 500 TABLET ORAL at 18:18

## 2024-03-06 RX ADMIN — NYSTATIN OINTMENT: 100000 OINTMENT TOPICAL at 08:17

## 2024-03-06 RX ADMIN — CHOLECALCIFEROL TAB 25 MCG (1000 UNIT) 1000 UNITS: 25 TAB at 08:16

## 2024-03-06 RX ADMIN — BETHANECHOL CHLORIDE 50 MG: 25 TABLET ORAL at 08:16

## 2024-03-06 ASSESSMENT — PAIN DESCRIPTION - ORIENTATION
ORIENTATION: LEFT
ORIENTATION: LEFT

## 2024-03-06 ASSESSMENT — PAIN DESCRIPTION - LOCATION
LOCATION: ARM
LOCATION: ARM

## 2024-03-06 ASSESSMENT — PAIN SCALES - GENERAL
PAINLEVEL_OUTOF10: 3
PAINLEVEL_OUTOF10: 0
PAINLEVEL_OUTOF10: 0
PAINLEVEL_OUTOF10: 3

## 2024-03-06 ASSESSMENT — PAIN DESCRIPTION - DESCRIPTORS
DESCRIPTORS: PINS AND NEEDLES
DESCRIPTORS: PINS AND NEEDLES

## 2024-03-06 NOTE — PROGRESS NOTES
visual field with min verbal cues during functional activity.  Long Term Goals:  Time Frame: three weeks  Patient will complete upper body ADL at minimal assistance   Patient will complete lower body ADL at minimal assistance   Patient will complete toileting at minimal assistance   Patient will complete functional transfers at minimal assistance   Patient will complete functional mobility at stand by assistance, in LRAD      Above goals reviewed on 3/6/2024.  All goals are ongoing at this time unless indicated above.       Therapy Session Time     Individual Group Co-treatment   Time In   0848   Time Out   0915   Minutes   27      Second Session Therapy Time:   Individual Concurrent Group Co-treatment   Time In 1415     1245   Time Out 1440     1340   Minutes 25     55       Timed Code Treatment Minutes: 27 +55 +25    Total Treatment Minutes: 107    Electronically Signed By:     YANNI Aguilera/JACQUELIN #[id number] 3/6/2024 3:41 PM

## 2024-03-06 NOTE — PLAN OF CARE
Problem: Discharge Planning  Goal: Discharge to home or other facility with appropriate resources  Outcome: Progressing  Flowsheets (Taken 3/6/2024 1438)  Discharge to home or other facility with appropriate resources:   Identify barriers to discharge with patient and caregiver   Identify discharge learning needs (meds, wound care, etc)   Refer to discharge planning if patient needs post-hospital services based on physician order or complex needs related to functional status, cognitive ability or social support system   Arrange for needed discharge resources and transportation as appropriate     Problem: Safety - Adult  Goal: Free from fall injury  Outcome: Progressing  Flowsheets (Taken 3/6/2024 1438)  Free From Fall Injury: Instruct family/caregiver on patient safety     Problem: Skin/Tissue Integrity  Goal: Absence of new skin breakdown  Description: 1.  Monitor for areas of redness and/or skin breakdown  2.  Assess vascular access sites hourly  3.  Every 4-6 hours minimum:  Change oxygen saturation probe site  4.  Every 4-6 hours:  If on nasal continuous positive airway pressure, respiratory therapy assess nares and determine need for appliance change or resting period.  Outcome: Progressing     Problem: ABCDS Injury Assessment  Goal: Absence of physical injury  Outcome: Progressing  Flowsheets (Taken 3/6/2024 1438)  Absence of Physical Injury: Implement safety measures based on patient assessment     Problem: Pain  Goal: Verbalizes/displays adequate comfort level or baseline comfort level  Outcome: Progressing  Flowsheets (Taken 3/6/2024 1438)  Verbalizes/displays adequate comfort level or baseline comfort level:   Encourage patient to monitor pain and request assistance   Consider cultural and social influences on pain and pain management   Administer analgesics based on type and severity of pain and evaluate response   Assess pain using appropriate pain scale     Problem: Nutrition Deficit:  Goal: Optimize

## 2024-03-06 NOTE — PROGRESS NOTES
more effective for neuropathic pain), nortriptyline 10 mg qhs for headache  DVT PPx: Lovenox 40 mg daily   Follow-ups: Neurology, Ortho, Cardiology, PCP  ELOS: 22 days    Ottoniel Greene MD  3/6/2024, 4:41 PM    * This document was created using dictation software.  While all precautions were taken to ensure accuracy, errors may have occurred.  Please disregard any typographical errors.

## 2024-03-06 NOTE — PROGRESS NOTES
mechanical maxisky/maximove lift system from w/c to recliner chair.  Comments:   Ambulation:  Ambulation not tested on this date secondary to extensive assist required to maintain static standing.  Comments:   Stair Mobility:  Stair mobility not completed on this date.  Comments:  Wheelchair Mobility:  Chair: manual  Surface: level surface  Method: (R) UE  Distance: 205 ft  Assistance: minimal assistance  Comments: Patient completes propulsion component, requiring guidance for steering/maneuverability  Balance:  Static Sitting Balance: fair (-): maintains balance at CGA with use of UE support  Dynamic Sitting Balance: poor: requires mod (A) to maintain balance  Comments:     Other Therapeutic Interventions  1st Session:   See above functional mobility.  Session begins with bed based LB dressing with rolling completed as documented above.  UB dressing completed in w/c following slide board transition, requiring up to mod (A) for balance stabilization in midline position to allow improved functional use of UE during OT led ADL completion.  Patient completes three stands at (R) coffey rail from / requiring max (A) of 2 therapist to achieve and maintaining standing in addition to non-skilled third person assist to elevate (L) LE to ensure WB adherence. Patient requires max VC with use of fixed wall to facilitate (R) anterior lean. Patient has one episode of (R) knee buckle during standing requiring assisted transition back to w/c.    2nd Session:   Pt seated in recliner upon arrival, agreeable to PT session.  Maxisky dependent lift utilized for transition from recliner => w/c.  Slide board transfers completed from w/c <=> therapy mat within session requiring mod/max (A) of 2.  Patient able to initiate scoot to assist in beginning of transfer but requires ongoing max (A) of 2 at conclusion of each slide board completion.  Patient completes unsupported seated activities on therapy mat including: modified sit ups, (R) UE

## 2024-03-06 NOTE — PROGRESS NOTES
[facility] - Inpatient Rehabilitation Department   Phone: [phone]    Speech Therapy    [] Initial Evaluation            [x] Daily Treatment Note         [] Discharge Summary      Patient: Rissa Becerra   : 1950   MRN: [id number]   Date of Service:  3/6/2024  Admitting Diagnosis: Acute cerebrovascular accident (CVA) due to ischemia (HCC)  Current Admission Summary: Rissa Becerra is a 73 y.o. female with history of DM 2, CAD, chronic combined CHF, HTN, recent diagnosis of stroke at [facility] a few days came to ER with complaints of worsening L sided weakness and falls.  Patient has slurred speech and L facial droop at [facility].  She was discharged to home with home care.  Family is very supportive.  Noted she had progressing weakness and falls.  Today, noted to have flaccid LUE/LLE.  Brought to ER for evaluation.  No CP, SOB, HA or dizziness.  Patient is awake and answering questions.  , daughters and son are at bedside with her today.  Otherwise complete ROS is negative unless listed above.   Past Medical History:  has a past medical history of Arthropathy, Asthma, Bell's palsy, Bronchitis, Diabetes mellitus (HCC), Diverticulitis, Hyperlipidemia, Hypertension, Migraine, Polyneuropathy in diabetes ([facility]), and Vertigo.  Past Surgical History:  has a past surgical history that includes hernia repair; Carpal tunnel release; Total knee arthroplasty; Rotator cuff repair; and Percutaneous Transluminal Coronary Angio (2023).  Recent Chest xray: Decreased perihilar airspace disease on the right   Recent MRI Brain: 1. Scattered areas of acute infarct within the right MCA territory.  No  significant mass effect or midline shift.  2. Otherwise, no acute intracranial abnormality.  3. Mild to moderate global parenchymal volume loss with mild chronic  microvascular ischemic changes.  Instrumental Swallow Study: Modified Barium Swallow evaluation completed on 2024.Patient

## 2024-03-06 NOTE — PATIENT CARE CONFERENCE
mobility, static standing, ADL training with keira-techniques, introduced to reacher, dysphagia/ speech and cognitive intervention   Goals still appropriate:  Yes  Modifications to goals: None  Continue Current Plan of Care:  Yes  Modifications to Plan of Care: None    Rehab Team Members in attendance for Team Conference:  Shanell Grover, MSW, LSW    Adrianne Mehta, RD, LD    Manav Escoto, OTR/L    Josh Avitia, PT, DPT    Suzanne Rogers M.A., CCC-SLP    DELGADO NewsomeN, RN, CRRMILAGRO Barajas PT, DPT,     I, the Rehab Physician, led the above team conference and agree with all decisions made, and approve the established interdisciplinary plan of care as documented within the medical record of Rissa Becerra.    Ottoniel Greene MD

## 2024-03-06 NOTE — CARE COORDINATION
Patient's [employer] Pre-cert Authorization approved to discharge to [facility] ([place], [place]) on 3/08/2024.  Pre-cert authorization ID # is [id number].     will inform ARU team and patient's spouse.     will continue to follow for support and discharge planning.    Electronically signed by ERI BRYSON on 3/6/2024 at 2:29 PM

## 2024-03-06 NOTE — PLAN OF CARE
Problem: Discharge Planning  Goal: Discharge to home or other facility with appropriate resources  Outcome: Progressing     Problem: Safety - Adult  Goal: Free from fall injury  Outcome: Progressing     Problem: Skin/Tissue Integrity  Goal: Absence of new skin breakdown  Description: 1.  Monitor for areas of redness and/or skin breakdown  2.  Assess vascular access sites hourly  3.  Every 4-6 hours minimum:  Change oxygen saturation probe site  4.  Every 4-6 hours:  If on nasal continuous positive airway pressure, respiratory therapy assess nares and determine need for appliance change or resting period.  Outcome: Progressing     Problem: ABCDS Injury Assessment  Goal: Absence of physical injury  Outcome: Progressing     Problem: Pain  Goal: Verbalizes/displays adequate comfort level or baseline comfort level  Outcome: Progressing     Problem: Chronic Conditions and Co-morbidities  Goal: Patient's chronic conditions and co-morbidity symptoms are monitored and maintained or improved  Outcome: Progressing     Problem: Respiratory - Adult  Goal: Achieves optimal ventilation and oxygenation  3/5/2024 2344 by Katia Cyr, RN  Outcome: Progressing  3/5/2024 1249 by Mary Ayala, RN  Outcome: Progressing

## 2024-03-07 LAB
GLUCOSE BLD-MCNC: 116 MG/DL (ref 70–99)
GLUCOSE BLD-MCNC: 138 MG/DL (ref 70–99)
GLUCOSE BLD-MCNC: 198 MG/DL (ref 70–99)
GLUCOSE BLD-MCNC: 207 MG/DL (ref 70–99)
PERFORMED ON: ABNORMAL

## 2024-03-07 PROCEDURE — 97129 THER IVNTJ 1ST 15 MIN: CPT

## 2024-03-07 PROCEDURE — 92507 TX SP LANG VOICE COMM INDIV: CPT

## 2024-03-07 PROCEDURE — 1280000000 HC REHAB R&B

## 2024-03-07 PROCEDURE — 92526 ORAL FUNCTION THERAPY: CPT

## 2024-03-07 PROCEDURE — 6360000002 HC RX W HCPCS: Performed by: STUDENT IN AN ORGANIZED HEALTH CARE EDUCATION/TRAINING PROGRAM

## 2024-03-07 PROCEDURE — 97530 THERAPEUTIC ACTIVITIES: CPT

## 2024-03-07 PROCEDURE — 97535 SELF CARE MNGMENT TRAINING: CPT

## 2024-03-07 PROCEDURE — 51798 US URINE CAPACITY MEASURE: CPT

## 2024-03-07 PROCEDURE — 97110 THERAPEUTIC EXERCISES: CPT

## 2024-03-07 PROCEDURE — 6370000000 HC RX 637 (ALT 250 FOR IP)

## 2024-03-07 PROCEDURE — 6370000000 HC RX 637 (ALT 250 FOR IP): Performed by: STUDENT IN AN ORGANIZED HEALTH CARE EDUCATION/TRAINING PROGRAM

## 2024-03-07 PROCEDURE — 97130 THER IVNTJ EA ADDL 15 MIN: CPT

## 2024-03-07 RX ORDER — DULOXETIN HYDROCHLORIDE 60 MG/1
60 CAPSULE, DELAYED RELEASE ORAL DAILY
Status: DISCONTINUED | OUTPATIENT
Start: 2024-03-08 | End: 2024-03-08 | Stop reason: HOSPADM

## 2024-03-07 RX ORDER — DULOXETIN HYDROCHLORIDE 30 MG/1
30 CAPSULE, DELAYED RELEASE ORAL ONCE
Status: COMPLETED | OUTPATIENT
Start: 2024-03-07 | End: 2024-03-07

## 2024-03-07 RX ORDER — CARBOXYMETHYLCELLULOSE SODIUM 10 MG/ML
1 GEL OPHTHALMIC PRN
Status: DISCONTINUED | OUTPATIENT
Start: 2024-03-07 | End: 2024-03-08 | Stop reason: HOSPADM

## 2024-03-07 RX ADMIN — NITROFURANTOIN MONOHYDRATE/MACROCRYSTALS 100 MG: 75; 25 CAPSULE ORAL at 08:34

## 2024-03-07 RX ADMIN — ASPIRIN 81 MG: 81 TABLET, COATED ORAL at 08:32

## 2024-03-07 RX ADMIN — METFORMIN HYDROCHLORIDE 1000 MG: 500 TABLET ORAL at 08:38

## 2024-03-07 RX ADMIN — SACUBITRIL AND VALSARTAN 1 TABLET: 24; 26 TABLET, FILM COATED ORAL at 22:21

## 2024-03-07 RX ADMIN — DULOXETINE HYDROCHLORIDE 30 MG: 30 CAPSULE, DELAYED RELEASE ORAL at 08:38

## 2024-03-07 RX ADMIN — NITROFURANTOIN MONOHYDRATE/MACROCRYSTALS 100 MG: 75; 25 CAPSULE ORAL at 22:21

## 2024-03-07 RX ADMIN — METFORMIN HYDROCHLORIDE 1000 MG: 500 TABLET ORAL at 15:47

## 2024-03-07 RX ADMIN — NORTRIPTYLINE HYDROCHLORIDE 10 MG: 10 CAPSULE ORAL at 22:30

## 2024-03-07 RX ADMIN — FUROSEMIDE 20 MG: 20 TABLET ORAL at 08:33

## 2024-03-07 RX ADMIN — NYSTATIN OINTMENT: 100000 OINTMENT TOPICAL at 08:37

## 2024-03-07 RX ADMIN — Medication 1 TABLET: at 08:31

## 2024-03-07 RX ADMIN — CHOLECALCIFEROL TAB 25 MCG (1000 UNIT) 1000 UNITS: 25 TAB at 08:35

## 2024-03-07 RX ADMIN — BETHANECHOL CHLORIDE 50 MG: 25 TABLET ORAL at 15:47

## 2024-03-07 RX ADMIN — CARVEDILOL 12.5 MG: 6.25 TABLET, FILM COATED ORAL at 15:47

## 2024-03-07 RX ADMIN — CLOPIDOGREL 75 MG: 75 TABLET, FILM COATED ORAL at 08:33

## 2024-03-07 RX ADMIN — BETHANECHOL CHLORIDE 50 MG: 25 TABLET ORAL at 08:34

## 2024-03-07 RX ADMIN — BETHANECHOL CHLORIDE 50 MG: 25 TABLET ORAL at 22:21

## 2024-03-07 RX ADMIN — NYSTATIN OINTMENT: 100000 OINTMENT TOPICAL at 22:22

## 2024-03-07 RX ADMIN — Medication 2 CAPSULE: at 08:32

## 2024-03-07 RX ADMIN — CARVEDILOL 12.5 MG: 6.25 TABLET, FILM COATED ORAL at 08:35

## 2024-03-07 RX ADMIN — ACETAMINOPHEN 650 MG: 325 TABLET ORAL at 22:21

## 2024-03-07 RX ADMIN — SACUBITRIL AND VALSARTAN 1 TABLET: 24; 26 TABLET, FILM COATED ORAL at 08:33

## 2024-03-07 RX ADMIN — INSULIN GLARGINE 38 UNITS: 100 INJECTION, SOLUTION SUBCUTANEOUS at 08:31

## 2024-03-07 RX ADMIN — TAMSULOSIN HYDROCHLORIDE 0.4 MG: 0.4 CAPSULE ORAL at 22:21

## 2024-03-07 RX ADMIN — CETIRIZINE HYDROCHLORIDE 10 MG: 10 TABLET, FILM COATED ORAL at 08:35

## 2024-03-07 RX ADMIN — ENOXAPARIN SODIUM 40 MG: 100 INJECTION SUBCUTANEOUS at 08:38

## 2024-03-07 RX ADMIN — DULOXETINE HYDROCHLORIDE 30 MG: 30 CAPSULE, DELAYED RELEASE ORAL at 15:47

## 2024-03-07 RX ADMIN — ATORVASTATIN CALCIUM 80 MG: 80 TABLET, FILM COATED ORAL at 22:21

## 2024-03-07 ASSESSMENT — PAIN DESCRIPTION - ONSET: ONSET: ON-GOING

## 2024-03-07 ASSESSMENT — PAIN DESCRIPTION - LOCATION: LOCATION: GENERALIZED

## 2024-03-07 ASSESSMENT — PAIN - FUNCTIONAL ASSESSMENT: PAIN_FUNCTIONAL_ASSESSMENT: PREVENTS OR INTERFERES SOME ACTIVE ACTIVITIES AND ADLS

## 2024-03-07 ASSESSMENT — PAIN DESCRIPTION - FREQUENCY: FREQUENCY: INTERMITTENT

## 2024-03-07 ASSESSMENT — PAIN SCALES - GENERAL
PAINLEVEL_OUTOF10: 9
PAINLEVEL_OUTOF10: 0

## 2024-03-07 ASSESSMENT — PAIN DESCRIPTION - PAIN TYPE: TYPE: ACUTE PAIN

## 2024-03-07 ASSESSMENT — PAIN DESCRIPTION - ORIENTATION: ORIENTATION: OTHER (COMMENT)

## 2024-03-07 ASSESSMENT — PAIN SCALES - WONG BAKER: WONGBAKER_NUMERICALRESPONSE: 0

## 2024-03-07 ASSESSMENT — PAIN DESCRIPTION - DESCRIPTORS: DESCRIPTORS: ACHING;SORE

## 2024-03-07 NOTE — PROGRESS NOTES
Assessment complete. Alert, oriented x4. Denies pain. Reported urinary incontinence earlier this shift. Bladder scanned at this time for 299 mL. Diaper clean. Refused hospital CPAP. SpO2 94% on room air. The care plan and education has been reviewed and mutually agreed upon with the patient. In bed, alarm on, bed in lowest position, call light and table within reach. No further needs expressed at this time.    0135  Patient awoken. SpO2 89% on room air. Placed on 1L supplemental O2 per nasal cannula; SpO2 95%.    0225  Reported urinary incontinence. Bladder scanned post-void for 137 mL. Cleaned, changed, repositioned. SpO2 93% on 1L O2 per nasal cannula.    0615  Complaining of right posterior hip discomfort. Repositioned. Biofreeze applied to site. Patient reports relief with Biofreeze application.

## 2024-03-07 NOTE — PROGRESS NOTES
stroke recovery techniques.  Patient has demonstrated improvements in (L) visual attention and static/dynamic sitting balance but continues to present with frequent lateral lean with pushing syndrome, requiring consistent physical assistance for midline orientation and balance correction.  Bed mobility progressed to one caregiver assistance prior to discharge.  Patient continues to require extensive assist of 3 to achieve standing, and requires max (A) of 2 for slide board completion.   Prior to hospitalization patient was independent in home and community.    Safety Interventions: patient left in chair, chair alarm in place, call light within reach, gait belt, and family/caregiver present    Plan  Frequency: 5 x/week, 60 min/day  Current Treatment Recommendations: strengthening, ROM, balance training, functional mobility training, transfer training, gait training, stair training, endurance training, neuromuscular re-education, wheelchair mobility training, manual therapy - soft tissue massage, modalities, patient/caregiver education, ADL/self-care training, cognitive/perceptual training, and pain management    Goals  Patient Goals: Get self to the bathroom   Short Term Goals:  Time Frame: 1 week  Patient will complete bed mobility at moderate assistance   - Goal not met  Patient will complete transfers at 2 person assistance with Mod A   - Goal not met  Patient will complete manual w/c propulsion 25 ft at moderate assistance - Goal met                  Long Term Goals:  Time Frame: 3 weeks  Patient will complete bed mobility at minimal assistance   - Goal not met  Patient will complete transfers at moderate assistance   - Goal not met  Patient will complete car transfer at moderate assistance  - Goal not met  Patient will complete manual w/c propulsion 150 ft at supervision   - Goal not met      Therapy Session Time      Individual Group Co-treatment   Time In      0920   Time Out      1015   Minutes      55

## 2024-03-07 NOTE — CARE COORDINATION
(CM) gave patient/spouse a home health care provider list.    Electronically signed by ERI BRYSON on 3/7/2024 at 2:59 PM

## 2024-03-07 NOTE — PROGRESS NOTES
Noted over 600ml per bladder scanner.  The pt declined to be straight cath and wanted to urinate on her own.  Assisted the pt to lay on her left side with head elevated 25 degree per her request.  About 40 min later, the pt had urinary incontinency.  Noted post void residual of 304ml per bladder scanner.

## 2024-03-07 NOTE — DISCHARGE INSTR - COC
Continuity of Care Form    Patient Name: Rissa Becerra   :  1950  MRN:  [id number]    Admit date:  2024  Discharge date:  3/8/2024    Code Status Order: DNR-CCA   Advance Directives:     Admitting Physician:  Ottoniel Greene MD  PCP: Stephanie Oneill MD    Discharging Nurse: Dilip Pope  Discharging Hospital Unit/Room#: ARU-4905/4905-01  Discharging Unit Phone Number: [phone]    Emergency Contact:   Extended Emergency Contact Information  Primary Emergency Contact: Flo Becerra  Home Phone: [phone]  Relation: Spouse  Preferred language: English   needed? No    Past Surgical History:  Past Surgical History:   Procedure Laterality Date    CARPAL TUNNEL RELEASE      HERNIA REPAIR      PTCA  2023    ROTATOR CUFF REPAIR      right shoulder    TOTAL KNEE ARTHROPLASTY      right and left        Immunization History:   Immunization History   Administered Date(s) Administered    COVID-19, PFIZER Bivalent, DO NOT Dilute, (age 12y+), IM, 30 mcg/0.3 mL 2022       Active Problems:  Patient Active Problem List   Diagnosis Code    Localized edema R60.0    Diabetic polyneuropathy (HCC) E11.42    Acute CVA (cerebrovascular accident) ([facility]) I63.9    Left hemiplegia ([facility]) G81.94    Dysphagia R13.10    CAD (coronary artery disease) I25.10    DM2 (diabetes mellitus, type 2) ([facility]) E11.9    HTN (hypertension), benign I10    Obesity (BMI 30-39.9) E66.9    Acute left-sided weakness R53.1    Arterial ischemic stroke, ICA, right, acute ([facility]) I63.231    DM (diabetes mellitus), secondary, with complications ([facility]) E13.8    Dyslipidemia E78.5    Closed displaced fracture of lateral malleolus of left fibula S82.62XA    Acute cerebrovascular accident (CVA) due to ischemia ([facility]) I63.9       Isolation/Infection:   Isolation            No Isolation          Patient Infection Status       None to display                     Nurse Assessment:  Last Vital Signs: /70   Pulse 64   Temp 98 °F (36.7 °C)

## 2024-03-07 NOTE — CARE COORDINATION
Team conference held today. Spoke with patient and her spouse  to discuss progress with therapy, barriers to discharge, and plans to return home. Estimated discharge date set for 3/7/2024. Patient anticipates discharging to [facility]. Will continue to follow for support and discharge planning.    Electronically signed by ERI BRYSON on 3/7/2024 at 1:44 PM

## 2024-03-07 NOTE — DISCHARGE INSTRUCTIONS
We hope your stay on rehab has exceeded your expectations. Once again the entire Acute Rehab Staff at [facility] wish to thank you for allowing us the privilege to care for you.         A few days after you are discharged from Rehab, you will receive a survey (Nellie  Ganjenifer) in the mail or through email.  This is a nationally distributed survey sent to thousands of rehab patients throughout the nation.              It is very important to everyone on the rehab unit that we receive feedback based on your experience.          Thank you, we wish you good health always,         Acute Rehab Team      Hospital Preference:     __[facility]        Medical Diagnosis/Conditions    _______________________ (free text)    Emergency Contact:    ________________________________________Phone#________________________      Advanced Directives:    Code Status: ?  []  Full Code  ?  []  DNR  ? []  DNRCC  ? []  DNRCC - Arrest    (as of date of discharge:  _________)      Medical POA: ?  []   Yes ______________________________ ?  []   No                                       (Name and phone number)                     Living Will:   ?   []   Yes    ?  []   No        Insurance Information:    _______________________ (free text)      Individual Responsible  for the coordination of the discharge/follow up:    ______________________________________________________    Functional Status:    VISUAL DEFICITS:    Yes [x]  No  []       If yes, assisted device:   Wears Glasses Yes [x]  No  []  Wears Contacts  Yes []  No  []  Legally Blind Yes []  No  []    HEARING DEFICITS:    Yes []  No  [x]       If yes, assisted device:   Wears Hearing Aids Yes []  No  []  Pocket Talker  Yes []  No  []       Physical Therapist & Contact #:  OccupationalTherapist & Contact #: Manav Escoto OTR/L  [phone]  Speech Therapist & Contact #: Suzanne Roegrs M.A. CCC-SLP, [phone]       Activities of Daily Living:     ADL's - Adaptive

## 2024-03-07 NOTE — PROGRESS NOTES
[facility] - Inpatient Rehabilitation Department   Phone: [phone]    Speech Therapy    [] Initial Evaluation            [x] Daily Treatment Note         [x] Discharge Summary      Patient: Rissa Becerra   : 1950   MRN: [id number]   Date of Service:  3/7/2024  Admitting Diagnosis: Acute cerebrovascular accident (CVA) due to ischemia (HCC)  Current Admission Summary: Rissa Becerra is a 73 y.o. female with history of DM 2, CAD, chronic combined CHF, HTN, recent diagnosis of stroke at [facility] a few days came to ER with complaints of worsening L sided weakness and falls.  Patient has slurred speech and L facial droop at [facility].  She was discharged to home with home care.  Family is very supportive.  Noted she had progressing weakness and falls.  Today, noted to have flaccid LUE/LLE.  Brought to ER for evaluation.  No CP, SOB, HA or dizziness.  Patient is awake and answering questions.  , daughters and son are at bedside with her today.  Otherwise complete ROS is negative unless listed above.   Past Medical History:  has a past medical history of Arthropathy, Asthma, Bell's palsy, Bronchitis, Diabetes mellitus (HCC), Diverticulitis, Hyperlipidemia, Hypertension, Migraine, Polyneuropathy in diabetes ([facility]), and Vertigo.  Past Surgical History:  has a past surgical history that includes hernia repair; Carpal tunnel release; Total knee arthroplasty; Rotator cuff repair; and Percutaneous Transluminal Coronary Angio (2023).  Recent Chest xray: Decreased perihilar airspace disease on the right   Recent MRI Brain: 1. Scattered areas of acute infarct within the right MCA territory.  No  significant mass effect or midline shift.  2. Otherwise, no acute intracranial abnormality.  3. Mild to moderate global parenchymal volume loss with mild chronic  microvascular ischemic changes.  Instrumental Swallow Study: Modified Barium Swallow evaluation completed on 2024.Patient

## 2024-03-07 NOTE — CARE COORDINATION
Patient scheduled to discharge to [facility] on 3/08/2024.   (CM) referred patient to [facility] to schedule transportation. Transport as follows:    The S Vehicle you requested for Rissa LONGORIA in unit/room [id number] on 03/08/2024 is scheduled to arrive at 12:00pm EST! [facility] is handling this ride and you can contact them at [phone].    CM informed the patient and patient's family.    CM will continue to follow for support and discharge planning.    Electronically signed by ERI BRYSON on 3/7/2024 at 10:32 AM

## 2024-03-07 NOTE — PROGRESS NOTES
Rissa Becerra  3/7/2024  [id number]    Chief Complaint: Acute cerebrovascular accident (CVA) due to ischemia (HCC)    Subjective:   Patient reports that she is doing well today. Headaches are well controlled. She continues to report neuropathic pain involving her left side. She also reports her left lower leg feels \"jumpy\". She also complains of dry eyes.     Last BM: Stool Occurrence: 1 (03/05/24 1801)  PRNs administered past 24h: None     Objective:  Patient Vitals for the past 24 hrs:   BP Temp Temp src Pulse Resp SpO2   03/07/24 0225 -- -- -- -- -- 93 %   03/07/24 0135 -- -- -- -- -- 95 %   03/07/24 0130 -- -- -- -- -- (!) 89 %   03/06/24 2237 107/70 98 °F (36.7 °C) Oral 64 16 94 %   03/06/24 1857 112/66 -- -- 72 -- --   03/06/24 1815 100/63 -- -- 71 -- --     Gen: No distress, pleasant.   HEENT: Normocephalic, atraumatic.   CV: Regular rate and rhythm. Extremities warm, well perfused.   Resp: No respiratory distress. CTAB.  Abd: Soft, nontender.  Ext: Left lower leg in splint with ACE wrap.  Neuro: Alert, oriented, appropriately interactive. Left upper extremity 0/5. Left lower extremity 1/5 in hip extension, hip adduction. MAS 1+ LUE elbow flexors, wrist flexors.     Laboratory data: Available via EMR.     Therapy progress:       PT    Supine to Sit: Partial/moderate assistance  Sit to Supine: Substantial/maximal assistance   Sit to Stand: Dependent  Chair/Bed to Chair Transfer: Dependent  Car Transfer:    Ambulation 10 ft:    Ambulation 50 ft:    Ambulation 150 ft:    Stairs - 1 Step:    Stairs - 4 Step:    Stairs - 12 Step:      OT    Eating: Substantial/maximal assistance  Oral Hygiene: Partial/moderate assistance  Bathing: Dependent  Upper Body Dressing: Substantial/maximal assistance  Lower Body Dressing: Dependent  Toilet Transfer: Dependent  Toilet Hygiene: Dependent    Speech Therapy    Pt continues with mild/moderate cognitive decline characterized by deficits in thought organization, attention,

## 2024-03-07 NOTE — PLAN OF CARE
Problem: Discharge Planning  Goal: Discharge to home or other facility with appropriate resources  Outcome: Progressing  Flowsheets (Taken 3/6/2024 2254 by Mary Lemus, RN)  Discharge to home or other facility with appropriate resources: Identify discharge learning needs (meds, wound care, etc)     Problem: Safety - Adult  Goal: Free from fall injury  3/7/2024 1011 by Tomi العلي RN  Outcome: Progressing     Problem: Skin/Tissue Integrity  Goal: Absence of new skin breakdown  Description: 1.  Monitor for areas of redness and/or skin breakdown  2.  Assess vascular access sites hourly  3.  Every 4-6 hours minimum:  Change oxygen saturation probe site  4.  Every 4-6 hours:  If on nasal continuous positive airway pressure, respiratory therapy assess nares and determine need for appliance change or resting period.  3/7/2024 1011 by Tomi العلي RN  Outcome: Progressing     Problem: ABCDS Injury Assessment  Goal: Absence of physical injury  3/7/2024 1011 by Tomi العلي RN  Outcome: Progressing     Problem: Pain  Goal: Verbalizes/displays adequate comfort level or baseline comfort level  3/7/2024 1011 by Tomi العلي RN  Outcome: Progressing     Problem: Nutrition Deficit:  Goal: Optimize nutritional status  Outcome: Progressing     Problem: Chronic Conditions and Co-morbidities  Goal: Patient's chronic conditions and co-morbidity symptoms are monitored and maintained or improved  3/7/2024 1011 by Tomi العلي RN  Outcome: Progressing     Problem: Respiratory - Adult  Goal: Achieves optimal ventilation and oxygenation  3/7/2024 1011 by Tomi العلي RN  Outcome: Progressing

## 2024-03-07 NOTE — CARE COORDINATION
(DOUG) spoke with Lanny ([phone]) Admission Coordinator at [facility].  Lanny requesting patient's Social Security #.  CM informed Lanny of patient's discharge date and time.    CM will continue to follow for support and discharge planning.    Electronically signed by ERI BRYSON on 3/7/2024 at 2:17 PM

## 2024-03-07 NOTE — PROGRESS NOTES
in multi-stim environments; continues to progress with ability to attend to L with TC/VC   Orientation:    oriented to person, oriented to place, and oriented to situation  Command Following:   accurately follows one step commands     Education  Barriers To Learning: cognition, language, and visual  Patient Education: patient educated on goals, OT role and benefits, plan of care, precautions, ADL adaptive strategies, weight-bearing education, family education, disease specific education, transfer training, discharge recommendations  Learning Assessment:  patient verbalizes understanding, would benefit from continued reinforcement    Assessment  Activity Tolerance:  pt tolerated session well  Impairments Requiring Therapeutic Intervention: decreased functional mobility, decreased ADL status, decreased ROM, decreased strength, decreased safety awareness, decreased cognition, decreased endurance, decreased sensation, decreased balance, decreased vision, decreased IADL, decreased fine motor control, decreased coordination, vestibular impairment, decreased posture  Prognosis: guarded  Clinical Assessment: The patient is a 73 y.o. female who presents at [facility] below their baseline level of function due to above deficits following a fall d/t worsening L sided weakness and deficits. Per MRI, pt w/ a R MCA territory infarct  Now, pt is NWB d/t L ankle fracture. Typically, pt is mod I w/ SPC for mobility, transfers, and ADLs.     Pt continues to progress slowly- improvements most noted in trunk stability/balance, endurance, and visual attention to L UE and visual field. Pt requires assistance of 2 for functional transfer with slideboard or use of david lift systems. Pt is able to propel w/c with Nina and increased time. UB dressing/bathing has improved to person extensive assist with variable carryover of keira-techniques noted. 2 person assist is required for LB tasks with dressing/toileting primarily completed bed level at this

## 2024-03-07 NOTE — PROGRESS NOTES
Patient refusing Bipap has not wore since 3/2 since intial start. Machine removed from room at this time

## 2024-03-07 NOTE — PLAN OF CARE
Problem: Discharge Planning  Goal: Discharge to home or other facility with appropriate resources  Recent Flowsheet Documentation  Taken 3/6/2024 2254 by Mary Lemus RN  Discharge to home or other facility with appropriate resources: Identify discharge learning needs (meds, wound care, etc)  3/6/2024 1438 by Maryuri Knapp RN  Outcome: Progressing  Flowsheets (Taken 3/6/2024 1438)  Discharge to home or other facility with appropriate resources:   Identify barriers to discharge with patient and caregiver   Identify discharge learning needs (meds, wound care, etc)   Refer to discharge planning if patient needs post-hospital services based on physician order or complex needs related to functional status, cognitive ability or social support system   Arrange for needed discharge resources and transportation as appropriate     Problem: Safety - Adult  Goal: Free from fall injury  3/7/2024 0207 by Mary Lemus RN  Outcome: Progressing  3/6/2024 1438 by Maryuri Knapp RN  Outcome: Progressing  Flowsheets (Taken 3/6/2024 1438)  Free From Fall Injury: Instruct family/caregiver on patient safety     Problem: Skin/Tissue Integrity  Goal: Absence of new skin breakdown  Description: 1.  Monitor for areas of redness and/or skin breakdown  2.  Assess vascular access sites hourly  3.  Every 4-6 hours minimum:  Change oxygen saturation probe site  4.  Every 4-6 hours:  If on nasal continuous positive airway pressure, respiratory therapy assess nares and determine need for appliance change or resting period.  3/7/2024 0207 by Mary Lemus RN  Outcome: Progressing  3/6/2024 1438 by Maryuri Knapp RN  Outcome: Progressing     Problem: ABCDS Injury Assessment  Goal: Absence of physical injury  3/7/2024 0207 by Mary Lemus RN  Outcome: Progressing  Flowsheets (Taken 3/7/2024 0205)  Absence of Physical Injury: Implement safety measures based on patient assessment  3/6/2024 1438 by Maryuri Knapp

## 2024-03-07 NOTE — CARE COORDINATION
03/07/24 1525   IMM Letter   IMM Letter given to Patient/Family/Significant other/Guardian/POA/by: 2nd IMM Letter given to patient/spouse by SKY URENA   IMM Letter date given: 03/07/24   IMM Letter time given: 0918

## 2024-03-08 VITALS
SYSTOLIC BLOOD PRESSURE: 115 MMHG | BODY MASS INDEX: 34.27 KG/M2 | TEMPERATURE: 97.9 F | DIASTOLIC BLOOD PRESSURE: 63 MMHG | HEIGHT: 62 IN | HEART RATE: 69 BPM | RESPIRATION RATE: 18 BRPM | WEIGHT: 186.2 LBS | OXYGEN SATURATION: 94 %

## 2024-03-08 LAB — PERFORMED ON: ABNORMAL

## 2024-03-08 PROCEDURE — 6370000000 HC RX 637 (ALT 250 FOR IP)

## 2024-03-08 PROCEDURE — 51798 US URINE CAPACITY MEASURE: CPT

## 2024-03-08 PROCEDURE — 6360000002 HC RX W HCPCS: Performed by: STUDENT IN AN ORGANIZED HEALTH CARE EDUCATION/TRAINING PROGRAM

## 2024-03-08 PROCEDURE — 6370000000 HC RX 637 (ALT 250 FOR IP): Performed by: STUDENT IN AN ORGANIZED HEALTH CARE EDUCATION/TRAINING PROGRAM

## 2024-03-08 RX ORDER — BETHANECHOL CHLORIDE 50 MG/1
50 TABLET ORAL 3 TIMES DAILY
Qty: 90 TABLET | Refills: 3
Start: 2024-03-08

## 2024-03-08 RX ORDER — TAMSULOSIN HYDROCHLORIDE 0.4 MG/1
0.4 CAPSULE ORAL
Qty: 30 CAPSULE | Refills: 3
Start: 2024-03-08

## 2024-03-08 RX ORDER — INSULIN GLARGINE 100 [IU]/ML
38 INJECTION, SOLUTION SUBCUTANEOUS
Qty: 10 ML | Refills: 3
Start: 2024-03-08

## 2024-03-08 RX ORDER — FUROSEMIDE 20 MG/1
20 TABLET ORAL DAILY
Qty: 60 TABLET | Refills: 3
Start: 2024-03-08

## 2024-03-08 RX ORDER — INSULIN LISPRO 100 [IU]/ML
0-8 INJECTION, SOLUTION INTRAVENOUS; SUBCUTANEOUS
Qty: 30 EACH | Refills: 0
Start: 2024-03-08

## 2024-03-08 RX ORDER — DULOXETIN HYDROCHLORIDE 60 MG/1
60 CAPSULE, DELAYED RELEASE ORAL DAILY
Qty: 30 CAPSULE | Refills: 3
Start: 2024-03-08

## 2024-03-08 RX ORDER — CARBOXYMETHYLCELLULOSE SODIUM 10 MG/ML
1 GEL OPHTHALMIC PRN
Qty: 30 EACH | Refills: 0
Start: 2024-03-08

## 2024-03-08 RX ORDER — NORTRIPTYLINE HYDROCHLORIDE 10 MG/1
10 CAPSULE ORAL NIGHTLY
Qty: 30 CAPSULE | Refills: 3
Start: 2024-03-08

## 2024-03-08 RX ORDER — LIDOCAINE HYDROCHLORIDE 20 MG/ML
JELLY TOPICAL PRN
Qty: 30 EACH | Refills: 0
Start: 2024-03-08

## 2024-03-08 RX ORDER — NITROFURANTOIN 25; 75 MG/1; MG/1
100 CAPSULE ORAL EVERY 12 HOURS SCHEDULED
Qty: 2 CAPSULE | Refills: 0
Start: 2024-03-08 | End: 2024-03-09

## 2024-03-08 RX ORDER — NYSTATIN 100000 U/G
OINTMENT TOPICAL
Qty: 1 EACH | Refills: 0
Start: 2024-03-08

## 2024-03-08 RX ORDER — ENOXAPARIN SODIUM 100 MG/ML
40 INJECTION SUBCUTANEOUS DAILY
Qty: 30 EACH | Refills: 0
Start: 2024-03-08

## 2024-03-08 RX ORDER — POLYETHYLENE GLYCOL 3350 17 G/17G
17 POWDER, FOR SOLUTION ORAL DAILY PRN
Qty: 30 PACKET | Refills: 0
Start: 2024-03-08 | End: 2024-04-07

## 2024-03-08 RX ORDER — INSULIN LISPRO 100 [IU]/ML
0-4 INJECTION, SOLUTION INTRAVENOUS; SUBCUTANEOUS NIGHTLY
Qty: 30 EACH | Refills: 0
Start: 2024-03-08

## 2024-03-08 RX ADMIN — DULOXETINE HYDROCHLORIDE 60 MG: 60 CAPSULE, DELAYED RELEASE ORAL at 08:52

## 2024-03-08 RX ADMIN — BETHANECHOL CHLORIDE 50 MG: 25 TABLET ORAL at 08:53

## 2024-03-08 RX ADMIN — FUROSEMIDE 20 MG: 20 TABLET ORAL at 09:00

## 2024-03-08 RX ADMIN — CARVEDILOL 12.5 MG: 6.25 TABLET, FILM COATED ORAL at 08:52

## 2024-03-08 RX ADMIN — Medication 2 CAPSULE: at 08:53

## 2024-03-08 RX ADMIN — ASPIRIN 81 MG: 81 TABLET, COATED ORAL at 08:52

## 2024-03-08 RX ADMIN — CETIRIZINE HYDROCHLORIDE 10 MG: 10 TABLET, FILM COATED ORAL at 08:53

## 2024-03-08 RX ADMIN — NITROFURANTOIN MONOHYDRATE/MACROCRYSTALS 100 MG: 75; 25 CAPSULE ORAL at 08:53

## 2024-03-08 RX ADMIN — CLOPIDOGREL 75 MG: 75 TABLET, FILM COATED ORAL at 08:53

## 2024-03-08 RX ADMIN — Medication 1 TABLET: at 08:52

## 2024-03-08 RX ADMIN — CHOLECALCIFEROL TAB 25 MCG (1000 UNIT) 1000 UNITS: 25 TAB at 08:53

## 2024-03-08 RX ADMIN — SACUBITRIL AND VALSARTAN 1 TABLET: 24; 26 TABLET, FILM COATED ORAL at 08:53

## 2024-03-08 RX ADMIN — METFORMIN HYDROCHLORIDE 1000 MG: 500 TABLET ORAL at 08:52

## 2024-03-08 RX ADMIN — ENOXAPARIN SODIUM 40 MG: 100 INJECTION SUBCUTANEOUS at 08:56

## 2024-03-08 RX ADMIN — INSULIN GLARGINE 38 UNITS: 100 INJECTION, SOLUTION SUBCUTANEOUS at 08:53

## 2024-03-08 RX ADMIN — NYSTATIN OINTMENT: 100000 OINTMENT TOPICAL at 08:56

## 2024-03-08 ASSESSMENT — PAIN SCALES - GENERAL: PAINLEVEL_OUTOF10: 4

## 2024-03-08 ASSESSMENT — PAIN DESCRIPTION - LOCATION: LOCATION: BACK

## 2024-03-08 NOTE — PLAN OF CARE
Problem: Discharge Planning  Goal: Discharge to home or other facility with appropriate resources  Outcome: Progressing  Flowsheets (Taken 3/7/2024 2216)  Discharge to home or other facility with appropriate resources: Identify discharge learning needs (meds, wound care, etc)     Problem: Safety - Adult  Goal: Free from fall injury  Outcome: Progressing     Problem: Skin/Tissue Integrity  Goal: Absence of new skin breakdown  Description: 1.  Monitor for areas of redness and/or skin breakdown  2.  Assess vascular access sites hourly  3.  Every 4-6 hours minimum:  Change oxygen saturation probe site  4.  Every 4-6 hours:  If on nasal continuous positive airway pressure, respiratory therapy assess nares and determine need for appliance change or resting period.  Outcome: Progressing     Problem: ABCDS Injury Assessment  Goal: Absence of physical injury  Outcome: Progressing  Flowsheets (Taken 3/8/2024 0313)  Absence of Physical Injury: Implement safety measures based on patient assessment     Problem: Pain  Goal: Verbalizes/displays adequate comfort level or baseline comfort level  Outcome: Progressing     Problem: Chronic Conditions and Co-morbidities  Goal: Patient's chronic conditions and co-morbidity symptoms are monitored and maintained or improved  Outcome: Progressing  Flowsheets (Taken 3/7/2024 2216)  Care Plan - Patient's Chronic Conditions and Co-Morbidity Symptoms are Monitored and Maintained or Improved:   Monitor and assess patient's chronic conditions and comorbid symptoms for stability, deterioration, or improvement   Collaborate with multidisciplinary team to address chronic and comorbid conditions and prevent exacerbation or deterioration   Update acute care plan with appropriate goals if chronic or comorbid symptoms are exacerbated and prevent overall improvement and discharge     Problem: Respiratory - Adult  Goal: Achieves optimal ventilation and oxygenation  Outcome: Progressing  Flowsheets

## 2024-03-08 NOTE — PLAN OF CARE
Problem: Discharge Planning  Goal: Discharge to home or other facility with appropriate resources  3/8/2024 1018 by Dilip Pope, RN  Outcome: Progressing     Problem: Safety - Adult  Goal: Free from fall injury  3/8/2024 1018 by Dilip Pope, RN  Outcome: Progressing     Problem: Skin/Tissue Integrity  Goal: Absence of new skin breakdown  Description: 1.  Monitor for areas of redness and/or skin breakdown  2.  Assess vascular access sites hourly  3.  Every 4-6 hours minimum:  Change oxygen saturation probe site  4.  Every 4-6 hours:  If on nasal continuous positive airway pressure, respiratory therapy assess nares and determine need for appliance change or resting period.  3/8/2024 0315 by Mary Lemus, RN  Outcome: Progressing     Problem: Pain  Goal: Verbalizes/displays adequate comfort level or baseline comfort level  3/8/2024 1018 by Dilip Pope, RN  Outcome: Progressing

## 2024-03-08 NOTE — CARE COORDINATION
Patient noted to have a discharge order.  Pt has been medically cleared for transition to Skilled Nursing Rehab Facility    Patient discharged to   [facility]  [address] [address]  Phone: [phone]  Fax: [phone]          HENS Completed:  Yes ID#: [id number]  Pre-cert required/obtained:  Yes    Transportation scheduled for 12 noon  Transportation provided by [facility]  AVS faxed and agency notified:  Yes  The following prescriptions sent with pt: None  Family Notified:  Yes.  Spouse and daughter aware.    Nurse to call report to facility is Dilip.    Electronically signed by ERI BRYSON on 3/8/2024 at 11:36 AM

## 2024-03-08 NOTE — PROGRESS NOTES
flowsheet at discharge prior to proceeding with Delirium Assessment**  [x] BIMS completed in flowsheet at discharge  [] BIMS not completed due to patient unable to give appropriate related answers, see Staff Assessment in flowsheet    Signs and Symptoms of Delirium  A.  Acute Onset Mental Status Change - Is there evidence of an acute change in mental status from the patient's baseline?  [x] 0.  No  [] 1.  Yes    B.  Inattention - Did the patient have difficulty focusing attention, for example being easily distractible or having difficulty keeping track of what was being said?  [x]  0.  Behavior not present  []  1.  Behavior continuously present, does not fluctuate  []  2.  Behavior present, fluctuates (comes and goes, changes in severity)    C.  Disorganized thinking - Was the patient's thinking disorganized or incoherent (rambling or irrelevant conversation, unclear or illogical flow of ideas, or unpredictable switching from subject to subject)?  [x]  0.  Behavior not present  []  1.  Behavior continuously present, does not fluctuate  []  2.  Behavior present, fluctuates (comes and goes, changes in severity)    D.  Altered level of consciousness - Did the patient have altered level of consciousness as indicated by any of the following criteria?  Vigilant - startled easily to any sound or touch  Lethargic - repeatedly dozed off while being asked questions, but responded to voice or touch  Stuporous - very difficulty to arouse and keep aroused for the interview  Comatose - could not be aroused  [x]  0.  Behavior not present  []  1.  Behavior continuously present, does not fluctuate  []  2.  Behavior present, fluctuates (comes and goes, changes in severity)    Mood    \"Over the last 2 weeks, have you been bothered by any of the following problems?\" 1. Symptom Presence    0 = No  1 = Yes  9 = No Response 2. Symptom Frequency    0 = Never or 1 day  1 = 2-6 days (several days)  2 = 7-11 days (half or more of the days)  3

## 2024-03-08 NOTE — DISCHARGE SUMMARY
Physical Medicine & Rehabilitation  Discharge Summary     Patient Identification:  Rissa Becerra  : 1950  Admit date: 2024  Discharge date: 3/8/2024  Attending provider: No att. providers found        Primary care provider: Stephanie Oneill MD     Discharge Diagnoses:   Patient Active Problem List   Diagnosis    Localized edema    Diabetic polyneuropathy (HCC)    Acute CVA (cerebrovascular accident) (HCC)    Left hemiplegia (HCC)    Dysphagia    CAD (coronary artery disease)    DM2 (diabetes mellitus, type 2) (HCC)    HTN (hypertension), benign    Obesity (BMI 30-39.9)    Acute left-sided weakness    Arterial ischemic stroke, ICA, right, acute (HCC)    DM (diabetes mellitus), secondary, with complications ([facility])    Dyslipidemia    Closed displaced fracture of lateral malleolus of left fibula    Acute cerebrovascular accident (CVA) due to ischemia ([facility])       Discharge Functional Status:      Physical therapy:  Supine to Sit: Partial/moderate assistance  Sit to Supine: Substantial/maximal assistance      Sit to Stand: Dependent  Chair/Bed to Chair Transfer: Dependent  Car Transfer:       Ambulation 10 ft:    Ambulation 50 ft:    Ambulation 150 ft:    Stairs - 1 Step:    Stairs - 4 Step:    Stairs - 12 Step:      Occupational therapy:  Eating: Supervision or touching assistance  Oral Hygiene: Partial/moderate assistance  Bathing: Substantial/maximal assistance  Upper Body Dressing: Substantial/maximal assistance  Lower Body Dressing: Dependent     Toilet Transfer: Dependent  Toilet Hygiene: Dependent    Speech therapy:    Pt continues with mild/moderate cognitive decline characterized by deficits in thought organization, attention, memory, and problem solving in addition to moderate to severe left visual neglect. Pt making improvements with her speech with improved labial coordination/ breath support improving her overall intelligibility within structured conversation. Pt's goals are to continue to work

## 2024-03-08 NOTE — PROGRESS NOTES
Patient called, requesting to be repositioned. Upon entry to room, patient stated that she thought she had been incontinent of urine. Diaper wet. Cleaned, changed, repositioned. Bladder scanned post-void for 180 mL.    2300  Assessment complete. Alert, oriented x4. Reports generalized soreness and aching. Trouble getting comfortable, even with frequent repositioning this shift. Given PRN acetaminophen for pain control. Biofreeze applied to left back and hip per patient request to aid in pain relief. The care plan and education has been reviewed and mutually agreed upon with the patient. In bed, alarm on, bed in lowest position, call light and table within reach. No further needs expressed at this time.    0245  Incontinent of bladder and bowel (smear) while sleeping. Bladder scanned post-void for 216 mL. SpO2 ranging 89% to 93% on room air while sleeping. Supplemental O2 initiated at 1L/minute per nasal cannula; SpO2 remains greater than 90%.

## 2024-03-08 NOTE — PROGRESS NOTES
Pt comfortably resting in bed. VSS. Morning meds given per MAR. PWW applesauce. AXOX4. Call light and bedside table in reach. Bed in the lowest position, locked and alarm on. The care plan and education has been reviewed and mutually agreed upon with the patient.

## 2024-03-08 NOTE — PROGRESS NOTES
Called marek arvin @ [phone]. Couldn't get ahold of the nurse. Will attempt to contact later again.    1235: report given to the transport team. Couldn't find the DNR hardcopy.  notified.     1308: DNR hardcopy printed and Dr. Greene signed it. Copy of the paper given to the transport team.     1315: pt left the unit with the transport.

## 2024-03-18 ENCOUNTER — OFFICE VISIT (OUTPATIENT)
Dept: ORTHOPEDIC SURGERY | Age: 74
End: 2024-03-18
Payer: MEDICARE

## 2024-03-18 VITALS — BODY MASS INDEX: 34.23 KG/M2 | WEIGHT: 186 LBS | HEIGHT: 62 IN

## 2024-03-18 DIAGNOSIS — S82.62XA CLOSED DISPLACED FRACTURE OF LATERAL MALLEOLUS OF LEFT FIBULA, INITIAL ENCOUNTER: ICD-10-CM

## 2024-03-18 DIAGNOSIS — S99.912A INJURY OF LEFT ANKLE, INITIAL ENCOUNTER: Primary | ICD-10-CM

## 2024-03-18 PROCEDURE — 3017F COLORECTAL CA SCREEN DOC REV: CPT | Performed by: STUDENT IN AN ORGANIZED HEALTH CARE EDUCATION/TRAINING PROGRAM

## 2024-03-18 PROCEDURE — G8417 CALC BMI ABV UP PARAM F/U: HCPCS | Performed by: STUDENT IN AN ORGANIZED HEALTH CARE EDUCATION/TRAINING PROGRAM

## 2024-03-18 PROCEDURE — 1123F ACP DISCUSS/DSCN MKR DOCD: CPT | Performed by: STUDENT IN AN ORGANIZED HEALTH CARE EDUCATION/TRAINING PROGRAM

## 2024-03-18 PROCEDURE — 1036F TOBACCO NON-USER: CPT | Performed by: STUDENT IN AN ORGANIZED HEALTH CARE EDUCATION/TRAINING PROGRAM

## 2024-03-18 PROCEDURE — G8484 FLU IMMUNIZE NO ADMIN: HCPCS | Performed by: STUDENT IN AN ORGANIZED HEALTH CARE EDUCATION/TRAINING PROGRAM

## 2024-03-18 PROCEDURE — L4361 PNEUMA/VAC WALK BOOT PRE OTS: HCPCS | Performed by: STUDENT IN AN ORGANIZED HEALTH CARE EDUCATION/TRAINING PROGRAM

## 2024-03-18 PROCEDURE — G8400 PT W/DXA NO RESULTS DOC: HCPCS | Performed by: STUDENT IN AN ORGANIZED HEALTH CARE EDUCATION/TRAINING PROGRAM

## 2024-03-18 PROCEDURE — 1111F DSCHRG MED/CURRENT MED MERGE: CPT | Performed by: STUDENT IN AN ORGANIZED HEALTH CARE EDUCATION/TRAINING PROGRAM

## 2024-03-18 PROCEDURE — G8427 DOCREV CUR MEDS BY ELIG CLIN: HCPCS | Performed by: STUDENT IN AN ORGANIZED HEALTH CARE EDUCATION/TRAINING PROGRAM

## 2024-03-18 PROCEDURE — 1090F PRES/ABSN URINE INCON ASSESS: CPT | Performed by: STUDENT IN AN ORGANIZED HEALTH CARE EDUCATION/TRAINING PROGRAM

## 2024-03-18 PROCEDURE — 99204 OFFICE O/P NEW MOD 45 MIN: CPT | Performed by: STUDENT IN AN ORGANIZED HEALTH CARE EDUCATION/TRAINING PROGRAM

## 2024-03-18 NOTE — PROGRESS NOTES
CHIEF COMPLAINT: Left ankle pain / Wilson B distal fibular fracture.    DATE OF INJURY: 2/11/24    HISTORY:  Ms. Becerra is a 73 y.o. right handed female, who presents today for evaluation of a left ankle injury.  The patient reports that this injury occurred when she had a stroke and fell the next day. She had left sided paralysis after the stroke.  She was first seen and evaluated in [facility], when she was evaluated, splinted, and asked to follow up with Orthopaedics.  She has been in a rehab facility for the stroke. The splint has remained on for the last month. The patient denies any other injuries.  She rates pain at a level of 0/10 on an analog scale. Elevation makes the pain worse. She can't feel very much on the left side of her body.  Splint and resting makes the pain better.  No numbness or tingling sensation.        Past Medical History:   Diagnosis Date    Arthropathy     Asthma     Bell's palsy     on right side    Bronchitis     Diabetes mellitus (HCC)     Diverticulitis     Hyperlipidemia     Hypertension     Migraine     Polyneuropathy in diabetes (HCC)     Vertigo        Past Surgical History:   Procedure Laterality Date    CARPAL TUNNEL RELEASE      HERNIA REPAIR      PTCA  05/2023    ROTATOR CUFF REPAIR      right shoulder    TOTAL KNEE ARTHROPLASTY      right and left        Current Outpatient Medications   Medication Sig Dispense Refill    enoxaparin (LOVENOX) 40 MG/0.4ML Inject 0.4 mLs into the skin daily 30 each 0    DULoxetine (CYMBALTA) 60 MG extended release capsule Take 1 capsule by mouth daily 30 capsule 3    nortriptyline (PAMELOR) 10 MG capsule Take 1 capsule by mouth nightly 30 capsule 3    insulin glargine (LANTUS) 100 UNIT/ML injection vial Inject 38 Units into the skin daily (with breakfast) 10 mL 3    insulin lispro (HUMALOG) 100 UNIT/ML SOLN injection vial Inject 0-8 Units into the skin 3 times daily (with meals) 30 each 0    insulin lispro (HUMALOG) 100 UNIT/ML

## 2024-04-01 ENCOUNTER — TELEPHONE (OUTPATIENT)
Dept: ORTHOPEDIC SURGERY | Age: 74
End: 2024-04-01

## 2024-04-01 NOTE — TELEPHONE ENCOUNTER
General Question     Subject: APPT REQUEST  Patient and /or Facility Request: Rissa Becerra   Contact Number:[phone]     PATIENTS DAUGHTER DARRELL CALLING REGARDING PATIENT NEEDS A APPT SCD AT  LOCATION FOR 4/10/24 AFTER 12PM  DUE TO FACILITY TRANSPORTATION SCD     DUE TO THIS ISSUE PATIENTS DAUGHTER WOULD LIKE TO KNOW IF THE PATIENT CAN BE SEEN BY ANY OTHER DR OR PHYSICIAN ASSISTANT ?    DAUGHTER IS AWARE THAT DR GUERRERO / VIRGEN WILL BE IN AT [facility] ON WEDS    DAUGHTER WOULD LIKE TO KNOW IF THE FACILITY COULD DO XR , IF THEY COULD SEND XR TO DR. GUERRERO OFFICE     PATIENT WAS SEEN 2/12/24-2/16/24 IN [facility] , PATIENT WAS TO FOLLOW UP WITH DR. RÍOS PER ER NOTES     PLEASE CALL THE PATIENT DAUGHTER DARRELL AT THE ABOVE NUMBER

## 2024-04-02 NOTE — TELEPHONE ENCOUNTER
S/W DARRELL  Per LKS, she may be seen by another provider at the [facility] office. Transferred to schedule.

## 2024-04-10 SDOH — HEALTH STABILITY: PHYSICAL HEALTH: ON AVERAGE, HOW MANY DAYS PER WEEK DO YOU ENGAGE IN MODERATE TO STRENUOUS EXERCISE (LIKE A BRISK WALK)?: 0 DAYS

## 2024-04-12 ENCOUNTER — OFFICE VISIT (OUTPATIENT)
Dept: ORTHOPEDIC SURGERY | Age: 74
End: 2024-04-12

## 2024-04-12 VITALS — RESPIRATION RATE: 16 BRPM | BODY MASS INDEX: 34.23 KG/M2 | HEIGHT: 62 IN | WEIGHT: 186 LBS

## 2024-04-12 DIAGNOSIS — M21.372 ACQUIRED LEFT FOOT DROP: ICD-10-CM

## 2024-04-12 DIAGNOSIS — S82.62XD CLOSED DISPLACED FRACTURE OF LATERAL MALLEOLUS OF LEFT FIBULA WITH ROUTINE HEALING, SUBSEQUENT ENCOUNTER: Primary | ICD-10-CM

## 2024-04-12 DIAGNOSIS — M21.332 LEFT WRIST DROP: ICD-10-CM

## 2024-04-12 DIAGNOSIS — I69.954 FLACCID HEMIPLEGIA OF LEFT NONDOMINANT SIDE AS LATE EFFECT OF CEREBROVASCULAR DISEASE, UNSPECIFIED CEREBROVASCULAR DISEASE TYPE (HCC): ICD-10-CM

## 2024-04-12 NOTE — PROGRESS NOTES
ORTHOPAEDIC NEW PATIENT NOTE    Chief Complaint   Patient presents with    Ankle Injury     Left ankle injury  2-11-24         HPI  4/12/24  73 y.o. female seen for evaluation and follow-up of left ankle fracture  She previously saw Jeana Sportsman  She is 2 months post injury (DOI was 2/11/24)  She reports improvement of her left ankle  She is using a boot  She gets up with a therapist  She is currently at [facility] facility  Denies numbness and tingling  She has no motor function in her left upper extremity and left lower extremity secondary to the stroke which precipitated her fall        Allergies   Allergen Reactions    Amoxicillin Other (See Comments)     Yeast infection    Erythromycin Other (See Comments)     Yeast infection    Gabapentin Rash        Current Outpatient Medications   Medication Sig Dispense Refill    enoxaparin (LOVENOX) 40 MG/0.4ML Inject 0.4 mLs into the skin daily 30 each 0    DULoxetine (CYMBALTA) 60 MG extended release capsule Take 1 capsule by mouth daily 30 capsule 3    nortriptyline (PAMELOR) 10 MG capsule Take 1 capsule by mouth nightly 30 capsule 3    insulin glargine (LANTUS) 100 UNIT/ML injection vial Inject 38 Units into the skin daily (with breakfast) 10 mL 3    insulin lispro (HUMALOG) 100 UNIT/ML SOLN injection vial Inject 0-8 Units into the skin 3 times daily (with meals) 30 each 0    insulin lispro (HUMALOG) 100 UNIT/ML SOLN injection vial Inject 0-4 Units into the skin nightly 30 each 0    lidocaine (XYLOCAINE) 2 % uro-jet Apply topically as needed for Pain (with intermittent cath) 30 each 0    furosemide (LASIX) 20 MG tablet Take 1 tablet by mouth daily 60 tablet 3    tamsulosin (FLOMAX) 0.4 MG capsule Take 1 capsule by mouth nightly 30 capsule 3    carboxymethylcellulose PF (THERATEARS/REFRESH) 1 % GEL ophthalmic gel Place 1 drop into both eyes as needed for Dry Eyes 30 each 0    bethanechol (URECHOLINE) 50 MG tablet Take 1 tablet by mouth 3 times daily 90 tablet 3

## 2024-07-18 ENCOUNTER — APPOINTMENT (OUTPATIENT)
Dept: CT IMAGING | Age: 74
DRG: 101 | End: 2024-07-18
Payer: MEDICARE

## 2024-07-18 ENCOUNTER — APPOINTMENT (OUTPATIENT)
Dept: MRI IMAGING | Age: 74
DRG: 101 | End: 2024-07-18
Payer: MEDICARE

## 2024-07-18 ENCOUNTER — HOSPITAL ENCOUNTER (INPATIENT)
Age: 74
LOS: 1 days | Discharge: HOME HEALTH | DRG: 101 | End: 2024-07-20
Attending: STUDENT IN AN ORGANIZED HEALTH CARE EDUCATION/TRAINING PROGRAM | Admitting: INTERNAL MEDICINE
Payer: MEDICARE

## 2024-07-18 DIAGNOSIS — I69.998 WEAKNESS DUE TO OLD STROKE: ICD-10-CM

## 2024-07-18 DIAGNOSIS — E83.42 HYPOMAGNESEMIA: Primary | ICD-10-CM

## 2024-07-18 DIAGNOSIS — R53.1 WEAKNESS DUE TO OLD STROKE: ICD-10-CM

## 2024-07-18 DIAGNOSIS — R53.1 RIGHT SIDED WEAKNESS: ICD-10-CM

## 2024-07-18 LAB
ALBUMIN SERPL-MCNC: 3.3 G/DL (ref 3.4–5)
ALBUMIN/GLOB SERPL: 1 {RATIO} (ref 1.1–2.2)
ALP SERPL-CCNC: 86 U/L (ref 40–129)
ALT SERPL-CCNC: 7 U/L (ref 10–40)
ANION GAP SERPL CALCULATED.3IONS-SCNC: 17 MMOL/L (ref 3–16)
AST SERPL-CCNC: 13 U/L (ref 15–37)
BASOPHILS # BLD: 0.1 K/UL (ref 0–0.2)
BASOPHILS NFR BLD: 0.7 %
BILIRUB SERPL-MCNC: 0.4 MG/DL (ref 0–1)
BUN SERPL-MCNC: 16 MG/DL (ref 7–20)
CALCIUM SERPL-MCNC: 9.1 MG/DL (ref 8.3–10.6)
CHLORIDE SERPL-SCNC: 94 MMOL/L (ref 99–110)
CO2 SERPL-SCNC: 24 MMOL/L (ref 21–32)
CREAT SERPL-MCNC: 0.5 MG/DL (ref 0.6–1.2)
DEPRECATED RDW RBC AUTO: 17 % (ref 12.4–15.4)
EOSINOPHIL # BLD: 0.5 K/UL (ref 0–0.6)
EOSINOPHIL NFR BLD: 4.8 %
GFR SERPLBLD CREATININE-BSD FMLA CKD-EPI: >90 ML/MIN/{1.73_M2}
GLUCOSE BLD-MCNC: 82 MG/DL (ref 70–99)
GLUCOSE SERPL-MCNC: 83 MG/DL (ref 70–99)
HCT VFR BLD AUTO: 32.5 % (ref 36–48)
HGB BLD-MCNC: 11.1 G/DL (ref 12–16)
INR PPP: 1.09 (ref 0.85–1.15)
LYMPHOCYTES # BLD: 2.5 K/UL (ref 1–5.1)
LYMPHOCYTES NFR BLD: 25.6 %
MAGNESIUM SERPL-MCNC: 1.2 MG/DL (ref 1.8–2.4)
MCH RBC QN AUTO: 29.3 PG (ref 26–34)
MCHC RBC AUTO-ENTMCNC: 34.1 G/DL (ref 31–36)
MCV RBC AUTO: 85.9 FL (ref 80–100)
MONOCYTES # BLD: 0.9 K/UL (ref 0–1.3)
MONOCYTES NFR BLD: 9.4 %
NEUTROPHILS # BLD: 5.8 K/UL (ref 1.7–7.7)
NEUTROPHILS NFR BLD: 59.5 %
PERFORMED ON: NORMAL
PLATELET # BLD AUTO: 390 K/UL (ref 135–450)
PMV BLD AUTO: 6.5 FL (ref 5–10.5)
POTASSIUM SERPL-SCNC: 3.7 MMOL/L (ref 3.5–5.1)
PROT SERPL-MCNC: 6.7 G/DL (ref 6.4–8.2)
PROTHROMBIN TIME: 14.3 SEC (ref 11.9–14.9)
RBC # BLD AUTO: 3.79 M/UL (ref 4–5.2)
SODIUM SERPL-SCNC: 135 MMOL/L (ref 136–145)
TROPONIN, HIGH SENSITIVITY: 37 NG/L (ref 0–14)
WBC # BLD AUTO: 9.8 K/UL (ref 4–11)

## 2024-07-18 PROCEDURE — 70498 CT ANGIOGRAPHY NECK: CPT

## 2024-07-18 PROCEDURE — 99285 EMERGENCY DEPT VISIT HI MDM: CPT

## 2024-07-18 PROCEDURE — 85610 PROTHROMBIN TIME: CPT

## 2024-07-18 PROCEDURE — 6360000004 HC RX CONTRAST MEDICATION: Performed by: STUDENT IN AN ORGANIZED HEALTH CARE EDUCATION/TRAINING PROGRAM

## 2024-07-18 PROCEDURE — 85025 COMPLETE CBC W/AUTO DIFF WBC: CPT

## 2024-07-18 PROCEDURE — 70450 CT HEAD/BRAIN W/O DYE: CPT

## 2024-07-18 PROCEDURE — 70551 MRI BRAIN STEM W/O DYE: CPT

## 2024-07-18 PROCEDURE — 80053 COMPREHEN METABOLIC PANEL: CPT

## 2024-07-18 PROCEDURE — 83735 ASSAY OF MAGNESIUM: CPT

## 2024-07-18 PROCEDURE — 84484 ASSAY OF TROPONIN QUANT: CPT

## 2024-07-18 PROCEDURE — 6360000002 HC RX W HCPCS: Performed by: STUDENT IN AN ORGANIZED HEALTH CARE EDUCATION/TRAINING PROGRAM

## 2024-07-18 PROCEDURE — 93005 ELECTROCARDIOGRAM TRACING: CPT | Performed by: STUDENT IN AN ORGANIZED HEALTH CARE EDUCATION/TRAINING PROGRAM

## 2024-07-18 RX ORDER — MAGNESIUM SULFATE IN WATER 40 MG/ML
2000 INJECTION, SOLUTION INTRAVENOUS ONCE
Status: COMPLETED | OUTPATIENT
Start: 2024-07-18 | End: 2024-07-19

## 2024-07-18 RX ADMIN — IOPAMIDOL 75 ML: 755 INJECTION, SOLUTION INTRAVENOUS at 20:28

## 2024-07-18 RX ADMIN — MAGNESIUM SULFATE HEPTAHYDRATE 2000 MG: 40 INJECTION, SOLUTION INTRAVENOUS at 23:32

## 2024-07-18 ASSESSMENT — LIFESTYLE VARIABLES
HOW OFTEN DO YOU HAVE A DRINK CONTAINING ALCOHOL: NEVER
HOW MANY STANDARD DRINKS CONTAINING ALCOHOL DO YOU HAVE ON A TYPICAL DAY: PATIENT DOES NOT DRINK

## 2024-07-18 ASSESSMENT — PAIN - FUNCTIONAL ASSESSMENT: PAIN_FUNCTIONAL_ASSESSMENT: NONE - DENIES PAIN

## 2024-07-19 PROBLEM — E83.42 HYPOMAGNESEMIA: Status: ACTIVE | Noted: 2024-07-19

## 2024-07-19 PROBLEM — R56.9 SEIZURE-LIKE ACTIVITY (HCC): Status: ACTIVE | Noted: 2024-07-19

## 2024-07-19 PROBLEM — R29.90 STROKE-LIKE SYMPTOMS: Status: ACTIVE | Noted: 2024-07-19

## 2024-07-19 LAB
ANION GAP SERPL CALCULATED.3IONS-SCNC: 13 MMOL/L (ref 3–16)
BUN SERPL-MCNC: 14 MG/DL (ref 7–20)
CALCIUM SERPL-MCNC: 9 MG/DL (ref 8.3–10.6)
CHLORIDE SERPL-SCNC: 98 MMOL/L (ref 99–110)
CO2 SERPL-SCNC: 24 MMOL/L (ref 21–32)
CREAT SERPL-MCNC: <0.5 MG/DL (ref 0.6–1.2)
EKG ATRIAL RATE: 94 BPM
EKG DIAGNOSIS: NORMAL
EKG P AXIS: 51 DEGREES
EKG P-R INTERVAL: 166 MS
EKG Q-T INTERVAL: 414 MS
EKG QRS DURATION: 150 MS
EKG QTC CALCULATION (BAZETT): 517 MS
EKG R AXIS: -61 DEGREES
EKG T AXIS: 98 DEGREES
EKG VENTRICULAR RATE: 94 BPM
GFR SERPLBLD CREATININE-BSD FMLA CKD-EPI: >90 ML/MIN/{1.73_M2}
GLUCOSE BLD-MCNC: 101 MG/DL (ref 70–99)
GLUCOSE BLD-MCNC: 120 MG/DL (ref 70–99)
GLUCOSE BLD-MCNC: 138 MG/DL (ref 70–99)
GLUCOSE BLD-MCNC: 227 MG/DL (ref 70–99)
GLUCOSE BLD-MCNC: 80 MG/DL (ref 70–99)
GLUCOSE SERPL-MCNC: 96 MG/DL (ref 70–99)
MAGNESIUM SERPL-MCNC: 1.8 MG/DL (ref 1.8–2.4)
PERFORMED ON: ABNORMAL
PERFORMED ON: NORMAL
POTASSIUM SERPL-SCNC: 3.8 MMOL/L (ref 3.5–5.1)
SODIUM SERPL-SCNC: 135 MMOL/L (ref 136–145)
TROPONIN, HIGH SENSITIVITY: 43 NG/L (ref 0–14)

## 2024-07-19 PROCEDURE — 95816 EEG AWAKE AND DROWSY: CPT | Performed by: PSYCHIATRY & NEUROLOGY

## 2024-07-19 PROCEDURE — 2580000003 HC RX 258: Performed by: NURSE PRACTITIONER

## 2024-07-19 PROCEDURE — 6370000000 HC RX 637 (ALT 250 FOR IP): Performed by: NURSE PRACTITIONER

## 2024-07-19 PROCEDURE — 92526 ORAL FUNCTION THERAPY: CPT

## 2024-07-19 PROCEDURE — 6360000002 HC RX W HCPCS: Performed by: INTERNAL MEDICINE

## 2024-07-19 PROCEDURE — 93010 ELECTROCARDIOGRAM REPORT: CPT | Performed by: INTERNAL MEDICINE

## 2024-07-19 PROCEDURE — 2060000000 HC ICU INTERMEDIATE R&B

## 2024-07-19 PROCEDURE — 83735 ASSAY OF MAGNESIUM: CPT

## 2024-07-19 PROCEDURE — 95819 EEG AWAKE AND ASLEEP: CPT

## 2024-07-19 PROCEDURE — 97530 THERAPEUTIC ACTIVITIES: CPT

## 2024-07-19 PROCEDURE — 97162 PT EVAL MOD COMPLEX 30 MIN: CPT

## 2024-07-19 PROCEDURE — 36415 COLL VENOUS BLD VENIPUNCTURE: CPT

## 2024-07-19 PROCEDURE — 80048 BASIC METABOLIC PNL TOTAL CA: CPT

## 2024-07-19 PROCEDURE — APPSS60 APP SPLIT SHARED TIME 46-60 MINUTES

## 2024-07-19 PROCEDURE — 6360000002 HC RX W HCPCS: Performed by: NURSE PRACTITIONER

## 2024-07-19 PROCEDURE — 84484 ASSAY OF TROPONIN QUANT: CPT

## 2024-07-19 PROCEDURE — 97535 SELF CARE MNGMENT TRAINING: CPT

## 2024-07-19 PROCEDURE — 92523 SPEECH SOUND LANG COMPREHEN: CPT

## 2024-07-19 PROCEDURE — 92610 EVALUATE SWALLOWING FUNCTION: CPT

## 2024-07-19 PROCEDURE — APPNB30 APP NON BILLABLE TIME 0-30 MINS

## 2024-07-19 PROCEDURE — 97166 OT EVAL MOD COMPLEX 45 MIN: CPT

## 2024-07-19 RX ORDER — NORTRIPTYLINE HYDROCHLORIDE 10 MG/1
10 CAPSULE ORAL NIGHTLY
Status: DISCONTINUED | OUTPATIENT
Start: 2024-07-19 | End: 2024-07-19

## 2024-07-19 RX ORDER — TAMSULOSIN HYDROCHLORIDE 0.4 MG/1
0.4 CAPSULE ORAL
Status: DISCONTINUED | OUTPATIENT
Start: 2024-07-19 | End: 2024-07-19

## 2024-07-19 RX ORDER — CLOPIDOGREL BISULFATE 75 MG/1
75 TABLET ORAL DAILY
Status: DISCONTINUED | OUTPATIENT
Start: 2024-07-19 | End: 2024-07-20 | Stop reason: HOSPADM

## 2024-07-19 RX ORDER — MAGNESIUM SULFATE 1 G/100ML
1000 INJECTION INTRAVENOUS ONCE
Status: COMPLETED | OUTPATIENT
Start: 2024-07-19 | End: 2024-07-19

## 2024-07-19 RX ORDER — AMITRIPTYLINE HYDROCHLORIDE 10 MG/1
10 TABLET, FILM COATED ORAL NIGHTLY
COMMUNITY

## 2024-07-19 RX ORDER — INSULIN GLARGINE 100 [IU]/ML
33 INJECTION, SOLUTION SUBCUTANEOUS
Qty: 10 ML | Refills: 0 | Status: CANCELLED | OUTPATIENT
Start: 2024-07-19

## 2024-07-19 RX ORDER — INSULIN LISPRO 100 [IU]/ML
0-8 INJECTION, SOLUTION INTRAVENOUS; SUBCUTANEOUS
Status: DISCONTINUED | OUTPATIENT
Start: 2024-07-19 | End: 2024-07-20 | Stop reason: HOSPADM

## 2024-07-19 RX ORDER — GLUCAGON 1 MG/ML
1 KIT INJECTION PRN
Status: DISCONTINUED | OUTPATIENT
Start: 2024-07-19 | End: 2024-07-20 | Stop reason: HOSPADM

## 2024-07-19 RX ORDER — LANOLIN ALCOHOL/MO/W.PET/CERES
5 CREAM (GRAM) TOPICAL NIGHTLY PRN
COMMUNITY

## 2024-07-19 RX ORDER — SODIUM CHLORIDE 0.9 % (FLUSH) 0.9 %
5-40 SYRINGE (ML) INJECTION PRN
Status: DISCONTINUED | OUTPATIENT
Start: 2024-07-19 | End: 2024-07-20 | Stop reason: HOSPADM

## 2024-07-19 RX ORDER — BETHANECHOL CHLORIDE 25 MG/1
50 TABLET ORAL 3 TIMES DAILY
Status: DISCONTINUED | OUTPATIENT
Start: 2024-07-19 | End: 2024-07-19

## 2024-07-19 RX ORDER — ONDANSETRON 2 MG/ML
4 INJECTION INTRAMUSCULAR; INTRAVENOUS EVERY 6 HOURS PRN
Status: DISCONTINUED | OUTPATIENT
Start: 2024-07-19 | End: 2024-07-20 | Stop reason: HOSPADM

## 2024-07-19 RX ORDER — CARVEDILOL 6.25 MG/1
12.5 TABLET ORAL 2 TIMES DAILY WITH MEALS
Status: DISCONTINUED | OUTPATIENT
Start: 2024-07-19 | End: 2024-07-20 | Stop reason: HOSPADM

## 2024-07-19 RX ORDER — ONDANSETRON 4 MG/1
4 TABLET, ORALLY DISINTEGRATING ORAL EVERY 8 HOURS PRN
Status: DISCONTINUED | OUTPATIENT
Start: 2024-07-19 | End: 2024-07-20 | Stop reason: HOSPADM

## 2024-07-19 RX ORDER — CETIRIZINE HYDROCHLORIDE 10 MG/1
10 TABLET ORAL DAILY
Status: DISCONTINUED | OUTPATIENT
Start: 2024-07-19 | End: 2024-07-20 | Stop reason: HOSPADM

## 2024-07-19 RX ORDER — POLYETHYLENE GLYCOL 3350 17 G/17G
17 POWDER, FOR SOLUTION ORAL DAILY PRN
Status: DISCONTINUED | OUTPATIENT
Start: 2024-07-19 | End: 2024-07-20 | Stop reason: HOSPADM

## 2024-07-19 RX ORDER — DEXTROSE MONOHYDRATE 100 MG/ML
INJECTION, SOLUTION INTRAVENOUS CONTINUOUS PRN
Status: DISCONTINUED | OUTPATIENT
Start: 2024-07-19 | End: 2024-07-20 | Stop reason: HOSPADM

## 2024-07-19 RX ORDER — SODIUM CHLORIDE 0.9 % (FLUSH) 0.9 %
5-40 SYRINGE (ML) INJECTION EVERY 12 HOURS SCHEDULED
Status: DISCONTINUED | OUTPATIENT
Start: 2024-07-19 | End: 2024-07-20 | Stop reason: HOSPADM

## 2024-07-19 RX ORDER — FAMOTIDINE 20 MG/1
20 TABLET, FILM COATED ORAL 2 TIMES DAILY
COMMUNITY

## 2024-07-19 RX ORDER — CARBOXYMETHYLCELLULOSE SODIUM 10 MG/ML
1 GEL OPHTHALMIC PRN
Status: DISCONTINUED | OUTPATIENT
Start: 2024-07-19 | End: 2024-07-20 | Stop reason: HOSPADM

## 2024-07-19 RX ORDER — ENOXAPARIN SODIUM 100 MG/ML
40 INJECTION SUBCUTANEOUS DAILY
Status: DISCONTINUED | OUTPATIENT
Start: 2024-07-19 | End: 2024-07-20 | Stop reason: HOSPADM

## 2024-07-19 RX ORDER — SODIUM CHLORIDE 9 MG/ML
INJECTION, SOLUTION INTRAVENOUS CONTINUOUS
Status: DISCONTINUED | OUTPATIENT
Start: 2024-07-19 | End: 2024-07-19

## 2024-07-19 RX ORDER — ATORVASTATIN CALCIUM 80 MG/1
80 TABLET, FILM COATED ORAL NIGHTLY
Status: DISCONTINUED | OUTPATIENT
Start: 2024-07-19 | End: 2024-07-20 | Stop reason: HOSPADM

## 2024-07-19 RX ORDER — M-VIT,TX,IRON,MINS/CALC/FOLIC 27MG-0.4MG
1 TABLET ORAL DAILY
Status: DISCONTINUED | OUTPATIENT
Start: 2024-07-19 | End: 2024-07-20 | Stop reason: HOSPADM

## 2024-07-19 RX ORDER — ACETAMINOPHEN 500 MG
500 TABLET ORAL EVERY 6 HOURS PRN
Status: DISCONTINUED | OUTPATIENT
Start: 2024-07-19 | End: 2024-07-20 | Stop reason: HOSPADM

## 2024-07-19 RX ORDER — ASPIRIN 81 MG/1
81 TABLET ORAL DAILY
Status: DISCONTINUED | OUTPATIENT
Start: 2024-07-19 | End: 2024-07-20 | Stop reason: HOSPADM

## 2024-07-19 RX ORDER — INSULIN LISPRO 100 [IU]/ML
0-4 INJECTION, SOLUTION INTRAVENOUS; SUBCUTANEOUS NIGHTLY
Status: DISCONTINUED | OUTPATIENT
Start: 2024-07-19 | End: 2024-07-20 | Stop reason: HOSPADM

## 2024-07-19 RX ORDER — DULOXETIN HYDROCHLORIDE 60 MG/1
60 CAPSULE, DELAYED RELEASE ORAL DAILY
Status: DISCONTINUED | OUTPATIENT
Start: 2024-07-19 | End: 2024-07-20 | Stop reason: HOSPADM

## 2024-07-19 RX ORDER — MAGNESIUM SULFATE 1 G/100ML
1000 INJECTION INTRAVENOUS PRN
Status: DISCONTINUED | OUTPATIENT
Start: 2024-07-19 | End: 2024-07-20 | Stop reason: HOSPADM

## 2024-07-19 RX ORDER — LACTOBACILLUS RHAMNOSUS GG 10B CELL
2 CAPSULE ORAL
Status: DISCONTINUED | OUTPATIENT
Start: 2024-07-19 | End: 2024-07-20 | Stop reason: HOSPADM

## 2024-07-19 RX ORDER — SODIUM CHLORIDE 9 MG/ML
INJECTION, SOLUTION INTRAVENOUS PRN
Status: DISCONTINUED | OUTPATIENT
Start: 2024-07-19 | End: 2024-07-20 | Stop reason: HOSPADM

## 2024-07-19 RX ADMIN — SACUBITRIL AND VALSARTAN 1 TABLET: 24; 26 TABLET, FILM COATED ORAL at 20:28

## 2024-07-19 RX ADMIN — SACUBITRIL AND VALSARTAN 1 TABLET: 24; 26 TABLET, FILM COATED ORAL at 08:15

## 2024-07-19 RX ADMIN — Medication 1 TABLET: at 08:15

## 2024-07-19 RX ADMIN — ATORVASTATIN CALCIUM 80 MG: 80 TABLET, FILM COATED ORAL at 02:11

## 2024-07-19 RX ADMIN — DULOXETINE HYDROCHLORIDE 60 MG: 60 CAPSULE, DELAYED RELEASE ORAL at 08:15

## 2024-07-19 RX ADMIN — CLOPIDOGREL BISULFATE 75 MG: 75 TABLET ORAL at 08:15

## 2024-07-19 RX ADMIN — CETIRIZINE HYDROCHLORIDE 10 MG: 10 TABLET, FILM COATED ORAL at 08:15

## 2024-07-19 RX ADMIN — MAGNESIUM SULFATE HEPTAHYDRATE 1000 MG: 1 INJECTION, SOLUTION INTRAVENOUS at 10:56

## 2024-07-19 RX ADMIN — ENOXAPARIN SODIUM 40 MG: 100 INJECTION SUBCUTANEOUS at 08:15

## 2024-07-19 RX ADMIN — SODIUM CHLORIDE 25 ML: 9 INJECTION, SOLUTION INTRAVENOUS at 10:55

## 2024-07-19 RX ADMIN — CARVEDILOL 12.5 MG: 6.25 TABLET, FILM COATED ORAL at 08:15

## 2024-07-19 RX ADMIN — SODIUM CHLORIDE, PRESERVATIVE FREE 10 ML: 5 INJECTION INTRAVENOUS at 08:16

## 2024-07-19 RX ADMIN — ASPIRIN 81 MG: 81 TABLET, COATED ORAL at 08:15

## 2024-07-19 RX ADMIN — SODIUM CHLORIDE: 9 INJECTION, SOLUTION INTRAVENOUS at 01:56

## 2024-07-19 RX ADMIN — SODIUM CHLORIDE, PRESERVATIVE FREE 10 ML: 5 INJECTION INTRAVENOUS at 20:29

## 2024-07-19 RX ADMIN — CARVEDILOL 12.5 MG: 6.25 TABLET, FILM COATED ORAL at 17:21

## 2024-07-19 NOTE — PROCEDURES
Patient: Rissa Becerra    MR Number: [id number]  YOB: 1950  Date of Visit: 7/19/2024    Clinical History:  The patient is a 73 y.o. years old female with possible new onset seizure       Method:  The EEG was performed utilizing the international 10/20 of electrode placements of both referential and bipolar montages. The patient was awake and drowsy through out the recording.  Photic stimulation was performed.    Findings:  The background of the EEG showed normal alpha posterior background of 8-9 HZ and amplitude of 20-40 UV. This background was symmetric, waxing and waning, and reactive with eye opening and closure. As the patient became drowsy, generalized diffuse slowing was seen through recording at 6-7 HZ.  This generalized slowing was asymmetric, non rhythmical, and continuous. Intermittent delta slowing in the right FT seen few times through the recoding for few seconds. No spike or sharp waves were seen.  Photic stimulation did not activate EEG.     Impression:  This EEG  is mildly abnormal.  The right hemispheric  slowing is suggestive of structural dysfunction of that region which is consistent with patient's hx of CVA. There is no evidence of epileptiform discharges.      Shawn Berg MD      Board certified in clinical neurophysiology

## 2024-07-19 NOTE — PROGRESS NOTES
current ADL deficits, it is recommended that the patient have 2-3 sessions per week of Occupational Therapy at d/c to increase the patient's independence.  At this time, this patient demonstrates the endurance and safety to discharge home with home services and 24-hr supervisioin/assist and a follow up treatment frequency of 2-3x/wk.   Please see assessment section for further patient specific details.    HOME HEALTH CARE: LEVEL 1 STANDARD    - Initial home health evaluation to occur within 24-48 hours, in patient home   - Therapy to evaluate with goal of regaining prior level of functioning   - Therapy to evaluate if patient has Home Health Aide needs for personal care   If patient discharges prior to next session this note will serve as a discharge summary.  Please see below for the latest assessment towards goals.      DME Required For Discharge: DME to be determined at next level of care, Pt may need a new cushion for her w/c, such as a Roho.    Precautions/Restrictions: high fall risk  Weight Bearing Restrictions: no restrictions  [] Right Upper Extremity  [] Left Upper Extremity [] Right Lower Extremity  [] Left Lower Extremity     Required Braces/Orthotics: resting hand splint, Not here at hospital   [] Right  [x] Left  Positional Restrictions:no positional restrictions    Pre-Admission Information   Lives With: spouse       (Alli)           Type of Home: house  Home Layout: one level  Home Access:  ramp.   Bathroom Layout: large walk-in shower  Bathroom Equipment: grab bars in shower, hand held shower head, shower chair, grab bars around toilet, Bedside commode  Toilet Height: elevated height  Home Equipment: rolling walker, single point cane, wheelchair, lift chair, david lift, hospital bed, gel w/c cushion (Hurts pt's buttocks)  Transfer Assistance: Pivots on R LE with assist of 1-2 (Gets to chair every day)  Ambulation Assistance: Dependent w/c mobility  ADL Assistance: Assist with bathing, dressing,  mobility completed on this date secondary to pt does not ambulate at baseline; D of 2 transfer..  Balance:  Static Sitting Balance: poor: requires mod (A) to maintain balance  Dynamic Sitting Balance: poor: requires mod (A) to maintain balance  Static Standing Balance: poor (-): requires max (A) to maintain balance  Dynamic Standing Balance: poor (-): requires max (A) to maintain balance  Comments: Min to Mod A static sitting balance with episodes of CGA with rail. Pt tolerated sitting 20 min on EOB with Min to Mod A for sitting balance, CGA when holding rail. L posterior lateral lean.    Other Therapeutic Interventions    Functional Outcomes  AM-PAC Inpatient Daily Activity Raw Score: 12                                    Cognition  Overall Cognitive Status: Impaired  Arousal/Alertness: delayed responses to stimuli  Following Commands: follows one step commands with repetition, follows one step commands with increased time  Attention Span: attends with cues to redirect, difficulty dividing attention  Memory: decreased recall of recent events  Safety Judgement: good awareness of safety precautions  Problem Solving: assistance required to generate solutions, assistance required to implement solutions, decreased awareness of errors, assistance required to identify errors made, assistance required to correct errors made  Insights: fully aware of deficits  Initiation: requires cues for all  Sequencing: requires cues for some  Orientation:    alert and oriented x 4 ( cues for exact date)  Command Following:   impaired     Education  Barriers To Learning: cognition, visual, and physical  Patient Education: patient educated on OT role and benefits, plan of care, transfer training, discharge recommendations  Learning Assessment:  patient will require reinforcement due to cognitive deficits    Assessment  Activity Tolerance: Tolerated well, fatigues with prolonged sitting.  Impairments Requiring Therapeutic Intervention:

## 2024-07-19 NOTE — H&P
V2.0  History and Physical      Name:  Rissa Becerra /Age/Sex: 1950  (73 y.o. female)   MRN & CSN:  [id number] & [id number] Encounter Date/Time: 2024 12:39 AM EDT   Location:  ED- PCP: Stephanie Oneill MD       Hospital Day: 2    Assessment and Plan:   Rissa Becerra is a 73 y.o. 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  Stroke alert initiated.  No TNK given.  MRI brain without acute stroke.  Hospital Problems             Last Modified POA    * (Principal) Stroke-like symptoms 2024 Yes       Plan:  # Right sided twitching/seizure-like activity.  Likely due to hypomagnesemia.  # History of CVA in 2024 with residual left-sided weakness and left-sided neglect  -No known history of seizures.  -CT head without acute findings.  But showed remote right MCA territory infarct.  -CTA head and neck with no new large vessel occlusion.  Stable left PCA occlusion from February.  Stable moderate mid right M1 and proximal right M2 segment flow limitation.  -Stroke alert initiated in the ED.  -Last known well 7:15 PM 24  -Initial NIH stroke scale of 20.  -Stat MRI brain without acute stroke.  -No TNK given per teleneurology recommendation.  -She had no further seizure-like activities in the ED.  No AED started.  -Admit to stepdown telemetry.  -Resume home aspirin, Plavix and statin.  -Consulted neurology.  -NIH stroke scale and neurochecks every 4 hours.  -Seizure precautions.  -Echo from February reviewed.    # Hypomagnesemia.  Magnesium of 1.2.  Given 2 g of magnesium in the ED.  Repeat level pending.    # Mild hyponatremia.  Likely due to volume depletion as family reports poor oral intake.  Hold home Lasix.   significant stenosis. SOFT TISSUES: The lung apices are clear.  No cervical or superior mediastinal lymphadenopathy.  The larynx and pharynx are unremarkable.  No acute abnormality of the salivary and thyroid glands. BONES: No acute osseous abnormality. CTA HEAD: ANTERIOR CIRCULATION: Moderate stenosis of the mid right M1 segment and a proximal right M2 segment.  Attenuated flow is again noted in the more distal right MCA branches in the right parietal region, similar compared to prior. Moderate stenosis of a proximal left M2 segment.  No significant stenosis of the intracranial internal carotid or anterior cerebral arteries. No aneurysm. POSTERIOR CIRCULATION: There is unchanged occlusion of the P1 segment of the left PCA.  No significant stenosis of the vertebral, basilar, or right posterior cerebral arteries. No aneurysm. OTHER: No evidence of dural venous sinus thrombosis on this non-dedicated study.     1. No appreciable acute intracranial abnormality.  Remote right MCA territory infarct. 2. Occlusion of the P1 segment of the left PCA, unchanged from prior. 3. Moderate stenosis of the mid right M1 segment and a proximal right M2 segment with attenuated flow in the more distal right parietal MCA branches, similar to prior.  Moderate stenosis of a proximal left M2 segment. 4. No significant stenosis of the cervical carotid or vertebral arteries. Findings were discussed with Dr. Eduardo Ramirez at 9:03 p.m. on 07/18/2024.     CTA HEAD NECK W CONTRAST    Result Date: 7/18/2024  EXAMINATION: CT OF THE HEAD WITHOUT CONTRAST; CTA OF THE HEAD AND NECK WITH CONTRAST 7/18/2024 7:25 pm; 7/18/2024 7:26 pm: TECHNIQUE: CT of the head was performed without the administration of intravenous contrast. Automated exposure control, iterative reconstruction, and/or weight based adjustment of the mA/kV was utilized to reduce the radiation dose to as low as reasonably achievable.; CTA of the head and neck was performed with the  administration of intravenous contrast. Multiplanar reformatted images are provided for review.  MIP images are provided for review. Stenosis of the internal carotid arteries measured using NASCET criteria. Automated exposure control, iterative reconstruction, and/or weight based adjustment of the mA/kV was utilized to reduce the radiation dose to as low as reasonably achievable. Noncontrast CT of the head with reconstructed 2-D images are also provided for review. COMPARISON: CTA head and neck 02/12/2024 HISTORY: ORDERING SYSTEM PROVIDED HISTORY: Stroke TECHNOLOGIST PROVIDED HISTORY: Has a \"code stroke\" or \"stroke alert\" been called?->Yes Reason for exam:->Stroke Decision Support Exception - unselect if not a suspected or confirmed emergency medical condition->Emergency Medical Condition (MA) Reason for Exam: Stroke; ORDERING SYSTEM PROVIDED HISTORY: R sided weakness TECHNOLOGIST PROVIDED HISTORY: Reason for exam:->R sided weakness Has a \"code stroke\" or \"stroke alert\" been called?->Yes Decision Support Exception - unselect if not a suspected or confirmed emergency medical condition->Emergency Medical Condition (MA) Reason for Exam: R sided weakness FINDINGS: CT HEAD: BRAIN/VENTRICLES: There is a remote right MCA territory infarct with encephalomalacia in the right frontal, temporal and parietal lobes.  There is mild to moderate diffuse parenchymal atrophy and ventricular dilatation. Mild ex vacuo dilatation of the right lateral ventricle.  Patchy bilateral periventricular and subcortical white matter hypodensities are nonspecific, but compatible with chronic microvascular ischemic change.  No acute intracranial hemorrhage or extraaxial fluid collection.  No evidence of mass, mass effect or midline shift. ORBITS: The visualized portion of the orbits demonstrate no acute abnormality. SINUSES:  The visualized paranasal sinuses and mastoid air cells demonstrate no acute abnormality. SOFT TISSUES/SKULL: No acute

## 2024-07-19 NOTE — PLAN OF CARE
Problem: Discharge Planning  Goal: Discharge to home or other facility with appropriate resources  Outcome: Progressing  Flowsheets (Taken 7/19/2024 0115)  Discharge to home or other facility with appropriate resources: Identify discharge learning needs (meds, wound care, etc)     Problem: Safety - Adult  Goal: Free from fall injury  Outcome: Progressing     Problem: Skin/Tissue Integrity  Goal: Absence of new skin breakdown  Description: 1.  Monitor for areas of redness and/or skin breakdown  2.  Assess vascular access sites hourly  3.  Every 4-6 hours minimum:  Change oxygen saturation probe site  4.  Every 4-6 hours:  If on nasal continuous positive airway pressure, respiratory therapy assess nares and determine need for appliance change or resting period.  Outcome: Progressing     Problem: ABCDS Injury Assessment  Goal: Absence of physical injury  Outcome: Progressing     Problem: Confusion  Goal: Confusion, delirium, dementia, or psychosis is improved or at baseline  Description: INTERVENTIONS:  1. Assess for possible contributors to thought disturbance, including medications, impaired vision or hearing, underlying metabolic abnormalities, dehydration, psychiatric diagnoses, and notify attending LIP  2. [place] high risk fall precautions, as indicated  3. Provide frequent short contacts to provide reality reorientation, refocusing and direction  4. Decrease environmental stimuli, including noise as appropriate  5. Monitor and intervene to maintain adequate nutrition, hydration, elimination, sleep and activity  6. If unable to ensure safety without constant attention obtain sitter and review sitter guidelines with assigned personnel  7. Initiate Psychosocial CNS and Spiritual Care consult, as indicated  Outcome: Progressing  Flowsheets (Taken 7/19/2024 0115)  Effect of thought disturbance (confusion, delirium, dementia, or psychosis) are managed with adequate functional status: Decrease environmental  stimuli, including noise as appropriate     Problem: Neurosensory - Adult  Goal: Achieves stable or improved neurological status  Outcome: Progressing  Flowsheets (Taken 7/19/2024 0115)  Achieves stable or improved neurological status:   Assess for and report changes in neurological status   Maintain blood pressure and fluid volume within ordered parameters to optimize cerebral perfusion and minimize risk of hemorrhage  Goal: Absence of seizures  Outcome: Progressing  Flowsheets (Taken 7/19/2024 0115)  Absence of seizures: Diagnostic studies as ordered  Goal: Remains free of injury related to seizures activity  Outcome: Progressing  Flowsheets (Taken 7/19/2024 0115)  Remains free of injury related to seizure activity: Maintain airway, patient safety  and administer oxygen as ordered     Problem: Cardiovascular - Adult  Goal: Maintains optimal cardiac output and hemodynamic stability  Outcome: Progressing  Flowsheets (Taken 7/19/2024 0115)  Maintains optimal cardiac output and hemodynamic stability:   Monitor blood pressure and heart rate   Monitor urine output and notify Licensed Independent Practitioner for values outside of normal range   Administer fluid and/or volume expanders as ordered     Problem: Skin/Tissue Integrity - Adult  Goal: Skin integrity remains intact  Outcome: Progressing  Flowsheets (Taken 7/19/2024 0115)  Skin Integrity Remains Intact: Monitor for areas of redness and/or skin breakdown

## 2024-07-19 NOTE — PROGRESS NOTES
Facility/Department: [address]  Speech Language Pathology  DYSPHAGIA BEDSIDE SWALLOW EVALUATION     Patient: Rissa Becerra   : 1950   MRN: [id number]      Evaluation Date: 2024   Admitting Diagnosis: Hypomagnesemia [E83.42]  Seizure-like activity (HCC) [R56.9]  Right sided weakness [R53.1]  Stroke-like symptoms [R29.90]  Weakness due to old stroke [I69.998, R53.1]  Pain: Did not state                                                       H&P: Rissa Becerra is a 73 y.o. 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  The patient has some memory deficits hence her daughter and  at bedside assisted with history.  Per the patient's daughter, whilst at rest, the patient started experiencing right sided twitching/tremors x 2 episodes that lasted about 2 minutes each time.  She appeared to have brief decreased LOC with each episode.  Per the family, this is new.  She has no seizure history.  The squad was called.  Whiles enroute the squad also reported same tremors/seizure-like activity x 4 episodes but states that the patient was talking with each activity and did not lose consciousness.  No reported tongue biting, falls, she has baseline urine and stool incontinence, no chest pain, no fever, no chills, no SOB, no nausea, vomiting, diarrhea, dizziness and lightheadedness.  Stroke alert initiated in the ED.  Stat CT head showed old infarct.  CTA head and neck without new LVO.  Stat MRI without acute stroke.  No TNK given.  As symptoms started improving whilst in the ED.  No further seizure-like activity in the ED.  Her magnesium was found to be 1.2 and is currently being replaced.     Imaging:  No recent chest imaging    MRI:  No acute infarct.     Prior Modified Barium Swallow  of Meds: Meds whole with water        Compensatory Swallowing Strategies:  Alternate solids/liquids , Upright as possible with all PO intake , Assist Feed , Small bites/sips , Eat/feed slowly, Aspiration Precautions     SHORT TERM DYSPHAGIA GOALS/PLAN OF CARE: Speech therapy for dysphagia tx 3-5 times per week during acute care stay.    Pt will functionally tolerate recommended diet with no overt clinical s/s of aspiration   Pt will demonstrate understanding of aspiration risk and precautions via education/demonstration with occasional prompting    Dysphagia Therapeutic Intervention:  Diet Tolerance Monitoring , Patient/Family Education     Discharge Recommendations: Recommend ongoing SLP for dysphagia therapy upon discharge from hospital     Patient Positioning: Upright in chair    Current Diet Level (prior to evaluation): Regular texture diet  Thin liquids      Respiratory Status:   [x]Room Air   []O2 via nasal cannula  []Previously intubated:   Total days intubated:  Date extubated:    []Other:    Dentition:  [x]Adequate  []Dentures   []Missing Many Teeth  []Edentulous  []Other:    Baseline Vocal Quality:  [x]Normal  []Dysphonic   []Aphonic   []Hoarse  []Wet  []Weak  []Other:    Volitional Cough:  Not Elicited     Volitional Swallow:   []Absent   [x]Delayed     []Adequate     []Required use of drink     Oral Mechanism Exam:  []WFL []Mild   [x] Moderate  []Severe  []To be assessed  Impaired:   [x]Left side      []Right side    [x]Labial ROM/Coordination    [x]Labial Symmetry   [x]Lingual ROM/Coordination   []Lingual Symmetry  []Gag  []Other:     Oral Phase: []WFL []Mild   [x] Moderate  []Severe  []To be assessed   [x]Impaired/Prolonged Mastication:   []Oral Holding:   []Spillage Left:   []Spillage Right:  []Pocketing Left:   []Pocketing Right:   [x]Decreased Anterior to Posterior Transit:   [x]Suspected Premature Bolus Loss:   [x]Lingual/Palatal Residue:   [x]Other: anterior loss with liquids     Pharyngeal Phase:

## 2024-07-19 NOTE — CONSULTS
In patient Neurology consult        [facility] Neurology      Shawn Berg MD      Rissa Becerra  1950    Date of Service: 7/19/2024    Referring Physician: Stormy Phelps MD      Reason for the consult and CC: Acute right side abnormal movement    HPI:   The patient is a 73 y.o.  years old female with past medical history of hypertension, hyperlipidemia, diabetes, CHF, and right MCA ischemic stroke who comes to the hospital with confusion and right arm and leg jerking movement.  Degree severe duration minutes.  Description right arm and leg rhythmic movement, unable to respond, staring off followed by confusion. Family called EMS and they witnessed such movement ansd was described as jerking movements. In the emergency room CT head no acute abnormaily. CTA imaging showed known left PCA occlusion and right MCA stenosis.  LAb workup showed hypomagnesemia,1.2. Stroke team was consulted and MRI was recommended. MRI brain last night showed no acute stroke. TNK was deferred,IV fluids and Mag was replaced. Patient was admitted to the hospital.  Today patient is resting in chair.  She has dense left-sided weakness and left neglect.   Family reports she has no prior history of such movements. She has an appointment with [facility] neurology next month.  Today, patient denies headache, dizziness, vision changes, or chest pain.  Other review of systems unremarkable       Constitutional:   Vitals:    07/19/24 0115 07/19/24 0341 07/19/24 0721 07/19/24 0901   BP: 129/74 118/64 136/61    Pulse:  80 80    Resp: 18 18 18    Temp: 98.5 °F (36.9 °C) 97.4 °F (36.3 °C) 97.6 °F (36.4 °C)    TempSrc: Oral Axillary Oral    SpO2: 92% 95% 96%    Weight:    84.3 kg (185 lb 12.8 oz)   Height:             I personally reviewed and updated social history, past medical history, medications, allergy, surgical history, and family history as documented in the patient's electronic health records.       ROS: 10-14 ROS reviewed with the

## 2024-07-19 NOTE — ED NOTES
How does patient ambulate?   []Low Fall Risk (ambulates by themselves without support)  []Stand by assist   []Contact Guard   []Front wheel walker  [x]Wheelchair   []Steady  []Bed bound  []History of Lower Extremity Amputation  []Unknown, did not assess in the emergency department   How does patient take pills?  [x]Whole with Water  []Crushed in applesauce  []Crushed in pudding  []Other  []Unknown no oral medications were given in the ED  Is patient alert?   [x]Alert  []Drowsy but responds to voice  []Doesn't respond to voice but responds to painful stimuli  []Unresponsive  Is patient oriented?   [x]To person  [x]To place  [x]To time  [x]To situation  []Confused  []Agitated  []Follows commands  If patient is disoriented or from a Skill Nursing Facility has family been notified of admission?   NA  Patient belongings?   []Cell phone  []Wallet   []Dentures  [x]Clothing  Any specific patient or family belongings/needs/dynamics?     Miscellaneous comments/pending orders?       If there are any additional questions please reach out to the Emergency Department.

## 2024-07-19 NOTE — ED PROVIDER NOTES
[facility] EMERGENCY DEPARTMENT  EMERGENCY DEPARTMENT ENCOUNTER      Pt Name: Rissa Becerra  MRN: [id number]  Birthdate 1950  Date of evaluation: 7/18/2024  Provider: Eduardo Ramirez MD    CHIEF COMPLAINT       Chief Complaint   Patient presents with    Seizures     Pt via [place] ems w/ reports of 4 seizures over 30 minutes. HX of stroke with severe left sided deficits. Pt having new right sided deficits post seizures.          HISTORY OF PRESENT ILLNESS   (Location/Symptom, Timing/Onset, Context/Setting, Quality, Duration, Modifying Factors, Severity)  Note limiting factors.   Rissa Becerra is a 73 y.o. female who presents to the emergency department for tremoring activity to her right arm and right leg as well as increased confusion.  She was with her daughter when this started around 7:15 PM.  Daughter reports that the patient usually has great strength on the right side and is able to do chair yoga and physical therapy.  Patient is paralyzed on the left side from stroke in February.  She was not a TNK candidate in February for her stroke.  She has no history of seizure.  EMS reports 4 episodes of twitching that they witnessed en route to the hospital.  The patient was talking to them during these episodes.  Daughter denies any history of seizure.  There has been no recent medication change specifically no recent antipsychotic use to explain the tremors.  Patient takes aspirin and Plavix, last dose this morning.    Chart reviewed: Admission February of this year reviewed, patient had MRI showing right MCA infarct, aspiration pneumonia.  Also history of CAD and CHF, type 2 diabetes  Nursing Notes were reviewed.    REVIEW OF SYSTEMS    (2-9 systems for level 4, 10 or more for level 5)     Review of Systems    Except as noted above the remainder of the review of systems was reviewed and negative.       PAST MEDICAL HISTORY     Past Medical History:   Diagnosis Date    Arthropathy     Asthma   Daughter is agreeable to this plan.  Discussed findings with Dr. Goetz who agrees to avoid TNK today.    Disposition Considerations (Tests not ordered but considered, Shared Decision Making, Pt Expectation of Test or Tx.):  MRI not deemed clinically necessary in the ED setting  Social Determinants : None          CRITICAL CARE TIME   Total Critical Care time was 38 minutes, excluding separately reportable procedures.  There was a high probability of clinically significant/life threatening deterioration in the patient's condition which required my urgent intervention.        PROCEDURES:  Unless otherwise noted below, none     Procedures    FINAL IMPRESSION      1. Hypomagnesemia    2. Right sided weakness    3. Weakness due to old stroke          DISPOSITION/PLAN   DISPOSITION Decision To Admit 07/18/2024 11:24:06 PM      PATIENT REFERRED TO:  No follow-up provider specified.    DISCHARGE MEDICATIONS:      New Prescriptions    No medications on file       Controlled Substances Monitoring:    If the patient was prescribed a controlled substance today, the PDMP was reviewed as documented below.         No data to display                (Please note that portions of this note were completed with a voice recognition program.  Efforts were made to edit the dictations but occasionally words are mis-transcribed.)    Eduardo Ramirez MD (electronically signed)  Attending Emergency Physician           Eduardo Ramirez MD  07/18/24 2294

## 2024-07-19 NOTE — DISCHARGE INSTR - COC
Continuity of Care Form    Patient Name: Rissa Becerra   :  1950  MRN:  [id number]    Admit date:  2024  Discharge date:  2024    Code Status Order: DNR-CCA   Advance Directives:     Admitting Physician:  Melita Saeed MD  PCP: Stephanie Oneill MD    Discharging Nurse: [facility] 3TN-3374/3374-01  Discharging Unit Phone Number: [phone]    Emergency Contact:   Extended Emergency Contact Information  Primary Emergency Contact: Flo Becerra  Home Phone: [phone]  Relation: Spouse  Preferred language: English   needed? No  Secondary Emergency Contact: Brianna Myers  Address: [address] [place] [address]  Home Phone: [phone]  Mobile Phone: [phone]  Relation: Child    Past Surgical History:  Past Surgical History:   Procedure Laterality Date    CARPAL TUNNEL RELEASE      HERNIA REPAIR      PTCA  2023    ROTATOR CUFF REPAIR      right shoulder    TOTAL KNEE ARTHROPLASTY      right and left        Immunization History:   Immunization History   Administered Date(s) Administered    COVID-19, PFIZER Bivalent, DO NOT Dilute, (age 12y+), IM, 30 mcg/0.3 mL 2022       Active Problems:  Patient Active Problem List   Diagnosis Code    Localized edema R60.0    Diabetic polyneuropathy (HCC) E11.42    Acute CVA (cerebrovascular accident) ([facility]) I63.9    Left hemiplegia ([facility]) G81.94    Dysphagia R13.10    CAD (coronary artery disease) I25.10    DM2 (diabetes mellitus, type 2) (HCC) E11.9    HTN (hypertension), benign I10    Obesity (BMI 30-39.9) E66.9    Acute left-sided weakness R53.1    Arterial ischemic stroke, ICA, right, acute ([facility]) I63.231    DM (diabetes mellitus), secondary, with complications ([facility]) E13.8    Dyslipidemia E78.5    Closed displaced fracture of lateral malleolus of left fibula S82.62XA    Acute cerebrovascular accident (CVA) due to ischemia ([facility]) I63.9  ALICIA PARHAM, RN on 7/20/24 at 12:07 PM EDT    CASE MANAGEMENT/SOCIAL WORK SECTION    Inpatient Status Date: 07/18/2024    Readmission Risk Assessment Score: 16%  Readmission Risk              Risk of Unplanned Readmission:  21           Discharging to Facility/ Agency     [facility]  [address] [place] [address], [address]  [place] [id number]  Phone: [phone]  Fax:  [phone]      / signature: Electronically signed by ERI Olvera on 7/19/2024 at 2:22 PM      PHYSICIAN SECTION    Prognosis: Fair    Condition at Discharge: Stable    Rehab Potential (if transferring to Rehab): Fair    Recommended Labs or Other Treatments After Discharge:   Repeat BMP, and Magnesium on Monday 7/22/24 and send results to PCP.    Physician Certification: I certify the above information and transfer of Rissa Becerra  is necessary for the continuing treatment of the diagnosis listed and that she requires Home Care for greater 30 days.     Update Admission H&P: No change in H&P    PHYSICIAN SIGNATURE:  Electronically signed by Stormy Phelps MD on 7/19/24 at 2:08 PM EDT

## 2024-07-19 NOTE — CARE COORDINATION
Case Management Assessment  Initial Evaluation    Date/Time of Evaluation: 7/19/2024 1:58 PM  Assessment Completed by: ERI Olvera    If patient is discharged prior to next notation, then this note serves as note for discharge by case management.    Patient Name: Rissa Becerra                   YOB: 1950  Diagnosis: Hypomagnesemia [E83.42]  Seizure-like activity (HCC) [R56.9]  Right sided weakness [R53.1]  Stroke-like symptoms [R29.90]  Weakness due to old stroke [I69.998, R53.1]                   Date / Time: 7/18/2024  8:07 PM    Patient Admission Status: Inpatient   Readmission Risk (Low < 19, Mod (19-27), High > 27): Readmission Risk Score: 16.1    Current PCP: Stephanie Oneill MD  PCP verified by CM? Yes    Chart Reviewed: Yes      History Provided by: Patient, Significant Other  Patient Orientation: Alert and Oriented    Patient Cognition: Alert    Hospitalization in the last 30 days (Readmission):  No    If yes, Readmission Assessment in CM Navigator will be completed.    Advance Directives:      Code Status: DNR-CCA   Patient's Primary Decision Maker is: Named in Scanned ACP Document      Discharge Planning:    Patient lives with: Spouse/Significant Other Type of Home: House  Primary Care Giver: Family  Patient Support Systems include: Spouse/Significant Other, Family Members, Children   Current Financial resources: Medicare  Current community resources: None  Current services prior to admission: Home Care, Durable Medical Equipment            Current DME: Wheelchair, Hospital Bed, Shower Chair, Walker            Type of Home Care services:  OT, PT, Nursing Services, Skilled Therapy, Aide Services    ADLS  Prior functional level: Assistance with the following:, Mobility, Shopping, Housework, Cooking, Feeding, Toileting, Dressing, Bathing  Current functional level: Assistance with the following:, Bathing, Dressing, Toileting, Feeding, Cooking, Housework, Shopping, Mobility    PT  [R29.90]  Weakness due to old stroke [I69.998, R53.1]    IF APPLICABLE: The Patient and/or patient representative [place] and her family were provided with a choice of provider and agrees with the discharge plan. Freedom of choice list with basic dialogue that supports the patient's individualized plan of care/goals and shares the quality data associated with the providers was provided to:     Patient Representative Name:       The Patient and/or Patient Representative Agree with the Discharge Plan?          Electronically signed by ERI Olvera on 7/19/2024 at 2:05 PM

## 2024-07-19 NOTE — PROGRESS NOTES
[facility] - Inpatient Rehabilitation Department   Phone: [phone]    Physical Therapy    [x] Initial Evaluation            [] Daily Treatment Note         [] Discharge Summary      Patient: Rissa Becerra   : 1950   MRN: [id number]   Date of Service:  2024  Admitting Diagnosis: Stroke-like symptoms  Current Admission Summary: Per H&P on  \"73 y.o. 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  Stroke alert initiated.  No TNK given.  MRI brain without acute stroke. \"  Past Medical History:  has a past medical history of Arthropathy, Asthma, Bell's palsy, Bronchitis, Diabetes mellitus (HCC), Diverticulitis, Hyperlipidemia, Hypertension, Migraine, Polyneuropathy in diabetes (HCC), and Vertigo.  Past Surgical History:  has a past surgical history that includes hernia repair; Carpal tunnel release; Total knee arthroplasty; Rotator cuff repair; and Percutaneous Transluminal Coronary Angio (2023).    Discharge Recommendations: Rissa Becerra scored a / on the AM-PAC short mobility form. Current research shows that an AM-PAC score of 18 or greater is typically associated with a discharge to the patient's home setting. Based on the patient's AM-PAC score and their current functional mobility deficits, it is recommended that the patient have 2-3 sessions per week of Physical Therapy at d/c to increase the patient's independence.  At this time, this patient demonstrates the endurance and safety to discharge home with  assist and home PT and a follow up treatment frequency of 2-3x/wk.  Please see assessment section for further patient specific details.    HOME HEALTH CARE: LEVEL 1 STANDARD  - Initial home  health evaluation to occur within 24-48 hours, in patient home   - Therapy to evaluate with goal of regaining prior level of functioning   - Therapy to evaluate if patient has Home Health Aide needs for personal care    If patient discharges prior to next session this note will serve as a discharge summary.  Please see below for the latest assessment towards goals.       DME Required For Discharge: patient has all required DME for discharge     Precautions/Restrictions: high fall risk, SLP [place] pending at this time  Weight Bearing Restrictions: no restrictions  [] Right Upper Extremity  [] Left Upper Extremity [] Right Lower Extremity  [] Left Lower Extremity     Required Braces/Orthotics: no braces required   [] Right  [] Left  Positional Restrictions:no positional restrictions    Pre-Admission Information   Lives With: spouse       ([place])           Type of Home: house  Home Layout: one level  Home Access:  ramp.   Bathroom Layout: large walk-in shower  Bathroom Equipment: grab bars in shower, hand held shower head, shower chair, grab bars around toilet, Bedside commode  Toilet Height: elevated height  Home Equipment: rolling walker, single point cane, wheelchair, lift chair, david lift, hospital bed, gel w/c cushion (Hurts pt's buttocks)  Transfer Assistance: Pivots on R LE with assist of 1-2 Gets to chair every day)  Ambulation Assistance: Dependent w/c mobility  ADL Assistance: Assist with bathing, dressing, toileting (incontinent of urine and stool); set-up for feeding, grooming ([facility] aide 2 X a week, supplemented by private aides. Family also assists) Someone assists spouse at least 3 times per day. Needs assist rf 2 to turn in bed.  IADL Assistance: independent with homemaking tasks  Active :        [] Yes                 [x] No (w/c accessible van)  Hand Dominance: [] Left                 [x] Right  Current Employment: retired.    Recent Falls: 3 falls in 1 week after home April 13. 3-4 around Feb when  positioned for comfort with feet elevated and pillows under (B) UE. Bed alarm on, call light and all needs within reach. Spouse present. RN updated.     Other Therapeutic Interventions  See OT note for assist with ADLs at EOB.     Functional Outcomes  AM-PAC Inpatient Mobility Raw Score : 7              Cognition  Overall Cognitive Status: Impaired  Arousal/Alertness: delayed responses to stimuli  Following Commands: follows one step commands with repetition, follows one step commands with increased time  Attention Span: attends with cues to redirect, difficulty dividing attention  Memory: decreased recall of recent events, decreased short term memory  Safety Judgement: good awareness of safety precautions  Problem Solving: assistance required to generate solutions, assistance required to implement solutions, decreased awareness of errors, assistance required to identify errors made, assistance required to correct errors made  Insights: fully aware of deficits  Initiation: requires cues for some  Sequencing: requires cues for some  Comments: Pt with decreased awareness of errors and difficulty problem solving which creates impaired safety  Orientation:    alert and oriented x 4 (cues for exact date)  Command Following:   impaired - requires repetition and increased time    Education  Barriers To Learning: cognition, visual, and physical  Patient Education: patient educated on goals, PT role and benefits, plan of care, general safety, functional mobility training, family education, transfer training, discharge recommendations  Learning Assessment:  patient verbalizes understanding, would benefit from continued reinforcement, patient will require reinforcement due to cognitive deficits    Assessment  Activity Tolerance: Vitals stable throughout session. Pt did fatigue with prolonged sitting at EOB.   Impairments Requiring Therapeutic Intervention: decreased functional mobility, decreased ADL status, decreased ROM,

## 2024-07-19 NOTE — CARE COORDINATION
Discharge Planning:     (CM) faxed the [facility] orders to [facility] [phone] and the fa went through successfully.       Electronically signed by ERI Olvera on 7/19/2024 at 2:22 PM

## 2024-07-19 NOTE — PLAN OF CARE
Problem: Discharge Planning  Goal: Discharge to home or other facility with appropriate resources  7/19/2024 1113 by Shahnaz Florence RN  Outcome: Progressing  Flowsheets (Taken 7/19/2024 0815)  Discharge to home or other facility with appropriate resources: Identify barriers to discharge with patient and caregiver  7/19/2024 0524 by Omi Dias RN  Outcome: Progressing  Flowsheets (Taken 7/19/2024 0115)  Discharge to home or other facility with appropriate resources: Identify discharge learning needs (meds, wound care, etc)     Problem: Safety - Adult  Goal: Free from fall injury  7/19/2024 1113 by Shahnaz Florence RN  Outcome: Progressing  7/19/2024 0524 by Omi Dias RN  Outcome: Progressing     Problem: Skin/Tissue Integrity  Goal: Absence of new skin breakdown  Description: 1.  Monitor for areas of redness and/or skin breakdown  2.  Assess vascular access sites hourly  3.  Every 4-6 hours minimum:  Change oxygen saturation probe site  4.  Every 4-6 hours:  If on nasal continuous positive airway pressure, respiratory therapy assess nares and determine need for appliance change or resting period.  7/19/2024 1113 by Shahnaz Florence RN  Outcome: Progressing  7/19/2024 0524 by Omi Dias RN  Outcome: Progressing     Problem: ABCDS Injury Assessment  Goal: Absence of physical injury  7/19/2024 1113 by Shahnaz Florence RN  Outcome: Progressing  7/19/2024 0524 by Omi Dias RN  Outcome: Progressing     Problem: Confusion  Goal: Confusion, delirium, dementia, or psychosis is improved or at baseline  Description: INTERVENTIONS:  1. Assess for possible contributors to thought disturbance, including medications, impaired vision or hearing, underlying metabolic abnormalities, dehydration, psychiatric diagnoses, and notify attending LIP  2. [place] high risk fall precautions, as indicated  3. Provide frequent short contacts to provide reality reorientation, refocusing and direction  4. Decrease  environmental stimuli, including noise as appropriate  5. Monitor and intervene to maintain adequate nutrition, hydration, elimination, sleep and activity  6. If unable to ensure safety without constant attention obtain sitter and review sitter guidelines with assigned personnel  7. Initiate Psychosocial CNS and Spiritual Care consult, as indicated  7/19/2024 1113 by Shahnaz Florence RN  Outcome: Progressing  Flowsheets (Taken 7/19/2024 0815)  Effect of thought disturbance (confusion, delirium, dementia, or psychosis) are managed with adequate functional status: Assess for contributors to thought disturbance, including medications, impaired vision or hearing, underlying metabolic abnormalities, dehydration, psychiatric diagnoses, notify LIP  7/19/2024 0524 by Omi Dias RN  Outcome: Progressing  Flowsheets (Taken 7/19/2024 0115)  Effect of thought disturbance (confusion, delirium, dementia, or psychosis) are managed with adequate functional status: Decrease environmental stimuli, including noise as appropriate     Problem: Neurosensory - Adult  Goal: Achieves stable or improved neurological status  7/19/2024 1113 by Shahnaz Florence RN  Outcome: Progressing  Flowsheets (Taken 7/19/2024 0815)  Achieves stable or improved neurological status: Assess for and report changes in neurological status  7/19/2024 0524 by Omi Dias RN  Outcome: Progressing  Flowsheets (Taken 7/19/2024 0115)  Achieves stable or improved neurological status:   Assess for and report changes in neurological status   Maintain blood pressure and fluid volume within ordered parameters to optimize cerebral perfusion and minimize risk of hemorrhage  Goal: Absence of seizures  7/19/2024 1113 by Shahnaz Florence RN  Outcome: Progressing  Flowsheets (Taken 7/19/2024 0815)  Absence of seizures: If seizure occurs, turn head to side and suction secretions as needed  7/19/2024 0524 by Omi Dias RN  Outcome: Progressing  Flowsheets (Taken

## 2024-07-20 VITALS
WEIGHT: 160.05 LBS | DIASTOLIC BLOOD PRESSURE: 63 MMHG | SYSTOLIC BLOOD PRESSURE: 108 MMHG | BODY MASS INDEX: 29.45 KG/M2 | HEART RATE: 93 BPM | HEIGHT: 62 IN | RESPIRATION RATE: 18 BRPM | OXYGEN SATURATION: 95 % | TEMPERATURE: 98.7 F

## 2024-07-20 PROBLEM — G93.40 ACUTE ENCEPHALOPATHY: Status: ACTIVE | Noted: 2024-07-20

## 2024-07-20 PROBLEM — D50.9 IDA (IRON DEFICIENCY ANEMIA): Status: ACTIVE | Noted: 2024-07-20

## 2024-07-20 PROBLEM — I69.998 WEAKNESS DUE TO OLD STROKE: Status: ACTIVE | Noted: 2024-07-20

## 2024-07-20 PROBLEM — R79.89 ELEVATED TROPONIN: Status: ACTIVE | Noted: 2024-07-20

## 2024-07-20 PROBLEM — R53.1 WEAKNESS DUE TO OLD STROKE: Status: ACTIVE | Noted: 2024-07-20

## 2024-07-20 LAB
ANION GAP SERPL CALCULATED.3IONS-SCNC: 11 MMOL/L (ref 3–16)
BUN SERPL-MCNC: 13 MG/DL (ref 7–20)
CALCIUM SERPL-MCNC: 8.9 MG/DL (ref 8.3–10.6)
CHLORIDE SERPL-SCNC: 98 MMOL/L (ref 99–110)
CO2 SERPL-SCNC: 26 MMOL/L (ref 21–32)
CREAT SERPL-MCNC: <0.5 MG/DL (ref 0.6–1.2)
DEPRECATED RDW RBC AUTO: 17.7 % (ref 12.4–15.4)
EKG ATRIAL RATE: 91 BPM
EKG DIAGNOSIS: NORMAL
EKG P AXIS: 60 DEGREES
EKG P-R INTERVAL: 154 MS
EKG Q-T INTERVAL: 402 MS
EKG QRS DURATION: 148 MS
EKG QTC CALCULATION (BAZETT): 494 MS
EKG R AXIS: -63 DEGREES
EKG T AXIS: 107 DEGREES
EKG VENTRICULAR RATE: 91 BPM
FERRITIN SERPL IA-MCNC: 108 NG/ML (ref 15–150)
FOLATE SERPL-MCNC: 10.89 NG/ML (ref 4.78–24.2)
GFR SERPLBLD CREATININE-BSD FMLA CKD-EPI: >90 ML/MIN/{1.73_M2}
GLUCOSE BLD-MCNC: 122 MG/DL (ref 70–99)
GLUCOSE BLD-MCNC: 181 MG/DL (ref 70–99)
GLUCOSE SERPL-MCNC: 128 MG/DL (ref 70–99)
HCT VFR BLD AUTO: 32.5 % (ref 36–48)
HGB BLD-MCNC: 10.8 G/DL (ref 12–16)
IRON SATN MFR SERPL: 17 % (ref 15–50)
IRON SERPL-MCNC: 39 UG/DL (ref 37–145)
MAGNESIUM SERPL-MCNC: 1.8 MG/DL (ref 1.8–2.4)
MCH RBC QN AUTO: 28.6 PG (ref 26–34)
MCHC RBC AUTO-ENTMCNC: 33.1 G/DL (ref 31–36)
MCV RBC AUTO: 86.5 FL (ref 80–100)
PERFORMED ON: ABNORMAL
PERFORMED ON: ABNORMAL
PLATELET # BLD AUTO: 355 K/UL (ref 135–450)
PMV BLD AUTO: 6.7 FL (ref 5–10.5)
POTASSIUM SERPL-SCNC: 3.9 MMOL/L (ref 3.5–5.1)
RBC # BLD AUTO: 3.76 M/UL (ref 4–5.2)
SODIUM SERPL-SCNC: 135 MMOL/L (ref 136–145)
TIBC SERPL-MCNC: 235 UG/DL (ref 260–445)
TROPONIN, HIGH SENSITIVITY: 58 NG/L (ref 0–14)
VIT B12 SERPL-MCNC: 438 PG/ML (ref 211–911)
WBC # BLD AUTO: 9.3 K/UL (ref 4–11)

## 2024-07-20 PROCEDURE — 82746 ASSAY OF FOLIC ACID SERUM: CPT

## 2024-07-20 PROCEDURE — 84484 ASSAY OF TROPONIN QUANT: CPT

## 2024-07-20 PROCEDURE — 80048 BASIC METABOLIC PNL TOTAL CA: CPT

## 2024-07-20 PROCEDURE — 93005 ELECTROCARDIOGRAM TRACING: CPT | Performed by: INTERNAL MEDICINE

## 2024-07-20 PROCEDURE — 6360000002 HC RX W HCPCS: Performed by: INTERNAL MEDICINE

## 2024-07-20 PROCEDURE — 36415 COLL VENOUS BLD VENIPUNCTURE: CPT

## 2024-07-20 PROCEDURE — 82728 ASSAY OF FERRITIN: CPT

## 2024-07-20 PROCEDURE — 99223 1ST HOSP IP/OBS HIGH 75: CPT | Performed by: PSYCHIATRY & NEUROLOGY

## 2024-07-20 PROCEDURE — 6370000000 HC RX 637 (ALT 250 FOR IP): Performed by: NURSE PRACTITIONER

## 2024-07-20 PROCEDURE — 83735 ASSAY OF MAGNESIUM: CPT

## 2024-07-20 PROCEDURE — 83550 IRON BINDING TEST: CPT

## 2024-07-20 PROCEDURE — 83540 ASSAY OF IRON: CPT

## 2024-07-20 PROCEDURE — 2580000003 HC RX 258: Performed by: NURSE PRACTITIONER

## 2024-07-20 PROCEDURE — 6360000002 HC RX W HCPCS: Performed by: NURSE PRACTITIONER

## 2024-07-20 PROCEDURE — 82607 VITAMIN B-12: CPT

## 2024-07-20 PROCEDURE — 85027 COMPLETE CBC AUTOMATED: CPT

## 2024-07-20 PROCEDURE — 93010 ELECTROCARDIOGRAM REPORT: CPT | Performed by: INTERNAL MEDICINE

## 2024-07-20 RX ORDER — LANOLIN ALCOHOL/MO/W.PET/CERES
200 CREAM (GRAM) TOPICAL DAILY
Qty: 5 TABLET | Refills: 0 | Status: SHIPPED | OUTPATIENT
Start: 2024-07-21 | End: 2024-07-31

## 2024-07-20 RX ORDER — DULOXETIN HYDROCHLORIDE 30 MG/1
90 CAPSULE, DELAYED RELEASE ORAL NIGHTLY
Qty: 30 CAPSULE | Refills: 0
Start: 2024-07-20

## 2024-07-20 RX ORDER — FERROUS SULFATE 325(65) MG
325 TABLET ORAL
Qty: 30 TABLET | Refills: 5 | Status: SHIPPED | OUTPATIENT
Start: 2024-07-20

## 2024-07-20 RX ORDER — MAGNESIUM SULFATE IN WATER 40 MG/ML
2000 INJECTION, SOLUTION INTRAVENOUS ONCE
Status: COMPLETED | OUTPATIENT
Start: 2024-07-20 | End: 2024-07-20

## 2024-07-20 RX ORDER — INSULIN GLARGINE 100 [IU]/ML
33 INJECTION, SOLUTION SUBCUTANEOUS
Qty: 10 ML | Refills: 0
Start: 2024-07-20

## 2024-07-20 RX ADMIN — ENOXAPARIN SODIUM 40 MG: 100 INJECTION SUBCUTANEOUS at 09:19

## 2024-07-20 RX ADMIN — ASPIRIN 81 MG: 81 TABLET, COATED ORAL at 09:20

## 2024-07-20 RX ADMIN — DULOXETINE HYDROCHLORIDE 60 MG: 60 CAPSULE, DELAYED RELEASE ORAL at 09:20

## 2024-07-20 RX ADMIN — SODIUM CHLORIDE, PRESERVATIVE FREE 10 ML: 5 INJECTION INTRAVENOUS at 09:30

## 2024-07-20 RX ADMIN — CLOPIDOGREL BISULFATE 75 MG: 75 TABLET ORAL at 09:20

## 2024-07-20 RX ADMIN — CETIRIZINE HYDROCHLORIDE 10 MG: 10 TABLET, FILM COATED ORAL at 09:20

## 2024-07-20 RX ADMIN — SACUBITRIL AND VALSARTAN 1 TABLET: 24; 26 TABLET, FILM COATED ORAL at 09:20

## 2024-07-20 RX ADMIN — CARVEDILOL 12.5 MG: 6.25 TABLET, FILM COATED ORAL at 09:20

## 2024-07-20 RX ADMIN — ACETAMINOPHEN 500 MG: 500 TABLET ORAL at 04:16

## 2024-07-20 RX ADMIN — Medication 1 TABLET: at 09:20

## 2024-07-20 RX ADMIN — MAGNESIUM SULFATE HEPTAHYDRATE 2000 MG: 40 INJECTION, SOLUTION INTRAVENOUS at 09:34

## 2024-07-20 ASSESSMENT — PAIN DESCRIPTION - LOCATION: LOCATION: LEG

## 2024-07-20 ASSESSMENT — PAIN SCALES - GENERAL: PAINLEVEL_OUTOF10: 2

## 2024-07-20 ASSESSMENT — PAIN DESCRIPTION - ORIENTATION: ORIENTATION: RIGHT

## 2024-07-20 NOTE — CONSULTS
In patient Neurology consult        [facility]      Shawn Berg MD      Rissa Becerra  1950    Date of Service: 7/20/2024    Referring Physician: Stormy Phelps MD      Reason for the consult and CC: Acute right side abnormal movement    HPI:   The patient is a 73 y.o.  years old female with past medical history of hypertension, hyperlipidemia, diabetes, CHF, and right MCA ischemic stroke who comes to the hospital with confusion and right arm and leg jerking movement.  Degree severe duration minutes.  Description right arm and leg rhythmic movement, unable to respond, staring off followed by confusion. Family called EMS and they witnessed such movement ansd was described as jerking movements. In the emergency room CT head no acute abnormaily. CTA imaging showed known left PCA occlusion and right MCA stenosis.  LAb workup showed hypomagnesemia,1.2. Stroke team was consulted and MRI was recommended. MRI brain last night showed no acute stroke. TNK was deferred,IV fluids and Mag was replaced. Patient was admitted to the hospital.  Today patient is resting in chair.  She has dense left-sided weakness and left neglect.   Family reports she has no prior history of such movements. She has an appointment with [facility] neurology next month.  Today, patient denies headache, dizziness, vision changes, or chest pain.  Other review of systems unremarkable       Constitutional:   Vitals:    07/20/24 0330 07/20/24 0827 07/20/24 0914 07/20/24 1115   BP: 124/72  (!) 147/68 108/63   Pulse: 92  99 93   Resp: 20  18 18   Temp: 98.1 °F (36.7 °C)  98.5 °F (36.9 °C) 98.7 °F (37.1 °C)   TempSrc: Oral  Axillary Oral   SpO2: 92%  93% 95%   Weight:  72.6 kg (160 lb 0.9 oz)     Height:             I personally reviewed and updated social history, past medical history, medications, allergy, surgical history, and family history as documented in the patient's electronic health records.       ROS: 10-14 ROS reviewed with the

## 2024-07-20 NOTE — CARE COORDINATION
Case Management -  Discharge Note      Patient Name: Rissa Becerra                   YOB: 1950            Readmission Risk (Low < 19, Mod (19-27), High > 27): Readmission Risk Score: 16.6    Current PCP: Stephanie Oneill MD    ([facility]) Important Message from Medicare:    Date: 07/18/2024    PT AM-PAC: 7 /24  OT AM-PAC: 12 /24    Patient/patient representative has been educated on the benefits of HHC as well as the possible risks of declining recommended services. Patient/patient representative has acknowledged the information provided and decided on the following discharge plan. Patient/ patient representative has been provided freedom of choice regarding service provider, supported by basic dialogue that supports the patient's individualized plan of care/goals.    Home Care Information:   Is patient resuming current home health care services: Yes    Home Care Agency:   [facility]  [address] [place] [address], [address]  [place], [place] [address]  Phone: [phone]  Fax:  [phone]             Services: Skilled Nursing, PT & OT  Home Health Order Obtained: Yes    Home health agency notified of discharge.  - Una in intake     Financial    Payor: [employer] MEDICARE / Plan: [employer] GOLD PLUS HMO / Product Type: *No Product type* /     Pharmacy:  Potential assistance Purchasing Medications: No  Meds-to-Beds request:        [facility] [id number] - [place], [place] - [facility] [address] [phone] - F [phone]  [address]  [facility] [address]  Phone: [phone] Fax: [phone]      Notes:    Additional Case Management Notes:   Family will transport patient home.    Electronically signed by LORETA HERNANDEZ RN/BSN/CCM  on 7/20/2024 at 2:20 PM

## 2024-07-20 NOTE — PROGRESS NOTES
Patient discharged home with  via private vehicle. AVS reviewed and questions answered. All belongings obtained. PIV removed.

## 2024-07-20 NOTE — DISCHARGE SUMMARY
[place] - [address] - P [phone] - F [phone]  [address], [facility] [address]      Phone: [phone]   ferrous sulfate 325 (65 Fe) MG tablet  magnesium oxide 400 (240 Mg) MG tablet       Information about where to get these medications is not yet available    Ask your nurse or doctor about these medications  DULoxetine 30 MG extended release capsule  insulin glargine 100 UNIT/ML injection vial        Objective Findings at Discharge:   /63   Pulse 93   Temp 98.7 °F (37.1 °C) (Oral)   Resp 18   Ht 1.575 m (5' 2\")   Wt 72.6 kg (160 lb 0.9 oz)   SpO2 95%   BMI 29.27 kg/m²       Physical Exam:   General: Nontoxic-appearing female lying in bed on room air in no acute distress.  Eyes: EOMI.  ENT: neck supple  Cardiovascular: Regular rate and rhythm.  No peripheral edema.  S1-S2 present.  No chest wall tenderness.  Respiratory: Regular nonlabored respiration.  Even chest rise.  Bilateral lungs clear to auscultation.  No wheezing or crackles noted.  Gastrointestinal: Soft, non tender and nondistended.  Normoactive bowel sounds.  No ascites noted.  Genitourinary: no suprapubic tenderness  Musculoskeletal: No edema  Skin: warm, dry and without jaundice.  Neuro: Alert and oriented x 2.  Left facial weakness left hemiplegia. Left-sided neglect.  No speech impairment noted.   Psych: Mood appropriate.         Labs and Imaging   MRI BRAIN WO CONTRAST MR WITNESS    Result Date: 7/18/2024  EXAMINATION: MRI OF THE BRAIN WITHOUT CONTRAST  7/18/2024 10:51 pm TECHNIQUE: Multiplanar multisequence MRI of the brain was performed without the administration of intravenous contrast. COMPARISON: None. HISTORY: ORDERING SYSTEM PROVIDED HISTORY: concern for acute stroke R sided deficits, TNK assessment TECHNOLOGIST PROVIDED HISTORY: Reason for exam:->concern for acute stroke R sided deficits, TNK assessment Reason for Exam: THIS IS AN EMERGENT MRI WITNESS STROKE Additional signs and symptoms: THIS IS AN EMERGENT  MRIWITNESS STROKE FINDINGS: INTRACRANIAL STRUCTURES/VENTRICLES: There is no acute infarct. No mass effect or midline shift. No evidence of an acute intracranial hemorrhage.  There is volume loss with mild chronic white matter microvascular ischemic change.  A remote infarct is noted in the right parietotemporal lobe.  The sellar/suprasellar regions appear unremarkable.  The normal signal voids within the major intracranial vessels appear maintained. ORBITS: The visualized portion of the orbits demonstrate no acute abnormality. SINUSES: The visualized paranasal sinuses and mastoid air cells demonstrate no acute abnormality. BONES/SOFT TISSUES: The bone marrow signal intensity appears normal. The soft tissues demonstrate no acute abnormality.     No acute infarct.     CT HEAD WO CONTRAST    Result Date: 7/18/2024  EXAMINATION: CT OF THE HEAD WITHOUT CONTRAST; CTA OF THE HEAD AND NECK WITH CONTRAST 7/18/2024 7:25 pm; 7/18/2024 7:26 pm: TECHNIQUE: CT of the head was performed without the administration of intravenous contrast. Automated exposure control, iterative reconstruction, and/or weight based adjustment of the mA/kV was utilized to reduce the radiation dose to as low as reasonably achievable.; CTA of the head and neck was performed with the administration of intravenous contrast. Multiplanar reformatted images are provided for review.  MIP images are provided for review. Stenosis of the internal carotid arteries measured using NASCET criteria. Automated exposure control, iterative reconstruction, and/or weight based adjustment of the mA/kV was utilized to reduce the radiation dose to as low as reasonably achievable. Noncontrast CT of the head with reconstructed 2-D images are also provided for review. COMPARISON: CTA head and neck 02/12/2024 HISTORY: ORDERING SYSTEM PROVIDED HISTORY: Stroke TECHNOLOGIST PROVIDED HISTORY: Has a \"code stroke\" or \"stroke alert\" been called?->Yes Reason for exam:->Stroke Decision    K 3.7 3.8 3.9   CL 94* 98* 98*   CO2 24 24 26   BUN 16 14 13   CREATININE 0.5* <0.5* <0.5*   GLUCOSE 83 96 128*     Hepatic:   Recent Labs     07/18/24 2040   AST 13*   ALT 7*   BILITOT 0.4   ALKPHOS 86     Lipids:   Lab Results   Component Value Date/Time    CHOL 120 02/13/2024 05:31 AM    HDL 36 02/13/2024 05:31 AM    TRIG 160 02/13/2024 05:31 AM     Hemoglobin A1C:   Lab Results   Component Value Date/Time    LABA1C 6.1 02/17/2024 06:26 AM     TSH: No results found for: \"TSH\"  Troponin: No results found for: \"TROPONINT\"  Lactic Acid: No results for input(s): \"LACTA\" in the last 72 hours.  BNP: No results for input(s): \"PROBNP\" in the last 72 hours.  UA:  Lab Results   Component Value Date/Time    NITRU POSITIVE 03/04/2024 06:00 AM    COLORU Yellow 03/04/2024 06:00 AM    PHUR 5.5 03/04/2024 06:00 AM    WBCUA 2832 03/04/2024 06:00 AM    RBCUA 7 03/04/2024 06:00 AM    BACTERIA 4+ 03/04/2024 06:00 AM    CLARITYU TURBID 03/04/2024 06:00 AM    LEUKOCYTESUR LARGE 03/04/2024 06:00 AM    UROBILINOGEN 1.0 03/04/2024 06:00 AM    BILIRUBINUR Negative 03/04/2024 06:00 AM    BLOODU MODERATE 03/04/2024 06:00 AM    GLUCOSEU Negative 03/04/2024 06:00 AM    KETUA Negative 03/04/2024 06:00 AM     Urine Cultures:   Lab Results   Component Value Date/Time    LABURIN >100,000 CFU/ml 03/04/2024 06:00 AM     Blood Cultures: No results found for: \"BC\"  No results found for: \"BLOODCULT2\"  Organism:   Lab Results   Component Value Date/Time    ORG Escherichia coli 03/04/2024 06:00 AM       Time Spent Discharging patient 35 minutes    Electronically signed by Stormy Phelps MD on 7/20/2024 at 1:11 PM